# Patient Record
Sex: FEMALE | Race: WHITE | Employment: OTHER | URBAN - METROPOLITAN AREA
[De-identification: names, ages, dates, MRNs, and addresses within clinical notes are randomized per-mention and may not be internally consistent; named-entity substitution may affect disease eponyms.]

---

## 2017-01-17 PROBLEM — F51.01 PRIMARY INSOMNIA: Status: ACTIVE | Noted: 2017-01-17

## 2017-07-21 PROBLEM — I77.9 BILATERAL CAROTID ARTERY DISEASE (HCC): Status: ACTIVE | Noted: 2017-07-21

## 2017-12-11 PROBLEM — G25.0 ESSENTIAL TREMOR: Status: ACTIVE | Noted: 2017-12-11

## 2018-01-08 ENCOUNTER — HOSPITAL ENCOUNTER (OUTPATIENT)
Dept: CT IMAGING | Age: 78
Discharge: HOME | End: 2018-01-08
Attending: INTERNAL MEDICINE
Payer: MEDICARE

## 2018-01-08 VITALS — BODY MASS INDEX: 26.22 KG/M2 | HEIGHT: 63 IN | WEIGHT: 148 LBS

## 2018-01-08 DIAGNOSIS — I77.9 BILATERAL CAROTID ARTERY DISEASE (HCC): ICD-10-CM

## 2018-01-08 LAB — CREAT BLD-MCNC: 0.7 MG/DL (ref 0.8–1.5)

## 2018-01-08 PROCEDURE — 74011000258 HC RX REV CODE- 258: Performed by: INTERNAL MEDICINE

## 2018-01-08 PROCEDURE — 74011636320 HC RX REV CODE- 636/320: Performed by: INTERNAL MEDICINE

## 2018-01-08 PROCEDURE — 70498 CT ANGIOGRAPHY NECK: CPT

## 2018-01-08 PROCEDURE — 82565 ASSAY OF CREATININE: CPT

## 2018-01-08 RX ORDER — SODIUM CHLORIDE 0.9 % (FLUSH) 0.9 %
10 SYRINGE (ML) INJECTION
Status: COMPLETED | OUTPATIENT
Start: 2018-01-08 | End: 2018-01-08

## 2018-01-08 RX ADMIN — SODIUM CHLORIDE 100 ML: 900 INJECTION, SOLUTION INTRAVENOUS at 09:55

## 2018-01-08 RX ADMIN — Medication 10 ML: at 09:55

## 2018-01-08 RX ADMIN — IOPAMIDOL 80 ML: 755 INJECTION, SOLUTION INTRAVENOUS at 09:55

## 2018-01-08 NOTE — PROGRESS NOTES
I called and informed patient of CTA neck results and Dr. Linda Cabral response. Patient verbalized understanding and appreciation.

## 2018-01-08 NOTE — PROGRESS NOTES
Please let her know that the CTA neck showed 50% buildup, nothing critical.  Good news. No surgery needed. We will review more at follow up.   Thanks

## 2018-06-07 PROBLEM — G25.0 ESSENTIAL TREMOR: Chronic | Status: ACTIVE | Noted: 2017-12-11

## 2018-06-07 PROBLEM — E78.00 PURE HYPERCHOLESTEROLEMIA: Status: ACTIVE | Noted: 2018-06-07

## 2018-12-11 PROBLEM — K21.9 GASTROESOPHAGEAL REFLUX DISEASE WITHOUT ESOPHAGITIS: Status: ACTIVE | Noted: 2018-12-11

## 2018-12-11 PROBLEM — I48.21 PERMANENT ATRIAL FIBRILLATION (HCC): Status: ACTIVE | Noted: 2018-12-11

## 2019-06-18 PROBLEM — Z79.01 LONG TERM CURRENT USE OF ANTICOAGULANT: Status: ACTIVE | Noted: 2019-06-18

## 2019-06-18 PROBLEM — F41.1 GENERALIZED ANXIETY DISORDER: Status: ACTIVE | Noted: 2019-06-18

## 2019-06-18 PROBLEM — I65.23 BILATERAL CAROTID ARTERY STENOSIS: Status: ACTIVE | Noted: 2019-06-18

## 2019-06-18 PROBLEM — D50.9 IRON DEFICIENCY ANEMIA: Status: ACTIVE | Noted: 2019-06-18

## 2019-12-09 ENCOUNTER — HOSPITAL ENCOUNTER (OUTPATIENT)
Dept: GENERAL RADIOLOGY | Age: 79
Discharge: HOME | End: 2019-12-09
Payer: MEDICARE

## 2019-12-09 DIAGNOSIS — R05.9 COUGH: ICD-10-CM

## 2019-12-09 PROCEDURE — 71046 X-RAY EXAM CHEST 2 VIEWS: CPT

## 2019-12-30 ENCOUNTER — HOSPITAL ENCOUNTER (OUTPATIENT)
Dept: LAB | Age: 79
Discharge: HOME | End: 2019-12-30
Payer: MEDICARE

## 2019-12-30 DIAGNOSIS — D50.9 IRON DEFICIENCY ANEMIA, UNSPECIFIED IRON DEFICIENCY ANEMIA TYPE: ICD-10-CM

## 2019-12-30 DIAGNOSIS — R68.89 OTHER GENERAL SYMPTOMS AND SIGNS: ICD-10-CM

## 2019-12-30 LAB
ALBUMIN SERPL-MCNC: 3.6 G/DL (ref 3.2–4.6)
ALBUMIN/GLOB SERPL: 0.9 {RATIO} (ref 1.2–3.5)
ALP SERPL-CCNC: 119 U/L (ref 50–136)
ALT SERPL-CCNC: 40 U/L (ref 12–65)
ANION GAP SERPL CALC-SCNC: 6 MMOL/L (ref 7–16)
AST SERPL-CCNC: 21 U/L (ref 15–37)
BASOPHILS # BLD: 0 K/UL (ref 0–0.2)
BASOPHILS NFR BLD: 0 % (ref 0–2)
BILIRUB SERPL-MCNC: 0.5 MG/DL (ref 0.2–1.1)
BUN SERPL-MCNC: 12 MG/DL (ref 8–23)
CALCIUM SERPL-MCNC: 8.9 MG/DL (ref 8.3–10.4)
CHLORIDE SERPL-SCNC: 104 MMOL/L (ref 98–107)
CO2 SERPL-SCNC: 26 MMOL/L (ref 21–32)
CREAT SERPL-MCNC: 0.8 MG/DL (ref 0.6–1)
DIFFERENTIAL METHOD BLD: ABNORMAL
EOSINOPHIL # BLD: 0.2 K/UL (ref 0–0.8)
EOSINOPHIL NFR BLD: 3 % (ref 0.5–7.8)
ERYTHROCYTE [DISTWIDTH] IN BLOOD BY AUTOMATED COUNT: 17.4 % (ref 11.9–14.6)
FERRITIN SERPL-MCNC: 10 NG/ML (ref 8–388)
FOLATE SERPL-MCNC: 8.7 NG/ML (ref 3.1–17.5)
GLOBULIN SER CALC-MCNC: 3.8 G/DL (ref 2.3–3.5)
GLUCOSE SERPL-MCNC: 113 MG/DL (ref 65–100)
HCT VFR BLD AUTO: 34.8 % (ref 35.8–46.3)
HGB BLD-MCNC: 10.1 G/DL (ref 11.7–15.4)
HGB RETIC QN AUTO: 24 PG (ref 29–35)
IMM GRANULOCYTES # BLD AUTO: 0 K/UL (ref 0–0.5)
IMM GRANULOCYTES NFR BLD AUTO: 0 % (ref 0–5)
IMM RETICS NFR: 20.9 % (ref 3–15.9)
IRON SATN MFR SERPL: 5 %
IRON SERPL-MCNC: 24 UG/DL (ref 35–150)
LYMPHOCYTES # BLD: 2.1 K/UL (ref 0.5–4.6)
LYMPHOCYTES NFR BLD: 29 % (ref 13–44)
MCH RBC QN AUTO: 22.1 PG (ref 26.1–32.9)
MCHC RBC AUTO-ENTMCNC: 29 G/DL (ref 31.4–35)
MCV RBC AUTO: 76.1 FL (ref 79.6–97.8)
MONOCYTES # BLD: 0.6 K/UL (ref 0.1–1.3)
MONOCYTES NFR BLD: 9 % (ref 4–12)
NEUTS SEG # BLD: 4.3 K/UL (ref 1.7–8.2)
NEUTS SEG NFR BLD: 59 % (ref 43–78)
NRBC # BLD: 0 K/UL (ref 0–0.2)
PLATELET # BLD AUTO: 351 K/UL (ref 150–450)
PMV BLD AUTO: 9.7 FL (ref 9.4–12.3)
POTASSIUM SERPL-SCNC: 4 MMOL/L (ref 3.5–5.1)
PROT SERPL-MCNC: 7.4 G/DL (ref 6.3–8.2)
RBC # BLD AUTO: 4.57 M/UL (ref 4.05–5.25)
RETICS # AUTO: 0.06 M/UL (ref 0.03–0.1)
RETICS/RBC NFR AUTO: 1.3 % (ref 0.3–2)
SODIUM SERPL-SCNC: 136 MMOL/L (ref 136–145)
TIBC SERPL-MCNC: 488 UG/DL (ref 250–450)
VIT B12 SERPL-MCNC: 459 PG/ML (ref 193–986)
WBC # BLD AUTO: 7.3 K/UL (ref 4.3–11.1)

## 2019-12-30 PROCEDURE — 85046 RETICYTE/HGB CONCENTRATE: CPT

## 2019-12-30 PROCEDURE — 36415 COLL VENOUS BLD VENIPUNCTURE: CPT

## 2019-12-30 PROCEDURE — 80053 COMPREHEN METABOLIC PANEL: CPT

## 2019-12-30 PROCEDURE — 82746 ASSAY OF FOLIC ACID SERUM: CPT

## 2019-12-30 PROCEDURE — 83540 ASSAY OF IRON: CPT

## 2019-12-30 PROCEDURE — 82607 VITAMIN B-12: CPT

## 2019-12-30 PROCEDURE — 85025 COMPLETE CBC W/AUTO DIFF WBC: CPT

## 2019-12-30 PROCEDURE — 82728 ASSAY OF FERRITIN: CPT

## 2020-01-07 ENCOUNTER — HOSPITAL ENCOUNTER (OUTPATIENT)
Dept: INFUSION THERAPY | Age: 80
Discharge: HOME | End: 2020-01-07
Payer: MEDICARE

## 2020-01-07 VITALS
TEMPERATURE: 97.6 F | HEART RATE: 57 BPM | SYSTOLIC BLOOD PRESSURE: 144 MMHG | OXYGEN SATURATION: 96 % | DIASTOLIC BLOOD PRESSURE: 76 MMHG | RESPIRATION RATE: 18 BRPM

## 2020-01-07 DIAGNOSIS — D50.9 IRON DEFICIENCY ANEMIA, UNSPECIFIED IRON DEFICIENCY ANEMIA TYPE: Primary | ICD-10-CM

## 2020-01-07 PROCEDURE — 96361 HYDRATE IV INFUSION ADD-ON: CPT

## 2020-01-07 PROCEDURE — 96374 THER/PROPH/DIAG INJ IV PUSH: CPT

## 2020-01-07 PROCEDURE — 74011000258 HC RX REV CODE- 258: Performed by: INTERNAL MEDICINE

## 2020-01-07 PROCEDURE — 74011250636 HC RX REV CODE- 250/636: Performed by: INTERNAL MEDICINE

## 2020-01-07 RX ORDER — SODIUM CHLORIDE 0.9 % (FLUSH) 0.9 %
10 SYRINGE (ML) INJECTION AS NEEDED
Status: DISCONTINUED | OUTPATIENT
Start: 2020-01-07 | End: 2020-01-08 | Stop reason: HOSPADM

## 2020-01-07 RX ADMIN — Medication 10 ML: at 14:24

## 2020-01-07 RX ADMIN — FERUMOXYTOL 510 MG: 510 INJECTION INTRAVENOUS at 14:24

## 2020-01-07 RX ADMIN — SODIUM CHLORIDE 500 ML: 900 INJECTION, SOLUTION INTRAVENOUS at 14:39

## 2020-01-07 NOTE — PROGRESS NOTES
Arrived to the [facility]. Patience completed. Patient tolerated well. Any issues or concerns during appointment: none. Patient aware of next infusion appointment on 1/14 (date) at 2:30 PM (time). Discharged ambulatory.

## 2020-01-14 ENCOUNTER — HOSPITAL ENCOUNTER (OUTPATIENT)
Dept: INFUSION THERAPY | Age: 80
Discharge: HOME | End: 2020-01-14
Payer: MEDICARE

## 2020-01-14 VITALS
TEMPERATURE: 97.8 F | DIASTOLIC BLOOD PRESSURE: 70 MMHG | RESPIRATION RATE: 18 BRPM | HEART RATE: 65 BPM | OXYGEN SATURATION: 97 % | SYSTOLIC BLOOD PRESSURE: 152 MMHG

## 2020-01-14 DIAGNOSIS — D50.9 IRON DEFICIENCY ANEMIA, UNSPECIFIED IRON DEFICIENCY ANEMIA TYPE: Primary | ICD-10-CM

## 2020-01-14 PROCEDURE — 74011250636 HC RX REV CODE- 250/636: Performed by: INTERNAL MEDICINE

## 2020-01-14 PROCEDURE — 96374 THER/PROPH/DIAG INJ IV PUSH: CPT

## 2020-01-14 PROCEDURE — 74011000258 HC RX REV CODE- 258: Performed by: INTERNAL MEDICINE

## 2020-01-14 RX ORDER — SODIUM CHLORIDE 0.9 % (FLUSH) 0.9 %
10 SYRINGE (ML) INJECTION AS NEEDED
Status: DISCONTINUED | OUTPATIENT
Start: 2020-01-14 | End: 2020-01-15 | Stop reason: HOSPADM

## 2020-01-14 RX ADMIN — SODIUM CHLORIDE 500 ML: 900 INJECTION, SOLUTION INTRAVENOUS at 14:30

## 2020-01-14 RX ADMIN — FERUMOXYTOL 510 MG: 510 INJECTION INTRAVENOUS at 14:50

## 2020-01-14 RX ADMIN — Medication 10 ML: at 15:35

## 2020-01-14 RX ADMIN — Medication 10 ML: at 14:30

## 2020-04-30 ENCOUNTER — HOSPITAL ENCOUNTER (OUTPATIENT)
Dept: LAB | Age: 80
Discharge: HOME | End: 2020-04-30
Payer: MEDICARE

## 2020-04-30 DIAGNOSIS — D50.9 IRON DEFICIENCY ANEMIA, UNSPECIFIED IRON DEFICIENCY ANEMIA TYPE: ICD-10-CM

## 2020-04-30 LAB
ALBUMIN SERPL-MCNC: 3.6 G/DL (ref 3.2–4.6)
ALBUMIN/GLOB SERPL: 1 {RATIO} (ref 1.2–3.5)
ALP SERPL-CCNC: 95 U/L (ref 50–136)
ALT SERPL-CCNC: 24 U/L (ref 12–65)
ANION GAP SERPL CALC-SCNC: 5 MMOL/L (ref 7–16)
AST SERPL-CCNC: 14 U/L (ref 15–37)
BASOPHILS # BLD: 0 K/UL (ref 0–0.2)
BASOPHILS NFR BLD: 0 % (ref 0–2)
BILIRUB SERPL-MCNC: 0.4 MG/DL (ref 0.2–1.1)
BUN SERPL-MCNC: 11 MG/DL (ref 8–23)
CALCIUM SERPL-MCNC: 8.9 MG/DL (ref 8.3–10.4)
CHLORIDE SERPL-SCNC: 103 MMOL/L (ref 98–107)
CO2 SERPL-SCNC: 27 MMOL/L (ref 21–32)
CREAT SERPL-MCNC: 0.68 MG/DL (ref 0.6–1)
DIFFERENTIAL METHOD BLD: ABNORMAL
EOSINOPHIL # BLD: 0.2 K/UL (ref 0–0.8)
EOSINOPHIL NFR BLD: 2 % (ref 0.5–7.8)
ERYTHROCYTE [DISTWIDTH] IN BLOOD BY AUTOMATED COUNT: 15.2 % (ref 11.9–14.6)
FERRITIN SERPL-MCNC: 139 NG/ML (ref 8–388)
GLOBULIN SER CALC-MCNC: 3.7 G/DL (ref 2.3–3.5)
GLUCOSE SERPL-MCNC: 104 MG/DL (ref 65–100)
HCT VFR BLD AUTO: 42 % (ref 35.8–46.3)
HGB BLD-MCNC: 13.7 G/DL (ref 11.7–15.4)
IMM GRANULOCYTES # BLD AUTO: 0 K/UL (ref 0–0.5)
IMM GRANULOCYTES NFR BLD AUTO: 0 % (ref 0–5)
IRON SATN MFR SERPL: 35 %
IRON SERPL-MCNC: 106 UG/DL (ref 35–150)
LYMPHOCYTES # BLD: 2.5 K/UL (ref 0.5–4.6)
LYMPHOCYTES NFR BLD: 31 % (ref 13–44)
MCH RBC QN AUTO: 31.1 PG (ref 26.1–32.9)
MCHC RBC AUTO-ENTMCNC: 32.6 G/DL (ref 31.4–35)
MCV RBC AUTO: 95.5 FL (ref 79.6–97.8)
MONOCYTES # BLD: 0.5 K/UL (ref 0.1–1.3)
MONOCYTES NFR BLD: 7 % (ref 4–12)
NEUTS SEG # BLD: 4.9 K/UL (ref 1.7–8.2)
NEUTS SEG NFR BLD: 60 % (ref 43–78)
NRBC # BLD: 0 K/UL (ref 0–0.2)
PLATELET # BLD AUTO: 193 K/UL (ref 150–450)
PMV BLD AUTO: 10.1 FL (ref 9.4–12.3)
POTASSIUM SERPL-SCNC: 4.2 MMOL/L (ref 3.5–5.1)
PROT SERPL-MCNC: 7.3 G/DL (ref 6.3–8.2)
RBC # BLD AUTO: 4.4 M/UL (ref 4.05–5.25)
SODIUM SERPL-SCNC: 135 MMOL/L (ref 136–145)
TIBC SERPL-MCNC: 304 UG/DL (ref 250–450)
WBC # BLD AUTO: 8 K/UL (ref 4.3–11.1)

## 2020-04-30 PROCEDURE — 85025 COMPLETE CBC W/AUTO DIFF WBC: CPT

## 2020-04-30 PROCEDURE — 83540 ASSAY OF IRON: CPT

## 2020-04-30 PROCEDURE — 36415 COLL VENOUS BLD VENIPUNCTURE: CPT

## 2020-04-30 PROCEDURE — 80053 COMPREHEN METABOLIC PANEL: CPT

## 2020-04-30 PROCEDURE — 82728 ASSAY OF FERRITIN: CPT

## 2020-09-21 ENCOUNTER — HOSPITAL ENCOUNTER (OUTPATIENT)
Dept: LAB | Age: 80
Discharge: HOME | End: 2020-09-21
Payer: MEDICARE

## 2020-09-21 DIAGNOSIS — I27.20 PULMONARY HYPERTENSION (HCC): ICD-10-CM

## 2020-09-21 LAB
ANION GAP SERPL CALC-SCNC: 3 MMOL/L (ref 7–16)
BUN SERPL-MCNC: 8 MG/DL (ref 8–23)
CALCIUM SERPL-MCNC: 8.9 MG/DL (ref 8.3–10.4)
CHLORIDE SERPL-SCNC: 103 MMOL/L (ref 98–107)
CO2 SERPL-SCNC: 27 MMOL/L (ref 21–32)
CREAT SERPL-MCNC: 0.75 MG/DL (ref 0.6–1)
GLUCOSE SERPL-MCNC: 84 MG/DL (ref 65–100)
POTASSIUM SERPL-SCNC: 4.5 MMOL/L (ref 3.5–5.1)
SODIUM SERPL-SCNC: 133 MMOL/L (ref 136–145)

## 2020-09-21 PROCEDURE — 80048 BASIC METABOLIC PNL TOTAL CA: CPT

## 2020-09-21 PROCEDURE — 36415 COLL VENOUS BLD VENIPUNCTURE: CPT

## 2020-09-21 NOTE — PROGRESS NOTES
Please call her, K is better now, good news. Remain on aldactone and other meds. No changes. Call me for issues, see me as planned.   Thanks

## 2022-03-18 PROBLEM — F51.01 PRIMARY INSOMNIA: Status: ACTIVE | Noted: 2017-01-17

## 2022-03-18 PROBLEM — D50.9 IRON DEFICIENCY ANEMIA: Status: ACTIVE | Noted: 2019-06-18

## 2022-03-18 PROBLEM — Z79.01 LONG TERM CURRENT USE OF ANTICOAGULANT: Status: ACTIVE | Noted: 2019-06-18

## 2022-03-19 PROBLEM — I77.9 BILATERAL CAROTID ARTERY DISEASE (HCC): Status: ACTIVE | Noted: 2017-07-21

## 2022-03-19 PROBLEM — E78.00 PURE HYPERCHOLESTEROLEMIA: Status: ACTIVE | Noted: 2018-06-07

## 2022-03-19 PROBLEM — I48.21 PERMANENT ATRIAL FIBRILLATION (HCC): Status: ACTIVE | Noted: 2018-12-11

## 2022-03-19 PROBLEM — F41.1 GENERALIZED ANXIETY DISORDER: Status: ACTIVE | Noted: 2019-06-18

## 2022-03-19 PROBLEM — K21.9 GASTROESOPHAGEAL REFLUX DISEASE WITHOUT ESOPHAGITIS: Status: ACTIVE | Noted: 2018-12-11

## 2022-03-20 PROBLEM — I65.23 BILATERAL CAROTID ARTERY STENOSIS: Status: ACTIVE | Noted: 2019-06-18

## 2022-03-20 PROBLEM — G25.0 ESSENTIAL TREMOR: Status: ACTIVE | Noted: 2017-12-11

## 2022-06-06 DIAGNOSIS — I48.0 PAROXYSMAL ATRIAL FIBRILLATION (HCC): Primary | ICD-10-CM

## 2022-06-06 DIAGNOSIS — I10 ESSENTIAL HYPERTENSION: ICD-10-CM

## 2022-06-06 LAB
ALBUMIN SERPL-MCNC: 4 G/DL (ref 3.2–4.6)
ALBUMIN/GLOB SERPL: 1.3 {RATIO} (ref 1.2–3.5)
ALP SERPL-CCNC: 103 U/L (ref 50–136)
ALT SERPL-CCNC: 19 U/L (ref 12–65)
ANION GAP SERPL CALC-SCNC: 6 MMOL/L (ref 7–16)
AST SERPL-CCNC: 14 U/L (ref 15–37)
BASOPHILS # BLD: 0 K/UL (ref 0–0.2)
BASOPHILS NFR BLD: 0 % (ref 0–2)
BILIRUB SERPL-MCNC: 0.6 MG/DL (ref 0.2–1.1)
BUN SERPL-MCNC: 13 MG/DL (ref 8–23)
CALCIUM SERPL-MCNC: 9.8 MG/DL (ref 8.3–10.4)
CHLORIDE SERPL-SCNC: 103 MMOL/L (ref 98–107)
CO2 SERPL-SCNC: 26 MMOL/L (ref 21–32)
CREAT SERPL-MCNC: 0.8 MG/DL (ref 0.6–1)
DIFFERENTIAL METHOD BLD: ABNORMAL
EOSINOPHIL # BLD: 0.2 K/UL (ref 0–0.8)
EOSINOPHIL NFR BLD: 3 % (ref 0.5–7.8)
ERYTHROCYTE [DISTWIDTH] IN BLOOD BY AUTOMATED COUNT: 13.4 % (ref 11.9–14.6)
GLOBULIN SER CALC-MCNC: 3.2 G/DL (ref 2.3–3.5)
GLUCOSE SERPL-MCNC: 92 MG/DL (ref 65–100)
HCT VFR BLD AUTO: 40.6 % (ref 35.8–46.3)
HGB BLD-MCNC: 13 G/DL (ref 11.7–15.4)
IMM GRANULOCYTES # BLD AUTO: 0 K/UL (ref 0–0.5)
IMM GRANULOCYTES NFR BLD AUTO: 0 % (ref 0–5)
LYMPHOCYTES # BLD: 3.1 K/UL (ref 0.5–4.6)
LYMPHOCYTES NFR BLD: 45 % (ref 13–44)
MCH RBC QN AUTO: 30.4 PG (ref 26.1–32.9)
MCHC RBC AUTO-ENTMCNC: 32 G/DL (ref 31.4–35)
MCV RBC AUTO: 95.1 FL (ref 79.6–97.8)
MONOCYTES # BLD: 0.5 K/UL (ref 0.1–1.3)
MONOCYTES NFR BLD: 8 % (ref 4–12)
NEUTS SEG # BLD: 3 K/UL (ref 1.7–8.2)
NEUTS SEG NFR BLD: 44 % (ref 43–78)
NRBC # BLD: 0 K/UL (ref 0–0.2)
PLATELET # BLD AUTO: 227 K/UL (ref 150–450)
PMV BLD AUTO: 10.4 FL (ref 9.4–12.3)
POTASSIUM SERPL-SCNC: 4.7 MMOL/L (ref 3.5–5.1)
PROT SERPL-MCNC: 7.2 G/DL (ref 6.3–8.2)
RBC # BLD AUTO: 4.27 M/UL (ref 4.05–5.2)
SODIUM SERPL-SCNC: 135 MMOL/L (ref 136–145)
WBC # BLD AUTO: 6.9 K/UL (ref 4.3–11.1)

## 2022-06-09 ENCOUNTER — OFFICE VISIT (OUTPATIENT)
Dept: FAMILY MEDICINE CLINIC | Facility: CLINIC | Age: 82
End: 2022-06-09
Payer: MEDICARE

## 2022-06-09 VITALS
WEIGHT: 157.25 LBS | BODY MASS INDEX: 29.69 KG/M2 | DIASTOLIC BLOOD PRESSURE: 100 MMHG | OXYGEN SATURATION: 97 % | SYSTOLIC BLOOD PRESSURE: 146 MMHG | TEMPERATURE: 97.3 F | HEIGHT: 61 IN | HEART RATE: 59 BPM

## 2022-06-09 DIAGNOSIS — I10 WHITE COAT SYNDROME WITH DIAGNOSIS OF HYPERTENSION: ICD-10-CM

## 2022-06-09 DIAGNOSIS — I48.0 PAROXYSMAL ATRIAL FIBRILLATION (HCC): ICD-10-CM

## 2022-06-09 DIAGNOSIS — Z86.73 HISTORY OF CVA (CEREBROVASCULAR ACCIDENT): ICD-10-CM

## 2022-06-09 DIAGNOSIS — E78.2 MIXED HYPERLIPIDEMIA: ICD-10-CM

## 2022-06-09 DIAGNOSIS — F51.04 CHRONIC INSOMNIA: Primary | ICD-10-CM

## 2022-06-09 DIAGNOSIS — I10 ESSENTIAL HYPERTENSION: ICD-10-CM

## 2022-06-09 DIAGNOSIS — F41.1 GENERALIZED ANXIETY DISORDER: ICD-10-CM

## 2022-06-09 PROCEDURE — 1123F ACP DISCUSS/DSCN MKR DOCD: CPT | Performed by: PHYSICIAN ASSISTANT

## 2022-06-09 PROCEDURE — G8427 DOCREV CUR MEDS BY ELIG CLIN: HCPCS | Performed by: PHYSICIAN ASSISTANT

## 2022-06-09 PROCEDURE — G8400 PT W/DXA NO RESULTS DOC: HCPCS | Performed by: PHYSICIAN ASSISTANT

## 2022-06-09 PROCEDURE — 1090F PRES/ABSN URINE INCON ASSESS: CPT | Performed by: PHYSICIAN ASSISTANT

## 2022-06-09 PROCEDURE — G8417 CALC BMI ABV UP PARAM F/U: HCPCS | Performed by: PHYSICIAN ASSISTANT

## 2022-06-09 PROCEDURE — 99214 OFFICE O/P EST MOD 30 MIN: CPT | Performed by: PHYSICIAN ASSISTANT

## 2022-06-09 PROCEDURE — 1036F TOBACCO NON-USER: CPT | Performed by: PHYSICIAN ASSISTANT

## 2022-06-09 RX ORDER — LORAZEPAM 2 MG/1
2 TABLET ORAL NIGHTLY PRN
Qty: 30 TABLET | Refills: 5 | Status: SHIPPED | OUTPATIENT
Start: 2022-06-09 | End: 2022-07-09

## 2022-06-09 NOTE — PROGRESS NOTES
Chief Complaint   Patient presents with    Follow-up     6 month, discuss test results, feeling fine    Medication Refill       HISTORY OF PRESENT ILLNESS:  Yves Sarkar is a very pleasant [de-identified] y.o. female seen for a follow up visit for several chronic medical problems, includin. Hypertension- uncontrolled. BP today in the office was 146/100. She does monitor BP at home and BP normally around 130's/60's. She denies CP, ACUNA/SOB, palpitations, lower extremity edema, dizziness, syncopal episodes, tobacco use, regular ETOH use. She does have a history of CVA. She also has a. Fib and sees cardiology. Compliant with aspirin and statin. 2. Hyperlipidemia- lipids to goal  Lab Results   Component Value Date    CHOL 165 2021    CHOL 149 2021    CHOL 158 2020     Lab Results   Component Value Date    TRIG 133 2021    TRIG 94 2021    TRIG 132 2020     Lab Results   Component Value Date    HDL 51 2021    HDL 53 2021    HDL 54 2020     Lab Results   Component Value Date    LDLCALC 91 2021    LDLCALC 79 2021    LDLCALC 81 2020     Lab Results   Component Value Date    LABVLDL 23 2020    LABVLDL 16 2019    LABVLDL 18 2019    VLDL 23 2021    VLDL 17 2021    VLDL 23 2020       3. Anxiety- stable with citalopram. She she denies depressed mood, anhedonia, fatigue change in appetite/weight, change in sleep habits, difficulty concentrating, suicidal thoughts, or panic attacks. She takes lorazepam nightly for insomnia. She denies AM drowsiness, falls. PAST MEDICAL HISTORY:  Current Outpatient Medications   Medication Sig Dispense Refill    LORazepam (ATIVAN) 2 MG tablet Take 1 tablet by mouth nightly as needed (insomnia) for up to 30 days.  TAKE 1 TABLET BY MOUTH AT NIGHT (MAX DAILY AMOUNT: 2 MG.) 30 tablet 5    amLODIPine (NORVASC) 5 MG tablet Take 5 mg by mouth daily      ascorbic acid (VITAMIN C) 250 MG tablet Take by mouth      aspirin 81 MG EC tablet Take by mouth daily      atorvastatin (LIPITOR) 40 MG tablet Take 40 mg by mouth daily      citalopram (CELEXA) 40 MG tablet Take 40 mg by mouth daily      colestipol (COLESTID) 5 g GRAN granules Take 5 g by mouth 2 times daily      lisinopril (PRINIVIL;ZESTRIL) 20 MG tablet Take 20 mg by mouth 2 times daily      omeprazole (PRILOSEC) 20 MG delayed release capsule Take 20 mg by mouth daily      propranolol (INDERAL) 40 MG tablet Take 40 mg by mouth 2 times daily      rivaroxaban (XARELTO) 20 MG TABS tablet Take 20 mg by mouth Daily with supper      spironolactone (ALDACTONE) 25 MG tablet Take 25 mg by mouth daily       No current facility-administered medications for this visit. No Known Allergies  Patient Active Problem List   Diagnosis    Paroxysmal atrial fibrillation (HCC)    Primary insomnia    Iron deficiency anemia    Long term current use of anticoagulant    Generalized anxiety disorder    Gastroesophageal reflux disease without esophagitis    Pulmonary hypertension (HCC)    SSS (sick sinus syndrome) (HCC)    Permanent atrial fibrillation (HCC)    Mitral valve regurgitation    History of CVA (cerebrovascular accident)    Bilateral carotid artery disease ([address]    Pure hypercholesterolemia    Bilateral carotid artery stenosis    Essential tremor     Social History     Tobacco Use    Smoking status: Never Smoker    Smokeless tobacco: Never Used   Substance Use Topics    Alcohol use: No     Alcohol/week: 0.0 standard drinks     Family History   Problem Relation Age of Onset    Stroke Mother     Heart Disease Father      Past Surgical History:   Procedure Laterality Date    COLONOSCOPY  2011    GYN      sharonda--years ago    ORTHOPEDIC SURGERY  2013    left total knee       Review of Systems   Constitutional: Negative for activity change, appetite change, chills, fatigue and unexpected weight change.    Respiratory: Negative for cough and shortness of breath. Cardiovascular: Negative for chest pain, palpitations and leg swelling. Gastrointestinal: Positive for diarrhea. Negative for abdominal pain, anal bleeding, blood in stool, constipation, nausea and vomiting. Genitourinary: Negative. Musculoskeletal: Negative for gait problem. Skin: Negative. Neurological: Negative for dizziness. Psychiatric/Behavioral: Positive for sleep disturbance. Negative for agitation and dysphoric mood. The patient is nervous/anxious. VITAL SIGNS:  BP (!) 146/100 (Site: Left Upper Arm, Position: Sitting)   Pulse 59   Temp 97.3 °F (36.3 °C) (Temporal)   Ht 5' 1\" (1.549 m)   Wt 157 lb 4 oz (71.3 kg)   SpO2 97%   BMI 29.71 kg/m²      Physical Exam  Vitals reviewed. Constitutional:       Appearance: Normal appearance. She is normal weight. HENT:      Head: Normocephalic and atraumatic. Right Ear: External ear normal.      Left Ear: External ear normal.   Eyes:      Conjunctiva/sclera: Conjunctivae normal.   Cardiovascular:      Rate and Rhythm: Normal rate and regular rhythm. Heart sounds: Normal heart sounds. No murmur heard. Pulmonary:      Effort: Pulmonary effort is normal.      Breath sounds: Normal breath sounds. Skin:     General: Skin is warm and dry. Neurological:      Mental Status: She is alert and oriented to person, place, and time. Psychiatric:         Mood and Affect: Mood normal.         Behavior: Behavior normal.          ASSESSMENT/PLAN:  Elizabeth Lopez was seen today for follow-up and medication refill. Diagnoses and all orders for this visit:    Chronic insomnia  -     LORazepam (ATIVAN) 2 MG tablet; Take 1 tablet by mouth nightly as needed (insomnia) for up to 30 days.  TAKE 1 TABLET BY MOUTH AT NIGHT (MAX DAILY AMOUNT: 2 MG.)    Paroxysmal atrial fibrillation (HCC)    Essential hypertension    Generalized anxiety disorder    History of CVA (cerebrovascular accident)    Mixed hyperlipidemia    White coat syndrome with diagnosis of hypertension     I discussed all lab results. Discussed risks associated with long term benzodiazepine use, including physical and psychological dependence. Avoid taking lorazepam with opioid pain medications and alcohol as this could lead to respiratory depression and death. I recommended that she try taking 1mg nightly for insomnia. Follow up in 6 months.        PDMP Monitoring:    Last PDMP Jonathan Troncoso as Reviewed [facility]):  Review User Review Instant Review Result   LEIDY LAZO 6/9/2022  9:24 AM Reviewed PDMP [1]     Zoey Moyer PA-C

## 2022-06-10 ASSESSMENT — ENCOUNTER SYMPTOMS
CONSTIPATION: 0
SHORTNESS OF BREATH: 0
NAUSEA: 0
ANAL BLEEDING: 0
COUGH: 0
BLOOD IN STOOL: 0
ABDOMINAL PAIN: 0
VOMITING: 0
DIARRHEA: 1

## 2022-06-22 ENCOUNTER — TELEPHONE (OUTPATIENT)
Dept: CARDIOLOGY CLINIC | Age: 82
End: 2022-06-22

## 2022-06-22 NOTE — TELEPHONE ENCOUNTER
Would like to  Xarelto samples at [facility] or [facility] office.

## 2022-08-17 ENCOUNTER — TELEPHONE (OUTPATIENT)
Dept: CARDIOLOGY CLINIC | Age: 82
End: 2022-08-17

## 2022-08-17 NOTE — TELEPHONE ENCOUNTER
Patient would like samples of Xarelto. Patient states she would like to pick samples up at the [place] [id number] office. Please call [phone] when ready.

## 2022-09-29 ENCOUNTER — OFFICE VISIT (OUTPATIENT)
Dept: CARDIOLOGY CLINIC | Age: 82
End: 2022-09-29
Payer: MEDICARE

## 2022-09-29 VITALS
DIASTOLIC BLOOD PRESSURE: 72 MMHG | HEIGHT: 61 IN | WEIGHT: 160 LBS | BODY MASS INDEX: 30.21 KG/M2 | SYSTOLIC BLOOD PRESSURE: 128 MMHG | HEART RATE: 48 BPM

## 2022-09-29 DIAGNOSIS — I49.5 SSS (SICK SINUS SYNDROME) (HCC): ICD-10-CM

## 2022-09-29 DIAGNOSIS — I48.0 PAROXYSMAL ATRIAL FIBRILLATION (HCC): Primary | ICD-10-CM

## 2022-09-29 DIAGNOSIS — I65.23 BILATERAL CAROTID ARTERY STENOSIS: ICD-10-CM

## 2022-09-29 DIAGNOSIS — I34.0 NONRHEUMATIC MITRAL VALVE REGURGITATION: ICD-10-CM

## 2022-09-29 DIAGNOSIS — I27.20 PULMONARY HYPERTENSION (HCC): ICD-10-CM

## 2022-09-29 PROCEDURE — G8400 PT W/DXA NO RESULTS DOC: HCPCS | Performed by: INTERNAL MEDICINE

## 2022-09-29 PROCEDURE — G8427 DOCREV CUR MEDS BY ELIG CLIN: HCPCS | Performed by: INTERNAL MEDICINE

## 2022-09-29 PROCEDURE — G8417 CALC BMI ABV UP PARAM F/U: HCPCS | Performed by: INTERNAL MEDICINE

## 2022-09-29 PROCEDURE — 1123F ACP DISCUSS/DSCN MKR DOCD: CPT | Performed by: INTERNAL MEDICINE

## 2022-09-29 PROCEDURE — 99214 OFFICE O/P EST MOD 30 MIN: CPT | Performed by: INTERNAL MEDICINE

## 2022-09-29 PROCEDURE — 1090F PRES/ABSN URINE INCON ASSESS: CPT | Performed by: INTERNAL MEDICINE

## 2022-09-29 PROCEDURE — 1036F TOBACCO NON-USER: CPT | Performed by: INTERNAL MEDICINE

## 2022-09-29 NOTE — PROGRESS NOTES
[address], [address] [employer] [facility], [address] [place] [address]  PHONE: [phone]     22    NAME:  Hudson Ortiz  : 1940  MRN: [id number]       SUBJECTIVE:   Hudson Ortiz is a [de-identified] y.o. female seen for a follow up visit regarding the following:     Chief Complaint   Patient presents with    Atrial Fibrillation       HPI: Here for Afib (dx 7/15 when admitted with CVA), acute CVA with hemorrhagic transformation after TPA 7/15 at [facility]   Carotid US 3/2022: mild    Echo 3/2022: normal EF, mod TR and PHTN, mod MR      No new angina, no CP. No real ACUNA. Walking more now. No new edema, illness. More active in home, no angina. Patient denies recent history of orthopnea, PND, excessive dizziness and/or syncope. Doing well on anticoagulation treatment as reviewed today, no bleeding issues or excessive bruising noted. Did not like taste on eliquis in past   pauses on BB with CVA,         Spouse with Dr. Malu Lanier. Past Medical History, Past Surgical History, Family history, Social History, and Medications were all reviewed with the patient today and updated as necessary.      Current Outpatient Medications   Medication Sig Dispense Refill    amLODIPine (NORVASC) 5 MG tablet Take 5 mg by mouth daily      ascorbic acid (VITAMIN C) 250 MG tablet Take by mouth      aspirin 81 MG EC tablet Take by mouth daily      atorvastatin (LIPITOR) 40 MG tablet Take 40 mg by mouth daily      citalopram (CELEXA) 40 MG tablet Take 40 mg by mouth daily      colestipol (COLESTID) 5 g GRAN granules Take 5 g by mouth 2 times daily      lisinopril (PRINIVIL;ZESTRIL) 20 MG tablet Take 20 mg by mouth 2 times daily      omeprazole (PRILOSEC) 20 MG delayed release capsule Take 20 mg by mouth daily      propranolol (INDERAL) 40 MG tablet Take 40 mg by mouth 2 times daily      rivaroxaban (XARELTO) 20 MG TABS tablet Take 20 mg by mouth Daily with supper      spironolactone (ALDACTONE) 25 MG tablet Take 25 mg by mouth daily No current facility-administered medications for this visit. No Known Allergies  Patient Active Problem List    Diagnosis Date Noted    Iron deficiency anemia 06/18/2019    Long term current use of anticoagulant 06/18/2019    Generalized anxiety disorder 06/18/2019    Bilateral carotid artery stenosis 06/18/2019    Gastroesophageal reflux disease without esophagitis 12/11/2018    Permanent atrial fibrillation ([address] 12/11/2018    Pure hypercholesterolemia 06/07/2018    Essential tremor 12/11/2017    History of CVA (cerebrovascular accident)      Acute CVA with hemorrhagic transformation after TPA 7/16 at [facility]        Bilateral carotid artery disease ([address] 07/21/2017    Primary insomnia 01/17/2017    Paroxysmal atrial fibrillation ([address]      Did not like the taste of low [place] eliquis  Dx 7/2015 when admitted for CVA        Pulmonary hypertension ([address]      Echo 7/15 [facility]: normal EF, mod MR, Mod TR, RSP 60        SSS (sick sinus syndrome) ([address]      Tachycardia-bradycardia  Kody pauses with BB after CVA 7/15        Mitral valve regurgitation       Past Surgical History:   Procedure Laterality Date    COLONOSCOPY  2011    GYN      sharonda--years ago    ORTHOPEDIC SURGERY  2013    left total knee     Family History   Problem Relation Age of Onset    Stroke Mother     Heart Disease Father      Social History     Tobacco Use    Smoking status: Never    Smokeless tobacco: Never   Substance Use Topics    Alcohol use: No     Alcohol/week: 0.0 standard drinks         ROS:    No obvious pertinent positives on review of systems except for what was outlined in the HPI today.       PHYSICAL EXAM:     /72   Pulse (!) 48   Ht 5' 1\" (1.549 m)   Wt 160 lb (72.6 kg)   BMI 30.23 kg/m²    General/Constitutional:   Alert and oriented x 3, no acute distress  HEENT:   normocephalic, atraumatic, moist mucous membranes  Neck:   No JVD or carotid bruits bilaterally  Cardiovascular:   regular rate and rhythm, no murmur/rub/gallop appreciated  Pulmonary:   clear to auscultation bilaterally, no respiratory distress  Abdomen:   soft, non-tender, non-distended  Ext:   No sig LE edema bilaterally  Skin:  warm and dry, no obvious rashes seen  Neuro:   no obvious sensory or motor deficits  Psychiatric:   normal mood and affect      Lab Results   Component Value Date/Time     06/06/2022 09:30 AM    K 4.7 06/06/2022 09:30 AM     06/06/2022 09:30 AM    CO2 26 06/06/2022 09:30 AM    BUN 13 06/06/2022 09:30 AM    CREATININE 0.80 06/06/2022 09:30 AM    GLUCOSE 92 06/06/2022 09:30 AM    CALCIUM 9.8 06/06/2022 09:30 AM        Lab Results   Component Value Date    WBC 6.9 06/06/2022    HGB 13.0 06/06/2022    HCT 40.6 06/06/2022    MCV 95.1 06/06/2022     06/06/2022       Lab Results   Component Value Date    TSH 1.440 12/02/2021       No results found for: LABA1C  No results found for: EAG    Lab Results   Component Value Date    CHOL 165 12/02/2021    CHOL 149 06/07/2021    CHOL 158 12/01/2020     Lab Results   Component Value Date    TRIG 133 12/02/2021    TRIG 94 06/07/2021    TRIG 132 12/01/2020     Lab Results   Component Value Date    HDL 51 12/02/2021    HDL 53 06/07/2021    HDL 54 12/01/2020     Lab Results   Component Value Date    LDLCALC 91 12/02/2021    LDLCALC 79 06/07/2021    LDLCALC 81 12/01/2020     Lab Results   Component Value Date    LABVLDL 23 06/12/2020    LABVLDL 16 12/11/2019    LABVLDL 18 06/11/2019    VLDL 23 12/02/2021    VLDL 17 06/07/2021    VLDL 23 12/01/2020     No results found for: CHOLHDLRATIO        I have Independently reviewed prior care notes, any ER records available, cardiac testing, labs and results with the patient and before seeing the patient today. Also independently reviewed outside records when available. ASSESSMENT:    Kash Hobbs was seen today for atrial fibrillation.     Diagnoses and all orders for this visit:    Paroxysmal atrial fibrillation ([address]    Pulmonary hypertension (HCC)    SSS (sick sinus syndrome) (HCC)    Nonrheumatic mitral valve regurgitation    Bilateral carotid artery stenosis        PLAN:         1. HTN:  Added aldactone 25, follow K and Cr. Better at home, white coat as reviewed. Edema better on 1/2 dose norvsac. 2. PAF/SSS:  Follow for SSS, no PPM needed now. On xarelto 20mg. Estimated Creatinine Clearance: 54.9 mL/min (by C-G formula based on SCr of 0.73 mg/dL). I have reviewed their anticoagulation treatment, instructed patient to call with any bleeding issues such as melana or other. The patient will call with any issues related to their treatment. All questions were answered with the patient voicing understanding. 3. MR/pHTN[de-identified]  Echo ok, no worsening ACUNA. Follow PHTN for now. The patient has been instructed to call with any angina or equivalent as reviewed today. All questions were answered with the patient voicing complete understanding. 4. Carotid Dz:   US ok.       5. HPL: continue lipitor. Patient has been instructed and agrees to call our office with any issues or other concerns related to their cardiac condition(s) and/or complaint(s). Return in about 6 months (around 3/29/2023).        LIBERTAD ORTEGA, DO  9/29/2022

## 2022-11-28 ENCOUNTER — TELEPHONE (OUTPATIENT)
Dept: CARDIOLOGY CLINIC | Age: 82
End: 2022-11-28

## 2022-11-28 DIAGNOSIS — I48.0 PAROXYSMAL ATRIAL FIBRILLATION (HCC): ICD-10-CM

## 2022-11-28 DIAGNOSIS — R00.1 BRADYCARDIA: Primary | ICD-10-CM

## 2022-11-28 NOTE — TELEPHONE ENCOUNTER
,  Where should I add her? You are booked through at least Dec.23rd. There is a spot 12/23 that is held for [facility]. Should I take that spot? Or can I do a lunch time spot?

## 2022-11-28 NOTE — TELEPHONE ENCOUNTER
Her HR has been in the 40-low 50's range past 4 days. When she saw Angela Edwards last Sept.he mentioned a pacemaker but now she is feeling bad and thinks she would like to discuss further. Last night HR was 39 before she went to bed. She just has no energy. When can she be worked in? Current Outpatient Medications on File Prior to Visit   Medication Sig Dispense Refill    amLODIPine (NORVASC) 5 MG tablet Take 5 mg by mouth daily      ascorbic acid (VITAMIN C) 250 MG tablet Take by mouth      aspirin 81 MG EC tablet Take by mouth daily      atorvastatin (LIPITOR) 40 MG tablet Take 40 mg by mouth daily      citalopram (CELEXA) 40 MG tablet Take 40 mg by mouth daily      colestipol (COLESTID) 5 g GRAN granules Take 5 g by mouth 2 times daily      lisinopril (PRINIVIL;ZESTRIL) 20 MG tablet Take 20 mg by mouth 2 times daily      omeprazole (PRILOSEC) 20 MG delayed release capsule Take 20 mg by mouth daily      propranolol (INDERAL) 40 MG tablet Take 40 mg by mouth 2 times daily      rivaroxaban (XARELTO) 20 MG TABS tablet Take 20 mg by mouth Daily with supper      spironolactone (ALDACTONE) 25 MG tablet Take 25 mg by mouth daily       No current facility-administered medications on file prior to visit.

## 2022-12-02 ENCOUNTER — NURSE ONLY (OUTPATIENT)
Dept: FAMILY MEDICINE CLINIC | Facility: CLINIC | Age: 82
End: 2022-12-02

## 2022-12-02 DIAGNOSIS — E78.2 MIXED HYPERLIPIDEMIA: ICD-10-CM

## 2022-12-02 DIAGNOSIS — I10 ESSENTIAL HYPERTENSION: ICD-10-CM

## 2022-12-02 DIAGNOSIS — I10 ESSENTIAL HYPERTENSION: Primary | ICD-10-CM

## 2022-12-02 LAB
ALBUMIN SERPL-MCNC: 4.3 G/DL (ref 3.2–4.6)
ALBUMIN/GLOB SERPL: 1.2 (ref 0.4–1.6)
ALP SERPL-CCNC: 110 U/L (ref 50–136)
ALT SERPL-CCNC: 16 U/L (ref 12–65)
ANION GAP SERPL CALC-SCNC: 7 MMOL/L (ref 2–11)
AST SERPL-CCNC: 10 U/L (ref 15–37)
BASOPHILS # BLD: 0 K/UL (ref 0–0.2)
BASOPHILS NFR BLD: 0 % (ref 0–2)
BILIRUB SERPL-MCNC: 0.5 MG/DL (ref 0.2–1.1)
BUN SERPL-MCNC: 14 MG/DL (ref 8–23)
CALCIUM SERPL-MCNC: 9.8 MG/DL (ref 8.3–10.4)
CHLORIDE SERPL-SCNC: 100 MMOL/L (ref 101–110)
CHOLEST SERPL-MCNC: 166 MG/DL
CO2 SERPL-SCNC: 23 MMOL/L (ref 21–32)
CREAT SERPL-MCNC: 0.9 MG/DL (ref 0.6–1)
DIFFERENTIAL METHOD BLD: NORMAL
EOSINOPHIL # BLD: 0.2 K/UL (ref 0–0.8)
EOSINOPHIL NFR BLD: 2 % (ref 0.5–7.8)
ERYTHROCYTE [DISTWIDTH] IN BLOOD BY AUTOMATED COUNT: 13.8 % (ref 11.9–14.6)
GLOBULIN SER CALC-MCNC: 3.5 G/DL (ref 2.8–4.5)
GLUCOSE SERPL-MCNC: 106 MG/DL (ref 65–100)
HCT VFR BLD AUTO: 41.6 % (ref 35.8–46.3)
HDLC SERPL-MCNC: 61 MG/DL (ref 40–60)
HDLC SERPL: 2.7
HGB BLD-MCNC: 13.4 G/DL (ref 11.7–15.4)
IMM GRANULOCYTES # BLD AUTO: 0 K/UL (ref 0–0.5)
IMM GRANULOCYTES NFR BLD AUTO: 0 % (ref 0–5)
LDLC SERPL CALC-MCNC: 80.4 MG/DL
LYMPHOCYTES # BLD: 3.6 K/UL (ref 0.5–4.6)
LYMPHOCYTES NFR BLD: 40 % (ref 13–44)
MCH RBC QN AUTO: 30.9 PG (ref 26.1–32.9)
MCHC RBC AUTO-ENTMCNC: 32.2 G/DL (ref 31.4–35)
MCV RBC AUTO: 95.9 FL (ref 82–102)
MONOCYTES # BLD: 0.6 K/UL (ref 0.1–1.3)
MONOCYTES NFR BLD: 7 % (ref 4–12)
NEUTS SEG # BLD: 4.5 K/UL (ref 1.7–8.2)
NEUTS SEG NFR BLD: 51 % (ref 43–78)
NRBC # BLD: 0 K/UL (ref 0–0.2)
PLATELET # BLD AUTO: 264 K/UL (ref 150–450)
PMV BLD AUTO: 10.5 FL (ref 9.4–12.3)
POTASSIUM SERPL-SCNC: 4.5 MMOL/L (ref 3.5–5.1)
PROT SERPL-MCNC: 7.8 G/DL (ref 6.3–8.2)
RBC # BLD AUTO: 4.34 M/UL (ref 4.05–5.2)
SODIUM SERPL-SCNC: 130 MMOL/L (ref 133–143)
TRIGL SERPL-MCNC: 123 MG/DL (ref 35–150)
TSH, 3RD GENERATION: 1.64 UIU/ML (ref 0.36–3.74)
VLDLC SERPL CALC-MCNC: 24.6 MG/DL (ref 6–23)
WBC # BLD AUTO: 9 K/UL (ref 4.3–11.1)

## 2022-12-09 ENCOUNTER — OFFICE VISIT (OUTPATIENT)
Dept: FAMILY MEDICINE CLINIC | Facility: CLINIC | Age: 82
End: 2022-12-09
Payer: MEDICARE

## 2022-12-09 VITALS
DIASTOLIC BLOOD PRESSURE: 78 MMHG | HEIGHT: 61 IN | BODY MASS INDEX: 29.83 KG/M2 | OXYGEN SATURATION: 98 % | HEART RATE: 94 BPM | SYSTOLIC BLOOD PRESSURE: 118 MMHG | WEIGHT: 158 LBS | TEMPERATURE: 97 F

## 2022-12-09 DIAGNOSIS — F32.A DEPRESSION, UNSPECIFIED DEPRESSION TYPE: ICD-10-CM

## 2022-12-09 DIAGNOSIS — F51.04 CHRONIC INSOMNIA: Primary | ICD-10-CM

## 2022-12-09 DIAGNOSIS — Z78.0 POST-MENOPAUSAL: ICD-10-CM

## 2022-12-09 DIAGNOSIS — F41.1 GENERALIZED ANXIETY DISORDER: ICD-10-CM

## 2022-12-09 PROCEDURE — G8427 DOCREV CUR MEDS BY ELIG CLIN: HCPCS | Performed by: STUDENT IN AN ORGANIZED HEALTH CARE EDUCATION/TRAINING PROGRAM

## 2022-12-09 PROCEDURE — G8484 FLU IMMUNIZE NO ADMIN: HCPCS | Performed by: STUDENT IN AN ORGANIZED HEALTH CARE EDUCATION/TRAINING PROGRAM

## 2022-12-09 PROCEDURE — 1090F PRES/ABSN URINE INCON ASSESS: CPT | Performed by: STUDENT IN AN ORGANIZED HEALTH CARE EDUCATION/TRAINING PROGRAM

## 2022-12-09 PROCEDURE — 1036F TOBACCO NON-USER: CPT | Performed by: STUDENT IN AN ORGANIZED HEALTH CARE EDUCATION/TRAINING PROGRAM

## 2022-12-09 PROCEDURE — G8417 CALC BMI ABV UP PARAM F/U: HCPCS | Performed by: STUDENT IN AN ORGANIZED HEALTH CARE EDUCATION/TRAINING PROGRAM

## 2022-12-09 PROCEDURE — 1123F ACP DISCUSS/DSCN MKR DOCD: CPT | Performed by: STUDENT IN AN ORGANIZED HEALTH CARE EDUCATION/TRAINING PROGRAM

## 2022-12-09 PROCEDURE — G8400 PT W/DXA NO RESULTS DOC: HCPCS | Performed by: STUDENT IN AN ORGANIZED HEALTH CARE EDUCATION/TRAINING PROGRAM

## 2022-12-09 PROCEDURE — 99214 OFFICE O/P EST MOD 30 MIN: CPT | Performed by: STUDENT IN AN ORGANIZED HEALTH CARE EDUCATION/TRAINING PROGRAM

## 2022-12-09 RX ORDER — CITALOPRAM 20 MG/1
20 TABLET ORAL DAILY
Qty: 30 TABLET | Refills: 5 | Status: SHIPPED | OUTPATIENT
Start: 2022-12-09

## 2022-12-09 RX ORDER — LORAZEPAM 1 MG/1
TABLET ORAL
Qty: 30 TABLET | Refills: 0 | Status: SHIPPED | OUTPATIENT
Start: 2022-12-09 | End: 2023-01-08

## 2022-12-09 RX ORDER — LORAZEPAM 2 MG/1
2 TABLET ORAL NIGHTLY
Qty: 30 TABLET | Refills: 5 | Status: CANCELLED | OUTPATIENT
Start: 2022-12-09 | End: 2023-01-08

## 2022-12-09 RX ORDER — OMEPRAZOLE 20 MG/1
20 CAPSULE, DELAYED RELEASE ORAL DAILY
Qty: 30 CAPSULE | Refills: 5 | Status: SHIPPED | OUTPATIENT
Start: 2022-12-09

## 2022-12-09 RX ORDER — TRAZODONE HYDROCHLORIDE 50 MG/1
50 TABLET ORAL NIGHTLY PRN
Qty: 30 TABLET | Refills: 5 | Status: SHIPPED | OUTPATIENT
Start: 2022-12-09

## 2022-12-09 RX ORDER — LORAZEPAM 2 MG/1
2 TABLET ORAL NIGHTLY
COMMUNITY
End: 2022-12-09 | Stop reason: SDUPTHER

## 2022-12-09 NOTE — PATIENT INSTRUCTIONS
Will order DEXA bone scan  Taper off Ativan over the next month, directions will be on prescription  Start trazodone nightly in 1 week to help with sleep  Decrease Celexa dose

## 2022-12-09 NOTE — PROGRESS NOTES
[facility]  [address]  Phone [phone]  Fax:  [phone]    Kunal Hussein (:  1940) is a 81 y.o. female here for evaluation of the following chief complaint(s): Anxiety, Gastroesophageal Reflux, and Insomnia (Review labs refills)       ASSESSMENT/PLAN:  1. Chronic insomnia  -     LORazepam (ATIVAN) 1 MG tablet; Take 1.5 tablets by mouth nightly for 10 days, THEN 1 tablet nightly for 10 days, THEN 0.5 tablets nightly for 10 days. , Disp-30 tablet, R-0Normal  2. Post-menopausal  -     DEXA Bone Density Axial Skeleton; Future  3. Generalized anxiety disorder  4. Depression, unspecified depression type    Takes Ativan 2 mg nightly for insomnia. Still having issues falling/staying asleep. Will have patient taper dose of Ativan and start trazodone. Currently taking Celexa 40 mg daily for anxiety/depression, will have patient decrease this to 20 mg. Colonoscopy 2022 without polyps, screening no longer indicated. Check screening DEXA scan. Continue daily vitamin D. Return in about 3 months (around 3/9/2023) for recheck. Subjective   SUBJECTIVE/OBJECTIVE:  HPI  81-year-old female with PMH of paroxysmal A. fib, SSS, HTN, carotid artery stenosis, CVA, HLD, VIRY/depression, and insomnia who presents for regular 6-month follow-up.  -Followed by cardiology  -Recently stopped Propranolol due to low HR, had been on it for tremors  -Trouble falling/staying asleep, naps during the afternoon, takes Ativan 2mg nightly, tried Ambien and Elavil in the past  -States her Celexa was increased to 40mg for depression after her stroke    Review of Systems       Objective     Vitals:    22 0855   BP: 118/78   Pulse: 94   Temp: 97 °F (36.1 °C)   SpO2: 98%       Physical Exam  Vitals reviewed. Constitutional:       General: She is not in acute distress. HENT:      Head: Normocephalic and atraumatic. Cardiovascular:      Rate and Rhythm: Normal rate.  Rhythm irregular. Pulmonary:      Effort: Pulmonary effort is normal.      Breath sounds: Normal breath sounds. Musculoskeletal:      Right lower leg: No edema. Left lower leg: No edema. Skin:     General: Skin is warm and dry. Neurological:      General: No focal deficit present. Mental Status: She is alert and oriented to person, place, and time. An electronic signature was used to authenticate this note.     --Danish Dueñas MD

## 2022-12-13 ENCOUNTER — TELEPHONE (OUTPATIENT)
Dept: CARDIOLOGY CLINIC | Age: 82
End: 2022-12-13

## 2022-12-13 NOTE — TELEPHONE ENCOUNTER
Lashonda Colunga, see me as planned tomorrow, see if we can get an urgent EP consult for SSS as well.   Thanks

## 2022-12-13 NOTE — TELEPHONE ENCOUNTER
Wore Zio 11-28-22 through 12-5 100% Afib burden at certain points slow rates down to 37bpm for 60 seconds on strip number 1. There was a 3 second pause and one run of VTACH. Report downloaded to 's in basket.

## 2022-12-14 ENCOUNTER — OFFICE VISIT (OUTPATIENT)
Dept: CARDIOLOGY CLINIC | Age: 82
End: 2022-12-14
Payer: MEDICARE

## 2022-12-14 VITALS
HEART RATE: 64 BPM | HEIGHT: 61 IN | WEIGHT: 156 LBS | DIASTOLIC BLOOD PRESSURE: 76 MMHG | BODY MASS INDEX: 29.45 KG/M2 | SYSTOLIC BLOOD PRESSURE: 120 MMHG

## 2022-12-14 DIAGNOSIS — I48.0 PAROXYSMAL ATRIAL FIBRILLATION (HCC): ICD-10-CM

## 2022-12-14 DIAGNOSIS — I49.5 SSS (SICK SINUS SYNDROME) (HCC): Primary | ICD-10-CM

## 2022-12-14 DIAGNOSIS — I34.0 NONRHEUMATIC MITRAL VALVE REGURGITATION: ICD-10-CM

## 2022-12-14 PROCEDURE — 99214 OFFICE O/P EST MOD 30 MIN: CPT | Performed by: INTERNAL MEDICINE

## 2022-12-14 PROCEDURE — G8428 CUR MEDS NOT DOCUMENT: HCPCS | Performed by: INTERNAL MEDICINE

## 2022-12-14 PROCEDURE — 1036F TOBACCO NON-USER: CPT | Performed by: INTERNAL MEDICINE

## 2022-12-14 PROCEDURE — G8400 PT W/DXA NO RESULTS DOC: HCPCS | Performed by: INTERNAL MEDICINE

## 2022-12-14 PROCEDURE — 1090F PRES/ABSN URINE INCON ASSESS: CPT | Performed by: INTERNAL MEDICINE

## 2022-12-14 PROCEDURE — 1123F ACP DISCUSS/DSCN MKR DOCD: CPT | Performed by: INTERNAL MEDICINE

## 2022-12-14 PROCEDURE — G8417 CALC BMI ABV UP PARAM F/U: HCPCS | Performed by: INTERNAL MEDICINE

## 2022-12-14 PROCEDURE — G8484 FLU IMMUNIZE NO ADMIN: HCPCS | Performed by: INTERNAL MEDICINE

## 2022-12-14 NOTE — PROGRESS NOTES
[address], [address] [employer] [address]  PHONE: [phone]     22    NAME:  Ashley Davison  : 1940  MRN: [id number]       SUBJECTIVE:   Ashley Davison is a 81 y.o. female seen for a follow up visit regarding the following:     Chief Complaint   Patient presents with    Follow-up Chronic Condition    Results     Monitor follow up       HPI: Here for Afib (dx 7/15 when admitted with CVA), acute CVA with hemorrhagic transformation after TPA 7/15 at [facility]   Carotid US 3/2022: mild    Echo 3/2022: normal EF, mod TR and PHTN, mod MR     Recent monitor showing PAF with SSS     She is off inderal now. Some dizziness, some weakness now. NO CP. No new angina, no CP. No real ACUNA. Walking more now. No new edema, illness. More active in home, no angina. Patient denies recent history of orthopnea, PND, excessive dizziness and/or syncope. Doing well on anticoagulation treatment as reviewed today, no bleeding issues or excessive bruising noted. Did not like taste on eliquis in past   pauses on BB with CVA,         Spouse with Dr. Antonella Whiting. Past Medical History, Past Surgical History, Family history, Social History, and Medications were all reviewed with the patient today and updated as necessary. Current Outpatient Medications   Medication Sig Dispense Refill    citalopram (CELEXA) 20 MG tablet Take 1 tablet by mouth daily 30 tablet 5    omeprazole (PRILOSEC) 20 MG delayed release capsule Take 1 capsule by mouth daily 30 capsule 5    traZODone (DESYREL) 50 MG tablet Take 1 tablet by mouth nightly as needed for Sleep 30 tablet 5    LORazepam (ATIVAN) 1 MG tablet Take 1.5 tablets by mouth nightly for 10 days, THEN 1 tablet nightly for 10 days, THEN 0.5 tablets nightly for 10 days.  30 tablet 0    amLODIPine (NORVASC) 5 MG tablet Take 5 mg by mouth daily      ascorbic acid (VITAMIN C) 250 MG tablet Take by mouth      aspirin 81 MG EC tablet Take by mouth daily atorvastatin (LIPITOR) 40 MG tablet Take 40 mg by mouth daily      lisinopril (PRINIVIL;ZESTRIL) 20 MG tablet Take 20 mg by mouth 2 times daily      rivaroxaban (XARELTO) 20 MG TABS tablet Take 20 mg by mouth Daily with supper      spironolactone (ALDACTONE) 25 MG tablet Take 25 mg by mouth daily       No current facility-administered medications for this visit. No Known Allergies  Patient Active Problem List    Diagnosis Date Noted    Iron deficiency anemia 06/18/2019    Long term current use of anticoagulant 06/18/2019    Generalized anxiety disorder 06/18/2019    Bilateral carotid artery stenosis 06/18/2019    Gastroesophageal reflux disease without esophagitis 12/11/2018    Permanent atrial fibrillation ([address] 12/11/2018    Pure hypercholesterolemia 06/07/2018    Essential tremor 12/11/2017    History of CVA (cerebrovascular accident)      Acute CVA with hemorrhagic transformation after TPA 7/16 at [facility]        Bilateral carotid artery disease ([address] 07/21/2017    Primary insomnia 01/17/2017    Paroxysmal atrial fibrillation ([address]      Did not like the taste of low [place] eliquis  Dx 7/2015 when admitted for CVA        Pulmonary hypertension ([address]      Echo 7/15 [facility]: normal EF, mod MR, Mod TR, RSP 60        SSS (sick sinus syndrome) ([address]      Tachycardia-bradycardia  Kody pauses with BB after CVA 7/15        Mitral valve regurgitation       Past Surgical History:   Procedure Laterality Date    COLONOSCOPY  2011    GYN      sharonda--years ago    ORTHOPEDIC SURGERY  2013    left total knee     Family History   Problem Relation Age of Onset    Stroke Mother     Heart Disease Father      Social History     Tobacco Use    Smoking status: Never    Smokeless tobacco: Never   Substance Use Topics    Alcohol use: No     Alcohol/week: 0.0 standard drinks         ROS:    No obvious pertinent positives on review of systems except for what was outlined in the HPI today.       PHYSICAL EXAM:     /76   Pulse 64   Ht 5' 1\" (1.549 m)   Wt 156 lb (70.8 kg)   BMI 29.48 kg/m²    General/Constitutional:   Alert and oriented x 3, no acute distress  HEENT:   normocephalic, atraumatic, moist mucous membranes  Neck:   No JVD or carotid bruits bilaterally  Cardiovascular:   regular rate and rhythm, no murmur/rub/gallop appreciated  Pulmonary:   clear to auscultation bilaterally, no respiratory distress  Abdomen:   soft, non-tender, non-distended  Ext:   No sig LE edema bilaterally  Skin:  warm and dry, no obvious rashes seen  Neuro:   no obvious sensory or motor deficits  Psychiatric:   normal mood and affect      Lab Results   Component Value Date/Time     12/02/2022 09:17 AM    K 4.5 12/02/2022 09:17 AM     12/02/2022 09:17 AM    CO2 23 12/02/2022 09:17 AM    BUN 14 12/02/2022 09:17 AM    CREATININE 0.90 12/02/2022 09:17 AM    GLUCOSE 106 12/02/2022 09:17 AM    CALCIUM 9.8 12/02/2022 09:17 AM        Lab Results   Component Value Date    WBC 9.0 12/02/2022    HGB 13.4 12/02/2022    HCT 41.6 12/02/2022    MCV 95.9 12/02/2022     12/02/2022       Lab Results   Component Value Date    TSH 1.440 12/02/2021       No results found for: LABA1C  No results found for: EAG    Lab Results   Component Value Date    CHOL 166 12/02/2022    CHOL 165 12/02/2021    CHOL 149 06/07/2021     Lab Results   Component Value Date    TRIG 123 12/02/2022    TRIG 133 12/02/2021    TRIG 94 06/07/2021     Lab Results   Component Value Date    HDL 61 (H) 12/02/2022    HDL 51 12/02/2021    HDL 53 06/07/2021     Lab Results   Component Value Date    LDLCALC 80.4 12/02/2022    LDLCALC 91 12/02/2021    LDLCALC 79 06/07/2021     Lab Results   Component Value Date    LABVLDL 24.6 (H) 12/02/2022    LABVLDL 23 06/12/2020    LABVLDL 16 12/11/2019    VLDL 23 12/02/2021    VLDL 17 06/07/2021    VLDL 23 12/01/2020     Lab Results   Component Value Date    CHOLHDLRATIO 2.7 12/02/2022           I have Independently reviewed prior care notes, any ER records available, cardiac testing, labs and results with the patient and before seeing the patient today. Also independently reviewed outside records when available. ASSESSMENT:    Stephanie Avalos was seen today for follow-up chronic condition and results. Diagnoses and all orders for this visit:    SSS (sick sinus syndrome) (HCC)    Paroxysmal atrial fibrillation (HCC)    Nonrheumatic mitral valve regurgitation        PLAN:      1. HTN:  Added aldactone 25, follow K and Cr. Better at home, white coat as reviewed. Edema better on 1/2 dose norvsac. 2. PAF/SSS:    On xarelto, off inderal, refer to EP for consideration of PPM.           I have reviewed their anticoagulation treatment, instructed patient to call with any bleeding issues such as melana or other. The patient will call with any issues related to their treatment. All questions were answered with the patient voicing understanding. 3. MR/pHTN[de-identified]  Echo ok, no worsening ACUNA. Follow PHTN for now. The patient has been instructed to call with any angina or equivalent as reviewed today. All questions were answered with the patient voicing complete understanding. 4. Carotid Dz:   US ok.       5. HPL: continue lipitor. Patient has been instructed and agrees to call our office with any issues or other concerns related to their cardiac condition(s) and/or complaint(s). Return in about 4 months (around 4/14/2023).        LIBERTAD ORTEGA,   12/14/2022

## 2022-12-27 ENCOUNTER — INITIAL CONSULT (OUTPATIENT)
Dept: CARDIOLOGY CLINIC | Age: 82
End: 2022-12-27
Payer: MEDICARE

## 2022-12-27 VITALS
DIASTOLIC BLOOD PRESSURE: 82 MMHG | WEIGHT: 158 LBS | BODY MASS INDEX: 29.83 KG/M2 | HEART RATE: 64 BPM | HEIGHT: 61 IN | SYSTOLIC BLOOD PRESSURE: 128 MMHG

## 2022-12-27 DIAGNOSIS — I49.5 SSS (SICK SINUS SYNDROME) (HCC): Primary | ICD-10-CM

## 2022-12-27 DIAGNOSIS — I77.9 DISORDER OF ARTERIES AND ARTERIOLES, UNSPECIFIED (HCC): ICD-10-CM

## 2022-12-27 DIAGNOSIS — I34.0 NONRHEUMATIC MITRAL VALVE REGURGITATION: ICD-10-CM

## 2022-12-27 DIAGNOSIS — I27.20 PULMONARY HYPERTENSION (HCC): ICD-10-CM

## 2022-12-27 DIAGNOSIS — E78.00 PURE HYPERCHOLESTEROLEMIA: ICD-10-CM

## 2022-12-27 DIAGNOSIS — Z86.73 HISTORY OF CVA (CEREBROVASCULAR ACCIDENT): ICD-10-CM

## 2022-12-27 DIAGNOSIS — I48.11 LONGSTANDING PERSISTENT ATRIAL FIBRILLATION (HCC): ICD-10-CM

## 2022-12-27 DIAGNOSIS — Z79.01 LONG TERM CURRENT USE OF ANTICOAGULANT: ICD-10-CM

## 2022-12-27 DIAGNOSIS — I77.9 BILATERAL CAROTID ARTERY DISEASE, UNSPECIFIED TYPE (HCC): ICD-10-CM

## 2022-12-27 DIAGNOSIS — I48.0 PAROXYSMAL ATRIAL FIBRILLATION (HCC): ICD-10-CM

## 2022-12-27 PROCEDURE — 93000 ELECTROCARDIOGRAM COMPLETE: CPT | Performed by: INTERNAL MEDICINE

## 2022-12-27 PROCEDURE — G8417 CALC BMI ABV UP PARAM F/U: HCPCS | Performed by: INTERNAL MEDICINE

## 2022-12-27 PROCEDURE — 1090F PRES/ABSN URINE INCON ASSESS: CPT | Performed by: INTERNAL MEDICINE

## 2022-12-27 PROCEDURE — G8484 FLU IMMUNIZE NO ADMIN: HCPCS | Performed by: INTERNAL MEDICINE

## 2022-12-27 PROCEDURE — 1036F TOBACCO NON-USER: CPT | Performed by: INTERNAL MEDICINE

## 2022-12-27 PROCEDURE — G8428 CUR MEDS NOT DOCUMENT: HCPCS | Performed by: INTERNAL MEDICINE

## 2022-12-27 PROCEDURE — G8400 PT W/DXA NO RESULTS DOC: HCPCS | Performed by: INTERNAL MEDICINE

## 2022-12-27 PROCEDURE — 99214 OFFICE O/P EST MOD 30 MIN: CPT | Performed by: INTERNAL MEDICINE

## 2022-12-27 PROCEDURE — 1123F ACP DISCUSS/DSCN MKR DOCD: CPT | Performed by: INTERNAL MEDICINE

## 2022-12-27 NOTE — PROGRESS NOTES
[facility] CARDIOLOGY  [address] [facility], [address] [facility], [address] [place] [address]  PHONE: [phone]        22      NAME:  Dilia Zepeda  : 1940  MRN: [id number]     Referring Cardiologist: Apple Mead DO    Reason for Consultation: Atrial fibrillation     ASSESSMENT and PLAN:  Juwan Harmon was seen today for irregular heart beat. Diagnoses and all orders for this visit:    SSS (sick sinus syndrome) ([address]    Longstanding persistent atrial fibrillation (HCC)    Pulmonary hypertension (HCC)    Disorder of arteries and arterioles, unspecified (HCC)    Nonrheumatic mitral valve regurgitation    Bilateral carotid artery disease, unspecified type ([address]    History of CVA (cerebrovascular accident)    Long term current use of anticoagulant    Pure hypercholesterolemia    81year old female with permanent AF with periods of SSS. She did wear a monitor recently which demonstrated a pause in the early AM and then a HR of 44 at 9 AM. She feels improved off Inderal. We discussed that there isn't an absolute need for a PPM but she has at least class Iia/b evidence for a PPM, especially with any symptoms. For now, will continue to monitor symptoms carefully. If worse, she will need a PPM to help with her bradycardia. -SSS - avoid AV node blockers. Carefully assessing symptoms. -LSPersAF - Continue Xarelto. -HTN - continue medicines. -EP follow up in 1 year or PRN. Patient has been instructed and agrees to call our office with any issues or other concerns related to their cardiac condition(s) and/or complaint(s). No follow-up provider specified. Thank you for allowing me to participate in the electrophysiologic care of Ms. Homero Gresham. Please contact me if any questions or concerns were to arise. Alyssa Castañeda.  Ozzy RAPHAEL, MS  Clinical Cardiac Electrophysiology  [facility] Cardiology  22  2:14 PM    ===================================================================  Chief Complant:    Chief Complaint   Patient presents with    Irregular Heart Beat      Consultation is requested by No ref. provider found for evaluation of Irregular Heart Beat    History:  Mika Waters is a most pleasant 81 y.o. female with a past medical and cardiac history significant for TIA with hemorrhagic transformation in 7/15. She has LSPersAF. She is referred by Dr. Ni Carrion for an EP evaluation. She recently wore a monitor which demonstrated periods of SSS. Her issues were made worse by Inderal, Rxed for a tremor. Since stopping her Inderal, her pulse has been much better. Cardiac PMH: (Old records have been reviewed and summarized below)  Carotid US 3/2022: mild   Echo 3/2022: normal EF, mod TR and PHTN, mod MR    EKG:  (EKG has been independently visualized by me with interpretation below): Atrial fibrillation, controlled HR, normal axis, NSST changes. Monitor: 11/2022, [employer], 100% AF, 3 second pause occurred at 4 am, periods of RVR. She did have a HR of 44 at 9 am in the morning as well     ECHO: 3/2022    Left Ventricle: Left ventricle size is normal. Normal wall thickness. Normal wall motion. Normal left ventricular systolic function with a visually estimated EF of 55 - 60%. Abnormal diastolic function. Mitral Valve: Moderate transvalvular regurgitation. Tricuspid Valve: Moderate transvalvular regurgitation. RVSP is 60 mmHg. Left Atrium: Left atrium is severely dilated. LA Vol Index is  37 ml/m2. Right Atrium: Right atrium is severely dilated. Previous Heart Catheterization: n/a     Stress Test: n/a     DEVICE INTERROGATION: n/a     Past Medical History, Past Surgical History, Family history, Social History, and Medications were all reviewed with the patient today and updated as necessary.      Current Outpatient Medications   Medication Sig Dispense Refill    citalopram (CELEXA) 20 MG tablet Take 1 tablet by mouth daily 30 tablet 5    omeprazole (PRILOSEC) 20 MG delayed release capsule Take 1 capsule by mouth daily 30 capsule 5    traZODone (DESYREL) 50 MG tablet Take 1 tablet by mouth nightly as needed for Sleep 30 tablet 5    LORazepam (ATIVAN) 1 MG tablet Take 1.5 tablets by mouth nightly for 10 days, THEN 1 tablet nightly for 10 days, THEN 0.5 tablets nightly for 10 days. 30 tablet 0    amLODIPine (NORVASC) 5 MG tablet Take 5 mg by mouth daily      ascorbic acid (VITAMIN C) 250 MG tablet Take by mouth      aspirin 81 MG EC tablet Take by mouth daily      atorvastatin (LIPITOR) 40 MG tablet Take 40 mg by mouth daily      lisinopril (PRINIVIL;ZESTRIL) 20 MG tablet Take 20 mg by mouth 2 times daily      rivaroxaban (XARELTO) 20 MG TABS tablet Take 20 mg by mouth Daily with supper      spironolactone (ALDACTONE) 25 MG tablet Take 25 mg by mouth daily       No current facility-administered medications for this visit.      No Known Allergies    Past Medical History:   Diagnosis Date    Carotid stenosis     With infarct    Cerebral vascular accident [facility]) 07/2015    CVA [facility]cerebrovascular [facility]) ([facility]     Depression     GERD (gastroesophageal reflux disease)     Hypercholesterolemia     Hypertension     Mitral valve regurgitation     Paroxysmal atrial fibrillation ([facility]     Pulmonary hypertension (HCC)     SSS (sick sinus syndrome) ([facility]     Tachycardia-bradycardia    Stroke ([facility] 2015     Past Surgical History:   Procedure Laterality Date    COLONOSCOPY  2011    GYN      sharonda--years ago    ORTHOPEDIC SURGERY  2013    left total knee     Family History   Problem Relation Age of Onset    Stroke Mother     Heart Disease Father      Social History     Tobacco Use    Smoking status: Never    Smokeless tobacco: Never   Substance Use Topics    Alcohol use: No     Alcohol/week: 0.0 standard drinks       ROS:  A comprehensive review of systems was performed with the pertinent positives and negatives as noted in the HPI in addition to:  Review of Systems      PHYSICAL EXAM:   /82   Pulse 64   Ht 5' 1\" (1.549 m)   Wt 158 lb (71.7 kg)   BMI 29.85 kg/m²      Wt Readings from Last 3 Encounters:   12/27/22 158 lb (71.7 kg)   12/14/22 156 lb (70.8 kg)   12/09/22 158 lb (71.7 kg)     BP Readings from Last 3 Encounters:   12/27/22 128/82   12/14/22 120/76   12/09/22 118/78       Gen: Well appearing, well developed, no acute distress  Eyes: Pupils equal, round. Extraocular movements are intact  ENT: Oropharynx clear, no oral lesions, normal dentition  CV: S1S2, IRIR, no murmurs, rubs or gallops, normal JVD, no carotid bruits, normal distal pulses, no MAURICE  Pulm: Clear to auscultation bilaterally, no accessory muscle uses, no wheezes or rales  GI: Soft, NT, ND, +BS  Neuro: Alert and oriented, nonfocal  Psych: Appropriate affect  Skin: Normal color and skin turgor  MSK: Normal muscle bulk and tone    Medical problems and test results were reviewed with the patient today. No results found for any visits on 12/27/22.

## 2023-01-10 ENCOUNTER — TELEPHONE (OUTPATIENT)
Dept: FAMILY MEDICINE CLINIC | Facility: CLINIC | Age: 83
End: 2023-01-10

## 2023-01-10 NOTE — TELEPHONE ENCOUNTER
Patient's  called stating she was changed from ativan to trazodone and it has caused her to have slurred speech and dizziness and is wanting something different.

## 2023-01-10 NOTE — TELEPHONE ENCOUNTER
----- Message from Ethan Loving sent at 1/9/2023 11:04 AM EST -----  Subject: Medication Problem    Medication: traZODone (DESYREL) 50 MG tablet  Dosage: 50mg  Ordering Provider: omkar    Question/Problem: Trazodone is making patient have a stuffy nose,   confusion, cant get \"thoughts out\" according to  Yuan Luis.  He is on   [facility]      [id number] [phone] [facility] [address] [facility] [place] [phone]    ---------------------------------------------------------------------------  --------------  Fabian SALAZAR  [phone]; OK to leave message on voicemail  ---------------------------------------------------------------------------  --------------    SCRIPT ANSWERS  Relationship to Patient: Other  Representative Name: Yuan Luis  Is the Representative on the appropriate [facility] document in Epic: Yes

## 2023-01-13 RX ORDER — MIRTAZAPINE 7.5 MG/1
7.5 TABLET, FILM COATED ORAL NIGHTLY
Qty: 30 TABLET | Refills: 5 | Status: SHIPPED | OUTPATIENT
Start: 2023-01-13

## 2023-02-09 ENCOUNTER — APPOINTMENT (OUTPATIENT)
Dept: CT IMAGING | Age: 83
DRG: 064 | End: 2023-02-09
Payer: MEDICARE

## 2023-02-09 ENCOUNTER — APPOINTMENT (OUTPATIENT)
Dept: GENERAL RADIOLOGY | Age: 83
DRG: 064 | End: 2023-02-09
Payer: MEDICARE

## 2023-02-09 ENCOUNTER — HOSPITAL ENCOUNTER (INPATIENT)
Age: 83
LOS: 6 days | Discharge: HOME | DRG: 064 | End: 2023-02-16
Attending: EMERGENCY MEDICINE | Admitting: FAMILY MEDICINE
Payer: MEDICARE

## 2023-02-09 DIAGNOSIS — I63.9 CEREBROVASCULAR ACCIDENT (CVA), UNSPECIFIED MECHANISM (HCC): Primary | ICD-10-CM

## 2023-02-09 LAB
ALBUMIN SERPL-MCNC: 3.9 G/DL (ref 3.2–4.6)
ALBUMIN/GLOB SERPL: 1.2 (ref 0.4–1.6)
ALP SERPL-CCNC: 82 U/L (ref 50–136)
ALT SERPL-CCNC: 36 U/L (ref 12–65)
ANION GAP SERPL CALC-SCNC: 11 MMOL/L (ref 2–11)
APPEARANCE UR: CLEAR
AST SERPL-CCNC: 25 U/L (ref 15–37)
BACTERIA URNS QL MICRO: NEGATIVE /HPF
BILIRUB SERPL-MCNC: 0.6 MG/DL (ref 0.2–1.1)
BILIRUB UR QL: NEGATIVE
BUN SERPL-MCNC: 7 MG/DL (ref 8–23)
CALCIUM SERPL-MCNC: 9.1 MG/DL (ref 8.3–10.4)
CASTS URNS QL MICRO: ABNORMAL /LPF
CHLORIDE SERPL-SCNC: 91 MMOL/L (ref 101–110)
CO2 SERPL-SCNC: 25 MMOL/L (ref 21–32)
COLOR UR: ABNORMAL
CREAT SERPL-MCNC: 0.7 MG/DL (ref 0.6–1)
EPI CELLS #/AREA URNS HPF: ABNORMAL /HPF
ERYTHROCYTE [DISTWIDTH] IN BLOOD BY AUTOMATED COUNT: 14.4 % (ref 11.9–14.6)
GLOBULIN SER CALC-MCNC: 3.3 G/DL (ref 2.8–4.5)
GLUCOSE BLD STRIP.AUTO-MCNC: 124 MG/DL (ref 65–100)
GLUCOSE SERPL-MCNC: 119 MG/DL (ref 65–100)
GLUCOSE UR STRIP.AUTO-MCNC: NEGATIVE MG/DL
HCT VFR BLD AUTO: 40.1 % (ref 35.8–46.3)
HGB BLD-MCNC: 13.7 G/DL (ref 11.7–15.4)
HGB UR QL STRIP: NEGATIVE
INR BLD: 1.2 (ref 0.9–1.2)
INR PPP: 1.5
KETONES UR QL STRIP.AUTO: NEGATIVE MG/DL
LEUKOCYTE ESTERASE UR QL STRIP.AUTO: NEGATIVE
MCH RBC QN AUTO: 31.7 PG (ref 26.1–32.9)
MCHC RBC AUTO-ENTMCNC: 34.2 G/DL (ref 31.4–35)
MCV RBC AUTO: 92.8 FL (ref 82–102)
NITRITE UR QL STRIP.AUTO: NEGATIVE
NRBC # BLD: 0 K/UL (ref 0–0.2)
PH UR STRIP: 7.5 (ref 5–9)
PLATELET # BLD AUTO: 251 K/UL (ref 150–450)
PMV BLD AUTO: 9.6 FL (ref 9.4–12.3)
POTASSIUM SERPL-SCNC: 2.9 MMOL/L (ref 3.5–5.1)
PROT SERPL-MCNC: 7.2 G/DL (ref 6.3–8.2)
PROT UR STRIP-MCNC: 30 MG/DL
PROTHROMBIN TIME: 18.5 SEC (ref 12.6–14.3)
PT BLD: 14 SECS (ref 9.6–11.6)
RBC # BLD AUTO: 4.32 M/UL (ref 4.05–5.2)
RBC #/AREA URNS HPF: ABNORMAL /HPF
SERVICE CMNT-IMP: ABNORMAL
SODIUM SERPL-SCNC: 127 MMOL/L (ref 133–143)
SP GR UR REFRACTOMETRY: 1.03 (ref 1–1.02)
TROPONIN I SERPL HS-MCNC: 10.9 PG/ML (ref 0–14)
UROBILINOGEN UR QL STRIP.AUTO: 1 EU/DL (ref 0.2–1)
WBC # BLD AUTO: 8.9 K/UL (ref 4.3–11.1)
WBC URNS QL MICRO: ABNORMAL /HPF

## 2023-02-09 PROCEDURE — 99285 EMERGENCY DEPT VISIT HI MDM: CPT

## 2023-02-09 PROCEDURE — 70450 CT HEAD/BRAIN W/O DYE: CPT

## 2023-02-09 PROCEDURE — 85027 COMPLETE CBC AUTOMATED: CPT

## 2023-02-09 PROCEDURE — 93005 ELECTROCARDIOGRAM TRACING: CPT | Performed by: EMERGENCY MEDICINE

## 2023-02-09 PROCEDURE — 96374 THER/PROPH/DIAG INJ IV PUSH: CPT

## 2023-02-09 PROCEDURE — 70498 CT ANGIOGRAPHY NECK: CPT

## 2023-02-09 PROCEDURE — 84484 ASSAY OF TROPONIN QUANT: CPT

## 2023-02-09 PROCEDURE — 71045 X-RAY EXAM CHEST 1 VIEW: CPT

## 2023-02-09 PROCEDURE — 81001 URINALYSIS AUTO W/SCOPE: CPT

## 2023-02-09 PROCEDURE — 83735 ASSAY OF MAGNESIUM: CPT

## 2023-02-09 PROCEDURE — 85610 PROTHROMBIN TIME: CPT

## 2023-02-09 PROCEDURE — 2500000003 HC RX 250 WO HCPCS: Performed by: EMERGENCY MEDICINE

## 2023-02-09 PROCEDURE — 36415 COLL VENOUS BLD VENIPUNCTURE: CPT

## 2023-02-09 PROCEDURE — 80053 COMPREHEN METABOLIC PANEL: CPT

## 2023-02-09 PROCEDURE — 82962 GLUCOSE BLOOD TEST: CPT

## 2023-02-09 PROCEDURE — 6360000004 HC RX CONTRAST MEDICATION: Performed by: EMERGENCY MEDICINE

## 2023-02-09 RX ORDER — SODIUM CHLORIDE 0.9 % (FLUSH) 0.9 %
10 SYRINGE (ML) INJECTION
Status: COMPLETED | OUTPATIENT
Start: 2023-02-09 | End: 2023-02-10

## 2023-02-09 RX ORDER — LABETALOL HYDROCHLORIDE 5 MG/ML
10 INJECTION, SOLUTION INTRAVENOUS ONCE
Status: COMPLETED | OUTPATIENT
Start: 2023-02-09 | End: 2023-02-09

## 2023-02-09 RX ORDER — 0.9 % SODIUM CHLORIDE 0.9 %
100 INTRAVENOUS SOLUTION INTRAVENOUS
Status: DISCONTINUED | OUTPATIENT
Start: 2023-02-09 | End: 2023-02-16 | Stop reason: HOSPADM

## 2023-02-09 RX ADMIN — LABETALOL HYDROCHLORIDE 10 MG: 5 INJECTION INTRAVENOUS at 22:53

## 2023-02-09 RX ADMIN — IOHEXOL 100 ML: 350 INJECTION, SOLUTION INTRAVENOUS at 22:00

## 2023-02-09 ASSESSMENT — LIFESTYLE VARIABLES
HOW OFTEN DO YOU HAVE A DRINK CONTAINING ALCOHOL: NEVER
HOW MANY STANDARD DRINKS CONTAINING ALCOHOL DO YOU HAVE ON A TYPICAL DAY: PATIENT DOES NOT DRINK

## 2023-02-09 ASSESSMENT — ENCOUNTER SYMPTOMS
EYES NEGATIVE: 1
RESPIRATORY NEGATIVE: 1
GASTROINTESTINAL NEGATIVE: 1

## 2023-02-09 ASSESSMENT — PAIN DESCRIPTION - RADICULAR PAIN: RADICULAR_PAIN: ABSENT

## 2023-02-10 ENCOUNTER — APPOINTMENT (OUTPATIENT)
Dept: NON INVASIVE DIAGNOSTICS | Age: 83
DRG: 064 | End: 2023-02-10
Payer: MEDICARE

## 2023-02-10 ENCOUNTER — APPOINTMENT (OUTPATIENT)
Dept: MRI IMAGING | Age: 83
DRG: 064 | End: 2023-02-10
Payer: MEDICARE

## 2023-02-10 PROBLEM — E87.1 HYPONATREMIA: Status: ACTIVE | Noted: 2023-02-10

## 2023-02-10 PROBLEM — F51.01 PRIMARY INSOMNIA: Status: ACTIVE | Noted: 2017-01-17

## 2023-02-10 PROBLEM — G93.41 ACUTE METABOLIC ENCEPHALOPATHY: Status: ACTIVE | Noted: 2023-02-10

## 2023-02-10 PROBLEM — G25.0 ESSENTIAL TREMOR: Status: ACTIVE | Noted: 2017-12-11

## 2023-02-10 PROBLEM — E87.6 HYPOKALEMIA: Status: ACTIVE | Noted: 2023-02-10

## 2023-02-10 PROBLEM — R29.90 STROKE-LIKE SYMPTOMS: Status: ACTIVE | Noted: 2023-02-10

## 2023-02-10 PROBLEM — J96.01 ACUTE RESPIRATORY FAILURE WITH HYPOXIA (HCC): Status: ACTIVE | Noted: 2023-02-10

## 2023-02-10 PROBLEM — I48.21 PERMANENT ATRIAL FIBRILLATION (HCC): Status: ACTIVE | Noted: 2018-12-11

## 2023-02-10 PROBLEM — Z79.01 LONG TERM CURRENT USE OF ANTICOAGULANT: Status: ACTIVE | Noted: 2019-06-18

## 2023-02-10 PROBLEM — K21.9 GASTROESOPHAGEAL REFLUX DISEASE WITHOUT ESOPHAGITIS: Status: ACTIVE | Noted: 2018-12-11

## 2023-02-10 PROBLEM — E78.00 PURE HYPERCHOLESTEROLEMIA: Status: ACTIVE | Noted: 2018-06-07

## 2023-02-10 PROBLEM — I77.9 BILATERAL CAROTID ARTERY DISEASE (HCC): Status: ACTIVE | Noted: 2017-07-21

## 2023-02-10 PROBLEM — F41.1 GENERALIZED ANXIETY DISORDER: Status: ACTIVE | Noted: 2019-06-18

## 2023-02-10 LAB
ANION GAP SERPL CALC-SCNC: 10 MMOL/L (ref 2–11)
APPEARANCE UR: CLEAR
BACTERIA URNS QL MICRO: NEGATIVE /HPF
BILIRUB UR QL: NEGATIVE
BUN SERPL-MCNC: 3 MG/DL (ref 8–23)
CALCIUM SERPL-MCNC: 8.8 MG/DL (ref 8.3–10.4)
CASTS URNS QL MICRO: ABNORMAL /LPF
CHLORIDE SERPL-SCNC: 92 MMOL/L (ref 101–110)
CO2 SERPL-SCNC: 29 MMOL/L (ref 21–32)
COLOR UR: ABNORMAL
CREAT SERPL-MCNC: 0.6 MG/DL (ref 0.6–1)
CREAT UR-MCNC: 13 MG/DL
D DIMER PPP FEU-MCNC: 0.48 UG/ML(FEU)
ECHO AO ASC DIAM: 2.5 CM
ECHO AO ASCENDING AORTA INDEX: 1.46 CM/M2
ECHO AO ROOT DIAM: 2.6 CM
ECHO AO ROOT INDEX: 1.52 CM/M2
ECHO AV AREA PEAK VELOCITY: 2.3 CM2
ECHO AV AREA VTI: 2.4 CM2
ECHO AV AREA/BSA PEAK VELOCITY: 1.3 CM2/M2
ECHO AV AREA/BSA VTI: 1.4 CM2/M2
ECHO AV MEAN GRADIENT: 4 MMHG
ECHO AV MEAN VELOCITY: 0.9 M/S
ECHO AV PEAK GRADIENT: 7 MMHG
ECHO AV PEAK VELOCITY: 1.3 M/S
ECHO AV VELOCITY RATIO: 0.77
ECHO AV VTI: 25.7 CM
ECHO EST RA PRESSURE: 3 MMHG
ECHO IVC PROX: 1.6 CM
ECHO LA AREA 2C: 18.9 CM2
ECHO LA AREA 4C: 22.1 CM2
ECHO LA DIAMETER INDEX: 2.81 CM/M2
ECHO LA DIAMETER: 4.8 CM
ECHO LA MAJOR AXIS: 5.9 CM
ECHO LA MINOR AXIS: 5.7 CM
ECHO LA TO AORTIC ROOT RATIO: 1.85
ECHO LA VOL 2C: 51 ML (ref 22–52)
ECHO LA VOL 4C: 67 ML (ref 22–52)
ECHO LA VOL BP: 59 ML (ref 22–52)
ECHO LA VOL/BSA BIPLANE: 35 ML/M2 (ref 16–34)
ECHO LA VOLUME INDEX A2C: 30 ML/M2 (ref 16–34)
ECHO LA VOLUME INDEX A4C: 39 ML/M2 (ref 16–34)
ECHO LV E' LATERAL VELOCITY: 14 CM/S
ECHO LV E' SEPTAL VELOCITY: 12 CM/S
ECHO LV EDV A2C: 50 ML
ECHO LV EDV A4C: 55 ML
ECHO LV EDV INDEX A4C: 32 ML/M2
ECHO LV EDV NDEX A2C: 29 ML/M2
ECHO LV EJECTION FRACTION A2C: 61 %
ECHO LV EJECTION FRACTION A4C: 69 %
ECHO LV EJECTION FRACTION BIPLANE: 66 % (ref 55–100)
ECHO LV ESV A2C: 19 ML
ECHO LV ESV A4C: 17 ML
ECHO LV ESV INDEX A2C: 11 ML/M2
ECHO LV ESV INDEX A4C: 10 ML/M2
ECHO LV FRACTIONAL SHORTENING: 34 % (ref 28–44)
ECHO LV INTERNAL DIMENSION DIASTOLE INDEX: 2.75 CM/M2
ECHO LV INTERNAL DIMENSION DIASTOLIC: 4.7 CM (ref 3.9–5.3)
ECHO LV INTERNAL DIMENSION SYSTOLIC INDEX: 1.81 CM/M2
ECHO LV INTERNAL DIMENSION SYSTOLIC: 3.1 CM
ECHO LV IVSD: 0.8 CM (ref 0.6–0.9)
ECHO LV MASS 2D: 132.3 G (ref 67–162)
ECHO LV MASS INDEX 2D: 77.4 G/M2 (ref 43–95)
ECHO LV POSTERIOR WALL DIASTOLIC: 0.9 CM (ref 0.6–0.9)
ECHO LV RELATIVE WALL THICKNESS RATIO: 0.38
ECHO LVOT AREA: 3.1 CM2
ECHO LVOT AV VTI INDEX: 0.76
ECHO LVOT DIAM: 2 CM
ECHO LVOT MEAN GRADIENT: 2 MMHG
ECHO LVOT PEAK GRADIENT: 4 MMHG
ECHO LVOT PEAK VELOCITY: 1 M/S
ECHO LVOT STROKE VOLUME INDEX: 36 ML/M2
ECHO LVOT SV: 61.5 ML
ECHO LVOT VTI: 19.6 CM
ECHO MV A VELOCITY: 0.31 M/S
ECHO MV E DECELERATION TIME (DT): 180 MS
ECHO MV E VELOCITY: 1.04 M/S
ECHO MV E/A RATIO: 3.35
ECHO MV E/E' LATERAL: 7.43
ECHO MV E/E' RATIO (AVERAGED): 8.05
ECHO MV E/E' SEPTAL: 8.67
ECHO MV REGURGITANT PEAK GRADIENT: 144 MMHG
ECHO MV REGURGITANT PEAK VELOCITY: 6 M/S
ECHO MV REGURGITANT VTIA: 192 CM
ECHO PV ACCELERATION TIME (AT): 114 MS
ECHO PV MAX VELOCITY: 1 M/S
ECHO PV PEAK GRADIENT: 4 MMHG
ECHO RIGHT VENTRICULAR SYSTOLIC PRESSURE (RVSP): 48 MMHG
ECHO RV BASAL DIMENSION: 3.2 CM
ECHO RV FREE WALL PEAK S': 17 CM/S
ECHO RV INTERNAL DIMENSION: 2.9 CM
ECHO RV TAPSE: 1.9 CM (ref 1.7–?)
ECHO TV REGURGITANT MAX VELOCITY: 3.37 M/S
ECHO TV REGURGITANT PEAK GRADIENT: 45 MMHG
EKG ATRIAL RATE: 169 BPM
EKG DIAGNOSIS: NORMAL
EKG Q-T INTERVAL: 463 MS
EKG QRS DURATION: 76 MS
EKG QTC CALCULATION (BAZETT): 564 MS
EKG R AXIS: 89 DEGREES
EKG T AXIS: 49 DEGREES
EKG VENTRICULAR RATE: 89 BPM
EPI CELLS #/AREA URNS HPF: ABNORMAL /HPF
FLUAV RNA SPEC QL NAA+PROBE: NOT DETECTED
FLUBV RNA SPEC QL NAA+PROBE: NOT DETECTED
GLUCOSE SERPL-MCNC: 106 MG/DL (ref 65–100)
GLUCOSE UR STRIP.AUTO-MCNC: NEGATIVE MG/DL
HGB UR QL STRIP: ABNORMAL
KETONES UR QL STRIP.AUTO: NEGATIVE MG/DL
LEUKOCYTE ESTERASE UR QL STRIP.AUTO: NEGATIVE
LV EF: 63 %
LVEF MODALITY: ABNORMAL
MAGNESIUM SERPL-MCNC: 1.1 MG/DL (ref 1.8–2.4)
MAGNESIUM SERPL-MCNC: 1.2 MG/DL (ref 1.8–2.4)
MAGNESIUM SERPL-MCNC: 1.6 MG/DL (ref 1.8–2.4)
MUCOUS THREADS URNS QL MICRO: 0 /LPF
NITRITE UR QL STRIP.AUTO: NEGATIVE
NT PRO BNP: 2449 PG/ML
OSMOLALITY UR: 318 MOSM/KG H2O (ref 50–1400)
PH UR STRIP: 8 (ref 5–9)
POTASSIUM SERPL-SCNC: 2.9 MMOL/L (ref 3.5–5.1)
POTASSIUM SERPL-SCNC: 3 MMOL/L (ref 3.5–5.1)
PROT UR STRIP-MCNC: 30 MG/DL
RBC #/AREA URNS HPF: ABNORMAL /HPF
SARS-COV-2 RDRP RESP QL NAA+PROBE: NOT DETECTED
SODIUM SERPL-SCNC: 128 MMOL/L (ref 133–143)
SODIUM SERPL-SCNC: 128 MMOL/L (ref 133–143)
SODIUM SERPL-SCNC: 131 MMOL/L (ref 133–143)
SODIUM UR-SCNC: 124 MMOL/L
SOURCE: NORMAL
SP GR UR REFRACTOMETRY: 1.01 (ref 1–1.02)
URINE CULTURE IF INDICATED: ABNORMAL
UROBILINOGEN UR QL STRIP.AUTO: 0.2 EU/DL (ref 0.2–1)
WBC URNS QL MICRO: ABNORMAL /HPF

## 2023-02-10 PROCEDURE — 97530 THERAPEUTIC ACTIVITIES: CPT

## 2023-02-10 PROCEDURE — 2500000003 HC RX 250 WO HCPCS: Performed by: FAMILY MEDICINE

## 2023-02-10 PROCEDURE — 92610 EVALUATE SWALLOWING FUNCTION: CPT

## 2023-02-10 PROCEDURE — 94760 N-INVAS EAR/PLS OXIMETRY 1: CPT

## 2023-02-10 PROCEDURE — 99223 1ST HOSP IP/OBS HIGH 75: CPT | Performed by: STUDENT IN AN ORGANIZED HEALTH CARE EDUCATION/TRAINING PROGRAM

## 2023-02-10 PROCEDURE — 84295 ASSAY OF SERUM SODIUM: CPT

## 2023-02-10 PROCEDURE — 80048 BASIC METABOLIC PNL TOTAL CA: CPT

## 2023-02-10 PROCEDURE — 83935 ASSAY OF URINE OSMOLALITY: CPT

## 2023-02-10 PROCEDURE — 93306 TTE W/DOPPLER COMPLETE: CPT

## 2023-02-10 PROCEDURE — 36415 COLL VENOUS BLD VENIPUNCTURE: CPT

## 2023-02-10 PROCEDURE — 6370000000 HC RX 637 (ALT 250 FOR IP): Performed by: FAMILY MEDICINE

## 2023-02-10 PROCEDURE — 83880 ASSAY OF NATRIURETIC PEPTIDE: CPT

## 2023-02-10 PROCEDURE — 84132 ASSAY OF SERUM POTASSIUM: CPT

## 2023-02-10 PROCEDURE — 1100000003 HC PRIVATE W/ TELEMETRY

## 2023-02-10 PROCEDURE — 2700000000 HC OXYGEN THERAPY PER DAY

## 2023-02-10 PROCEDURE — 97535 SELF CARE MNGMENT TRAINING: CPT

## 2023-02-10 PROCEDURE — 6370000000 HC RX 637 (ALT 250 FOR IP): Performed by: STUDENT IN AN ORGANIZED HEALTH CARE EDUCATION/TRAINING PROGRAM

## 2023-02-10 PROCEDURE — 87635 SARS-COV-2 COVID-19 AMP PRB: CPT

## 2023-02-10 PROCEDURE — 93306 TTE W/DOPPLER COMPLETE: CPT | Performed by: INTERNAL MEDICINE

## 2023-02-10 PROCEDURE — 97166 OT EVAL MOD COMPLEX 45 MIN: CPT

## 2023-02-10 PROCEDURE — 6360000002 HC RX W HCPCS: Performed by: STUDENT IN AN ORGANIZED HEALTH CARE EDUCATION/TRAINING PROGRAM

## 2023-02-10 PROCEDURE — 97162 PT EVAL MOD COMPLEX 30 MIN: CPT

## 2023-02-10 PROCEDURE — 82570 ASSAY OF URINE CREATININE: CPT

## 2023-02-10 PROCEDURE — 97112 NEUROMUSCULAR REEDUCATION: CPT

## 2023-02-10 PROCEDURE — 6360000002 HC RX W HCPCS: Performed by: FAMILY MEDICINE

## 2023-02-10 PROCEDURE — 2580000003 HC RX 258: Performed by: EMERGENCY MEDICINE

## 2023-02-10 PROCEDURE — 2580000003 HC RX 258: Performed by: FAMILY MEDICINE

## 2023-02-10 PROCEDURE — 85379 FIBRIN DEGRADATION QUANT: CPT

## 2023-02-10 PROCEDURE — 83735 ASSAY OF MAGNESIUM: CPT

## 2023-02-10 PROCEDURE — 84300 ASSAY OF URINE SODIUM: CPT

## 2023-02-10 PROCEDURE — 87502 INFLUENZA DNA AMP PROBE: CPT

## 2023-02-10 PROCEDURE — 81001 URINALYSIS AUTO W/SCOPE: CPT

## 2023-02-10 RX ORDER — LISINOPRIL 20 MG/1
40 TABLET ORAL DAILY
Status: DISCONTINUED | OUTPATIENT
Start: 2023-02-10 | End: 2023-02-10

## 2023-02-10 RX ORDER — LABETALOL HYDROCHLORIDE 5 MG/ML
10 INJECTION, SOLUTION INTRAVENOUS EVERY 10 MIN PRN
Status: DISCONTINUED | OUTPATIENT
Start: 2023-02-10 | End: 2023-02-16 | Stop reason: HOSPADM

## 2023-02-10 RX ORDER — FUROSEMIDE 10 MG/ML
40 INJECTION INTRAMUSCULAR; INTRAVENOUS 2 TIMES DAILY
Status: DISCONTINUED | OUTPATIENT
Start: 2023-02-10 | End: 2023-02-11

## 2023-02-10 RX ORDER — ONDANSETRON 4 MG/1
4 TABLET, ORALLY DISINTEGRATING ORAL EVERY 8 HOURS PRN
Status: DISCONTINUED | OUTPATIENT
Start: 2023-02-10 | End: 2023-02-16 | Stop reason: HOSPADM

## 2023-02-10 RX ORDER — ACETAMINOPHEN 325 MG/1
650 TABLET ORAL EVERY 4 HOURS PRN
Status: DISCONTINUED | OUTPATIENT
Start: 2023-02-10 | End: 2023-02-16 | Stop reason: HOSPADM

## 2023-02-10 RX ORDER — LORAZEPAM 1 MG/1
1 TABLET ORAL PRN
Status: ON HOLD | COMMUNITY
End: 2023-02-16 | Stop reason: HOSPADM

## 2023-02-10 RX ORDER — POTASSIUM CHLORIDE 20 MEQ/1
40 TABLET, EXTENDED RELEASE ORAL PRN
Status: DISCONTINUED | OUTPATIENT
Start: 2023-02-10 | End: 2023-02-16 | Stop reason: HOSPADM

## 2023-02-10 RX ORDER — ATORVASTATIN CALCIUM 40 MG/1
80 TABLET, FILM COATED ORAL NIGHTLY
Status: DISCONTINUED | OUTPATIENT
Start: 2023-02-10 | End: 2023-02-16 | Stop reason: HOSPADM

## 2023-02-10 RX ORDER — PANTOPRAZOLE SODIUM 40 MG/1
40 TABLET, DELAYED RELEASE ORAL
Status: DISCONTINUED | OUTPATIENT
Start: 2023-02-10 | End: 2023-02-16 | Stop reason: HOSPADM

## 2023-02-10 RX ORDER — MAGNESIUM SULFATE IN WATER 40 MG/ML
2000 INJECTION, SOLUTION INTRAVENOUS PRN
Status: DISCONTINUED | OUTPATIENT
Start: 2023-02-10 | End: 2023-02-16 | Stop reason: HOSPADM

## 2023-02-10 RX ORDER — LORAZEPAM 2 MG/ML
1 INJECTION INTRAMUSCULAR ONCE
Status: COMPLETED | OUTPATIENT
Start: 2023-02-10 | End: 2023-02-10

## 2023-02-10 RX ORDER — LORAZEPAM 1 MG/1
2 TABLET ORAL ONCE
Status: COMPLETED | OUTPATIENT
Start: 2023-02-10 | End: 2023-02-10

## 2023-02-10 RX ORDER — POTASSIUM CHLORIDE 20 MEQ/1
40 TABLET, EXTENDED RELEASE ORAL ONCE
Status: DISCONTINUED | OUTPATIENT
Start: 2023-02-10 | End: 2023-02-13

## 2023-02-10 RX ORDER — POTASSIUM CHLORIDE 7.45 MG/ML
10 INJECTION INTRAVENOUS PRN
Status: DISCONTINUED | OUTPATIENT
Start: 2023-02-10 | End: 2023-02-16 | Stop reason: HOSPADM

## 2023-02-10 RX ORDER — MIRTAZAPINE 15 MG/1
7.5 TABLET, FILM COATED ORAL NIGHTLY
Status: CANCELLED | OUTPATIENT
Start: 2023-02-10

## 2023-02-10 RX ORDER — SPIRONOLACTONE 25 MG/1
25 TABLET ORAL DAILY
Status: DISCONTINUED | OUTPATIENT
Start: 2023-02-10 | End: 2023-02-16

## 2023-02-10 RX ORDER — POTASSIUM CHLORIDE 20 MEQ/1
40 TABLET, EXTENDED RELEASE ORAL 2 TIMES DAILY WITH MEALS
Status: COMPLETED | OUTPATIENT
Start: 2023-02-10 | End: 2023-02-11

## 2023-02-10 RX ORDER — CITALOPRAM 20 MG/1
20 TABLET ORAL DAILY
Status: DISCONTINUED | OUTPATIENT
Start: 2023-02-10 | End: 2023-02-16 | Stop reason: HOSPADM

## 2023-02-10 RX ORDER — ONDANSETRON 2 MG/ML
4 INJECTION INTRAMUSCULAR; INTRAVENOUS EVERY 6 HOURS PRN
Status: DISCONTINUED | OUTPATIENT
Start: 2023-02-10 | End: 2023-02-16 | Stop reason: HOSPADM

## 2023-02-10 RX ORDER — POLYETHYLENE GLYCOL 3350 17 G/17G
17 POWDER, FOR SOLUTION ORAL DAILY PRN
Status: DISCONTINUED | OUTPATIENT
Start: 2023-02-10 | End: 2023-02-16 | Stop reason: HOSPADM

## 2023-02-10 RX ORDER — LORAZEPAM 2 MG/ML
0.5 INJECTION INTRAMUSCULAR ONCE
Status: DISCONTINUED | OUTPATIENT
Start: 2023-02-10 | End: 2023-02-11

## 2023-02-10 RX ORDER — CLOPIDOGREL BISULFATE 75 MG/1
75 TABLET ORAL DAILY
Status: DISCONTINUED | OUTPATIENT
Start: 2023-02-10 | End: 2023-02-11

## 2023-02-10 RX ORDER — SODIUM CHLORIDE 9 MG/ML
INJECTION, SOLUTION INTRAVENOUS CONTINUOUS
Status: DISCONTINUED | OUTPATIENT
Start: 2023-02-10 | End: 2023-02-10

## 2023-02-10 RX ORDER — ACETAMINOPHEN 650 MG/1
650 SUPPOSITORY RECTAL EVERY 4 HOURS PRN
Status: DISCONTINUED | OUTPATIENT
Start: 2023-02-10 | End: 2023-02-16 | Stop reason: HOSPADM

## 2023-02-10 RX ORDER — MAGNESIUM SULFATE IN WATER 40 MG/ML
2000 INJECTION, SOLUTION INTRAVENOUS ONCE
Status: COMPLETED | OUTPATIENT
Start: 2023-02-10 | End: 2023-02-10

## 2023-02-10 RX ORDER — ASPIRIN 81 MG/1
81 TABLET ORAL DAILY
Status: DISCONTINUED | OUTPATIENT
Start: 2023-02-10 | End: 2023-02-16 | Stop reason: HOSPADM

## 2023-02-10 RX ORDER — POTASSIUM CHLORIDE 20 MEQ/1
40 TABLET, EXTENDED RELEASE ORAL 2 TIMES DAILY WITH MEALS
Status: DISPENSED | OUTPATIENT
Start: 2023-02-10 | End: 2023-02-11

## 2023-02-10 RX ORDER — MORPHINE SULFATE 2 MG/ML
0.5 INJECTION, SOLUTION INTRAMUSCULAR; INTRAVENOUS ONCE
Status: COMPLETED | OUTPATIENT
Start: 2023-02-10 | End: 2023-02-10

## 2023-02-10 RX ADMIN — LORAZEPAM 1 MG: 2 INJECTION INTRAMUSCULAR; INTRAVENOUS at 10:26

## 2023-02-10 RX ADMIN — CITALOPRAM HYDROBROMIDE 20 MG: 20 TABLET ORAL at 13:30

## 2023-02-10 RX ADMIN — FUROSEMIDE 40 MG: 10 INJECTION, SOLUTION INTRAMUSCULAR; INTRAVENOUS at 17:33

## 2023-02-10 RX ADMIN — POTASSIUM CHLORIDE 40 MEQ: 1500 TABLET, EXTENDED RELEASE ORAL at 17:21

## 2023-02-10 RX ADMIN — RIVAROXABAN 20 MG: 20 TABLET, FILM COATED ORAL at 17:22

## 2023-02-10 RX ADMIN — FUROSEMIDE 40 MG: 10 INJECTION, SOLUTION INTRAMUSCULAR; INTRAVENOUS at 13:30

## 2023-02-10 RX ADMIN — MAGNESIUM SULFATE HEPTAHYDRATE 2000 MG: 40 INJECTION, SOLUTION INTRAVENOUS at 17:22

## 2023-02-10 RX ADMIN — MORPHINE SULFATE 0.5 MG: 2 INJECTION, SOLUTION INTRAMUSCULAR; INTRAVENOUS at 10:27

## 2023-02-10 RX ADMIN — LORAZEPAM 2 MG: 1 TABLET ORAL at 04:06

## 2023-02-10 RX ADMIN — ONDANSETRON 4 MG: 2 INJECTION INTRAMUSCULAR; INTRAVENOUS at 02:56

## 2023-02-10 RX ADMIN — SODIUM CHLORIDE, PRESERVATIVE FREE 10 ML: 5 INJECTION INTRAVENOUS at 21:32

## 2023-02-10 RX ADMIN — TUBERCULIN PURIFIED PROTEIN DERIVATIVE 5 UNITS: 5 INJECTION, SOLUTION INTRADERMAL at 17:23

## 2023-02-10 RX ADMIN — SODIUM CHLORIDE: 9 INJECTION, SOLUTION INTRAVENOUS at 02:56

## 2023-02-10 RX ADMIN — ASPIRIN 81 MG: 81 TABLET, COATED ORAL at 13:30

## 2023-02-10 RX ADMIN — ATORVASTATIN CALCIUM 80 MG: 40 TABLET, FILM COATED ORAL at 21:33

## 2023-02-10 RX ADMIN — MAGNESIUM SULFATE HEPTAHYDRATE 2000 MG: 40 INJECTION, SOLUTION INTRAVENOUS at 13:28

## 2023-02-10 RX ADMIN — CLOPIDOGREL BISULFATE 75 MG: 75 TABLET ORAL at 21:33

## 2023-02-10 RX ADMIN — ACETAMINOPHEN 650 MG: 325 TABLET ORAL at 06:19

## 2023-02-10 RX ADMIN — SPIRONOLACTONE 25 MG: 25 TABLET ORAL at 13:29

## 2023-02-10 ASSESSMENT — PAIN SCALES - GENERAL: PAINLEVEL_OUTOF10: 3

## 2023-02-10 ASSESSMENT — PAIN DESCRIPTION - LOCATION: LOCATION: KNEE

## 2023-02-10 ASSESSMENT — PAIN DESCRIPTION - ORIENTATION: ORIENTATION: RIGHT

## 2023-02-10 ASSESSMENT — PAIN DESCRIPTION - DESCRIPTORS: DESCRIPTORS: ACHING

## 2023-02-10 ASSESSMENT — PAIN DESCRIPTION - RADICULAR PAIN: RADICULAR_PAIN: ABSENT

## 2023-02-10 NOTE — ED TRIAGE NOTES
Family called 911 pt spilled a glass of water while eating around 2 hours ago     Has had some aphagia going on per ems     EMS found pt to be Aox4 at her house on the way here pt started to get confused per ems     Ems started 18g L AC   2015 had a old stroke per ems she got TPA     Race Score at pts home was 0, and on the way here race score 2

## 2023-02-10 NOTE — ED NOTES
TRANSFER - OUT REPORT:    Verbal report given to RN on Cecilia Garza  being transferred to [facility] for routine progression of patient care       Report consisted of patient's Situation, Background, Assessment and   Recommendations(SBAR). Information from the following report(s) Nurse Handoff Report, ED Encounter Summary, ED SBAR, Adult Overview, Intake/Output, MAR, Recent Results, Cardiac Rhythm afib and Neuro Assessment was reviewed with the receiving nurse. Josephine Assessment: Presents to emergency department  because of falls (Syncope, seizure, or loss of consciousness): No, Age > 79: Yes, Altered Mental Status, Intoxication with alcohol or substance confusion (Disorientation, impaired judgment, poor safety awaremess, or inability to follow instructions): Yes, Impaired Mobility: Ambulates or transfers with assistive devices or assistance; Unable to ambulate or transer.: Yes, Nursing Judgement: Yes  Lines:   Peripheral IV 02/09/23 Left;Proximal;Anterior Forearm (Active)       Peripheral IV 02/09/23 Right Antecubital (Active)        Opportunity for questions and clarification was provided.       Patient transported with:  Monitor and O2 @ 7lpm           [facility]  02/10/23 [id number]

## 2023-02-10 NOTE — PROGRESS NOTES
Patient was seen and examined at the bedside. She was hypertensive but did not order any medications to allow for permissive hypertension. Chest x-ray showed pulmonary congestion and BNP of 2449. Ordered Lasix IV. For labs reviewed and she was hyponatremic/hypokalemic/hypomagnesemic. Repleted K, magnesium and repeat labs with K of 3 and magnesium 1.6. Ordered magnesium and potassium again. Patient was given Ativan for MRI as she was claustrophobic. Morphine was ordered for her pain. Refer to H&P in a.m. This is nonbillable note.

## 2023-02-10 NOTE — PROGRESS NOTES
Physician Progress Note      PATIENT:               HARISH STONE  CSN #:                  [id number]  :                       1940  ADMIT DATE:       2023 9:26 PM  DISCH DATE:  RESPONDING  PROVIDER #:        Lolly Monk MD          QUERY TEXT:    Patient admitted with Stroke like symptoms--CVA work up. Noted to have atrial   fibrillation and is maintained on Xarelto. If possible, please document in   progress notes and discharge summary if you are evaluating and/or treating any   of the following:    The medical record reflects the following:  Risk Factors: Afib, HTN, HLD, Mitral Valve insufficiency  Clinical Indicators: Afib maintained on Xarelto , EKG --afib  Treatment: Continuation of Xarelto, CT, MRI, labs, essential home meds.  Options provided:  -- Secondary hypercoagulable state in a patient with atrial fibrillation  -- Other - I will add my own diagnosis  -- Disagree - Not applicable / Not valid  -- Disagree - Clinically unable to determine / Unknown  -- Refer to Clinical Documentation Reviewer    PROVIDER RESPONSE TEXT:    This patient has secondary hypercoagulable state in a patient with atrial   fibrillation.    Query created by: Issac Cleary on 2/10/2023 10:54 AM      Electronically signed by:  Lolly Monk MD 2/10/2023 1:17 PM

## 2023-02-10 NOTE — PROGRESS NOTES
ACUTE OCCUPATIONAL THERAPY GOALS:   (Developed with and agreed upon by patient and/or caregiver.)  1. Patient will complete lower body bathing and dressing with SBA and adaptive equipment as   needed. 2. Patient will completed upper body bathing and dressing with Mod I and adaptive equipment as needed. 3. Patient will complete grooming standing at sink with SBA and adaptive equipment as needed. 4. Patient will complete toileting with CGA and adaptive equipment as needed. 5. Patient will tolerate 30 minutes of OT treatment with 1-2 rest breaks to increase activity tolerance for ADLs. 6. Patient will complete functional transfers with CGA and adaptive equipment as needed. Timeframe: 7 visits     OCCUPATIONAL THERAPY Initial Assessment, Daily Note, and AM       OT Visit Days: 1  Acknowledge Orders  Time  OT Charge Capture  Rehab Caseload Tracker      Pily Ceja is a 81 y.o. female   PRIMARY DIAGNOSIS: Stroke-like symptoms  Stroke-like symptoms [R29.90]  Cerebrovascular accident (CVA), unspecified mechanism ([address] [I63.9]       Reason for Referral: Generalized Muscle Weakness (M62.81)  Other abnormalities of gait and mobility (R26.89)  Inpatient: Payor: MEDICARE / Plan: MEDICARE PART A AND B / Product Type: *No Product type* /     ASSESSMENT:     REHAB RECOMMENDATIONS:   Recommendation to date pending progress:  Setting:  Inpatient Rehab Facility    Equipment:    To Be Determined     ASSESSMENT:  Ms. Clyde Bryant is a 82 y/o F presenting with CVA-like symptoms. Pt supine on entry on 6L o2 via NC, A/O x 2. 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 Pt denied light headedness, dizziness or SOB. Pt exhibits sitting without warning, BP monitored and positional hypotension noted. Pt reports no fall(s) in past 6 months. PLOF Mod I, living with spouse. DME present in home; none.  Pt currently CGA-SBA with UB ADLs, Max - Mod A with LB ADLs, Mod A for toileting and Min A x 2 for bed mobility and side steps EOB. Rest breaks utilized as needed throughout session. Pt presents with deficits in ADLs, functional t/fs, household mobility for ADLs and activity tolerance compared to reported PLOF. Pt tolerated session well. Pt presents pleasant and motivated with good rehab potential. Pt would benefit from continued skilled OT services for duration of hospital stay and after t/f to next level of care or until all stated goals met. Pt exhibtis strong potential to return/close to PLOF and would greatly benefit/be able to tolerate intensive therapy 3hrs/day, 5x/wk.        [facility] [address] [address] AM-PAC 6 Clicks Daily Activity Inpatient Short Form:    AM-PAC Daily Activity - Inpatient   How much help is needed for putting on and taking off regular lower body clothing?: A Little  How much help is needed for bathing (which includes washing, rinsing, drying)?: A Little  How much help is needed for toileting (which includes using toilet, bedpan, or urinal)?: A Little  How much help is needed for putting on and taking off regular upper body clothing?: A Little  How much help is needed for taking care of personal grooming?: A Little  How much help for eating meals?: None  [facility] Daily Activity Raw Score: 19  AM-PAC Inpatient ADL T-Scale Score : 40.22  ADL Inpatient CMS 0-100% Score: 42.8  ADL Inpatient CMS G-Code Modifier : CK           SUBJECTIVE:     Ms. Lewis Romero states, \"Ok\"     Social/Functional Lives With: Spouse  Type of Home: House  Bathroom Equipment: Grab bars in shower  Home Equipment: [address], [address] Help From: Family  ADL Assistance: Independent  Homemaking Assistance: Independent  Ambulation Assistance: Independent  Transfer Assistance: Independent  Active : No  Patient's  Info: Spouse drives  Mode of Transportation: Car    OBJECTIVE:     Alejandro Abraham / Faraz Griffith / Marsha Chafe: IV and Telemetry     RESTRICTIONS/PRECAUTIONS:       PAIN: VITALS / O2:   Pre Treatment:          Post Treatment: None       Vitals         Oxygen            GROSS EVALUATION: INTACT IMPAIRED   (See Comments)   UE AROM [x] []   UE PROM [] []N/T   Strength [] LUE Strength  Gross LUE Strength: WFL  LUE Strength Comment: 3/5  RUE Strength  Gross RUE Strength: WFL  RUE Strength Comment: 3/5     Posture / Balance [] Sitting - Static: Good  Sitting - Dynamic: Fair, +  Standing - Static: Fair  Standing - Dynamic: Fair   Sensation []  WFL   Coordination []  Tremors noted in RUE/RLE, pt family and pt report this has been present for a while     Tone []  N/A     Edema [] N/A   Activity Tolerance [] Patient limited by fatigue     Hand Dominance R [x] L []      COGNITION/  PERCEPTION: INTACT IMPAIRED   (See Comments)   Orientation []  A/O x 2   Vision []  WFL   Hearing []  Minimally [place]   Cognition  []  WLF, possible fluctuations in cognition, possible increased anxiety regarding MRI   Perception []       MOBILITY: I Mod I S SBA CGA Min Mod Max Total  NT x2 Comments:   Bed Mobility    Rolling [] [] [] [] [] [x] [] [] [] [] []    Supine to Sit [] [] [] [] [] [x] [] [] [] [] []    Scooting [] [] [] [] [] [x] [] [] [] [] []    Sit to Supine [] [] [] [] [] [x] [] [] [] [] []    Transfers    Sit to Stand [] [] [] [] [] [x] [] [] [] [] [x]    Bed to Chair [] [] [] [] [] [] [] [] [] [x] []    Stand to Sit [] [] [] [] [] [x] [] [] [] [] [x]    Tub/Shower [] [] [] [] [] [] [] [] [] [x] []     Toilet [] [] [] [] [] [] [] [] [] [x] []      [] [] [] [] [] [] [] [] [] [] []    I=Independent, Mod I=Modified Independent, S=Supervision/Setup, SBA=Standby Assistance, CGA=Contact Guard Assistance, Min=Minimal Assistance, Mod=Moderate Assistance, Max=Maximal Assistance, Total=Total Assistance, NT=Not Tested    ACTIVITIES OF DAILY LIVING: I Mod I S SBA CGA Min Mod Max Total NT Comments   BASIC ADLs:              Upper Body Bathing  [] [] [] [x] [x] [] [] [] [] [] BUE washing seated EOB   Lower Body Bathing [] [] [] [] [] [] [x] [] [] [] Toileting [] [] [] [] [] [] [x] [] [] []    Upper Body Dressing [] [] [] [] [x] [] [] [] [] []    Lower Body Dressing [] [] [] [] [] [] [] [x] [] [] Doff/don socks EOB   Feeding [] [x] [] [] [] [] [] [] [] []    Grooming [] [] [] [] [x] [] [] [] [] [] Face washing seated EOB   Personal Device Care [] [] [] [] [] [] [] [] [] [x]    Functional Mobility [] [] [] [] [] [] [x] [] [] [] Bed mobility and side stepping EOB increased support at sub axiallary region vs RW   I=Independent, Mod I=Modified Independent, S=Supervision/Setup, SBA=Standby Assistance, CGA=Contact Guard Assistance, Min=Minimal Assistance, Mod=Moderate Assistance, Max=Maximal Assistance, Total=Total Assistance, NT=Not Tested    PLAN:   FREQUENCY/DURATION   OT Plan of Care: 3 times/week for duration of hospital stay or until stated goals are met, whichever comes first.    PROBLEM LIST:   (Skilled intervention is medically necessary to address:)  Decreased ADL/Functional Activities  Decreased Activity Tolerance  Decreased AROM/PROM  Decreased Balance  Decreased Cognition  Decreased Coordination  Decreased Gait Ability  Decreased Safety Awareness  Decreased Strength  Decreased Transfer Abilities   INTERVENTIONS PLANNED:  (Benefits and precautions of occupational therapy have been discussed with the patient.)  Self Care Training  Therapeutic Activity  Therapeutic Exercise/HEP  Neuromuscular Re-education  Manual Therapy  Education         TREATMENT:     EVALUATION: MODERATE COMPLEXITY: (Untimed Charge)    TREATMENT:   Co-Treatment PT/OT necessary due to patient's decreased overall endurance/tolerance levels, as well as need for high level skilled assistance to complete functional transfers/mobility and functional tasks  Neuromuscular Re-education (15 Minutes): Patient participated in neuromuscular re-education including functional reaching, weight shifting, postural training, standing tolerance activity , and sitting balance activity   with moderate verbal cues and tactile cues to improve sitting balance, standing balance, posture, and coordination in order to prepare for functional task, prepare for seated ADLs, prepare for standing ADLs, and prepare for functional transfer. Self Care (8 minutes): Patient participated in upper body bathing, lower body dressing, and grooming ADLs in supported sitting and unsupported sitting with moderate verbal cueing to increase independence, decrease assistance required, and increase activity tolerance. Patient also participated in functional mobility, functional transfer, energy conservation, and adaptive equipment training to increase independence, decrease assistance required, and increase activity tolerance. TREATMENT GRID:  N/A    AFTER TREATMENT PRECAUTIONS: Alarm Activated, Bed, Bed/Chair Locked, Call light within reach, Needs within reach, RN notified, and Visitors at bedside    INTERDISCIPLINARY COLLABORATION:  RN/ PCT, PT/ PTA, and OT/ LOPEZ    EDUCATION:  Education Given To: Patient; Family  Education Provided: Role of Therapy;Plan of Care;Energy Conservation; Family Education  Education Method: Verbal  Barriers to Learning: Cognition  Education Outcome: Verbalized understanding;Continued education needed    TOTAL TREATMENT DURATION AND TIME:  Time In: 9375  Time Out: 0901  Minutes: [address], OT

## 2023-02-10 NOTE — ED NOTES
MD aware of patient HTN     [facility], [address] [facility]  02/09/23 9450

## 2023-02-10 NOTE — ACP (ADVANCE CARE PLANNING)
Advance Care Planning     General Advance Care Planning (ACP) Conversation    Date of Conversation: 2/9/2023  Conducted with: Patient with Decision Making Capacity    Healthcare Decision Maker:  No healthcare decision makers have been documented. Click here to complete [address] including selection of the Healthcare Decision Maker Relationship (ie \"Primary\")  Today we documented Decision Maker(s) consistent with Legal Next of Kin hierarchy.     Content/Action Overview:  Has NO ACP documents/care preferences - information provided, considering goals and options  Reviewed DNR/DNI and patient elects Full Code (Attempt Resuscitation)        Length of Voluntary ACP Conversation in minutes:  <16 minutes (Non-Billable)    SHWETA Galvan RN

## 2023-02-10 NOTE — PROGRESS NOTES
TRANSFER - IN REPORT:    Verbal report received from  on Ladi Babin  being received from ED for routine progression of patient care      Report consisted of patient's Situation, Background, Assessment and   Recommendations(SBAR). Information from the following report(s) Nurse Handoff Report was reviewed with the receiving nurse. Opportunity for questions and clarification was provided. Assessment completed upon patient's arrival to unit and care assumed. Dual skin assessment competed with Roland Palacio RN upon pt arrival to the floor. Pt skin is pale and dry. Pt has several scattered bruises and scarring throughout her body. Pt has several large bruises on her lower back from previous falls at home. Pt has a small scabbed over abrasion on the right side of her lower back. . Pts legs have scattered ecchymosis. Heels and sacrum are intact. No other redness, breakdown, or impairments noted.

## 2023-02-10 NOTE — ED NOTES
Called lab twice, spoke with Wendi De Jesus, stated could not see orders on their side. New lab orders placed. Lab called again, verified lab has blood and is running them now.       [facility]  02/09/23 0025

## 2023-02-10 NOTE — CARE COORDINATION
Patient admitted with stroke like symptoms. CT of the head negative for acute changes. MRI in progress. CM met with patient's daughter and granddaughter waiting in patient's room. Patient has an established PCP and is insured with supplement. Patient lives with her spouse in a house and has been independent of ADLs but with recent multiple falls. Patient's spouse recently offered patient a RW to use but prior was amulatory without assistance. No history of home health. Pt completed OP therapy at [facility] after orthopedic surgery. Daughter comments patient wouldn't go to a STR. No other DME. Patient renewed her drivers license but has only driven a few times, usually dependent on her spouse for transportation. PT/OT evaluations pending. CM will follow up with patient and family recommendations. CM offered HC POA through [facility]. Daughter will discuss with patient's spouse. 02/10/23 3338   Service Assessment   History Provided By Child/Family   Primary Caregiver Self   Support Systems Spouse/Significant Other;Children;Family Members   Patient's Healthcare Decision Maker is: Legal Next of [place] [id number]   PCP Verified by CM Yes  ([address]   Last Visit to PCP Within last 3 months   Prior Functional Level Independent in ADLs/IADLs   Current Functional Level Independent in ADLs/IADLs   Can patient return to prior living arrangement Yes   Ability to make needs known: Good   Family able to assist with home care needs: Yes   Would you like for me to discuss the discharge plan with any other family members/significant others, and if so, who? Yes  (Spouse and daughter)   Financial Resources Medicare; Other (Comment)  (Supplement, BCBS)   CM/SW Referral Spiritual  (Considering drafting HCPOA)   Social/Functional History   Lives With Spouse   Type of [place] [facility] [facility] Assistance Independent   Ambulation Assistance Independent   Transfer Assistance Independent   Active  No   Patient's  Info Spouse drives   Mode of Transportation Car   Discharge Planning   Type of Residence House   Living Arrangements Spouse/Significant Other   Current Services Prior To Admission None   Potential Assistance Needed Home Care   DME Ordered? No   Potential Assistance Purchasing Medications No   Patient expects to be discharged to: [address] Discharge   Transition of Care Consult (CM Consult) Discharge Planning   Services [address] Provided? No   Mode of Transport at Discharge   (Car)   Confirm Follow Up Transport Family   Condition of Participation: Discharge Planning   The Plan for Transition of Care is related to the following treatment goals: To be determined after therapy evaluation.

## 2023-02-10 NOTE — PROGRESS NOTES
Comprehensive Nutrition Assessment    Type and Reason for Visit: Initial, Positive Nutrition Screen  Malnutrition Screening Tool: Malnutrition Screen  Have you recently lost weight without trying?: 2 to 13 pounds (1 point)  Have you been eating poorly because of a decreased appetite?: Yes (1 point)  Malnutrition Screening Tool Score: 2    Nutrition Recommendations/Plan:   Meals and Snacks:  Diet: Continue current order - regular per SLP on 2/10  Nutrition Supplement Therapy:  Medical food supplement therapy:  Initiate Ensure Enlive three times per day (this provides 350 kcal and 20 grams protein per bottle)     Malnutrition Assessment:  Malnutrition Status: At risk for malnutrition (Comment) (d/t decreased po intake for the last few weeks (per family), mostly consuming liquids only)  no adrien wasting of fat or muscle stores    Nutrition Assessment:  Nutrition History: Pt unable to discuss nutrition hx with RD at time of visit d/t aphasia. Family ( and daughter) present at bedside provide hx.  states pt has been eating less over the last year, but this has been her new normal. States she eats 2 small meals and one bigger meal (dinner) daily.  reports having Boost on hand at home so that if pt misses a meal, he can have her drink a boost. He states that she has had a more recent decrease in po intake in the last few weeks and now is mostly consuming only liquids.  and daughter report UBW of 150-160 lbs. Daughter states pt had an office weight a few weeks ago of 140 lbs, but RD was unable to confirm via EMR. Unsure of exact extent of weight loss at this time but  states he has also noticed some weight loss.  lbs on admission. Do You Have Any Cultural, Mandaeism, or Ethnic Food Preferences?: No   Nutrition Background:       PMH significant for CVA, GERD, depression, hypercholesterolemia, HTN, and mitral valve regurgitation. Pt presents this admission for aphasia.    Nutrition Interval:   RD met w/pt and family in room. Pt unable to talk with RD due to aphasia. Family states pt has not yet had food since being admitted.  states pt has been better with consuming liquids and is agreeable to pt receiving nutrition supplements during admission to help increase kcal/protein intake. EE TID added. SLP saw pt today (2/10) with recommendations for regular diet. RD will follow to monitor po intake and supplement tolerance throughout admission. Current Nutrition Therapies:  ADULT DIET; Regular    Current Intake:   Average Meal Intake: Unable to assess (no meals yet this admission) Average Supplements Intake: None Ordered      Anthropometric Measures:  Height: 5' 1\" (154.9 cm)  Current Body Wt: 158 lb 1.1 oz (71.7 kg) (2/9), Weight source: Stated  BMI: 29.9, Overweight (BMI 25.0-29. 9)  Admission Body Weight: 158 lb 1.1 oz (71.7 kg)  Ideal Body Weight (Kg) (Calculated): 48 kg (105 lbs), 150.5 %  Usual Body Wt:  (150-160 lbs per family), Percent weight change:         BMI Category Overweight (BMI 25.0-29. 9)    Estimated Daily Nutrient Needs:  Energy (kcal/day): 9717-2928 (18-22 kcal/kg) (Kcal/kg Weight used: 71.7 kg Current  Protein (g/day): 72-86 (1-1.2 g/kg) Weight Used: (Current) 71.7 kg  Fluid (ml/day):   (1 ml/kcal)    Nutrition Diagnosis:   Inadequate oral intake related to  (decreased appetite) as evidenced by poor intake prior to admission (per family, pt mostly only consuming liquids for the last few weeks)    Nutrition Interventions:   Food and/or Nutrient Delivery: Continue Current Diet, Start Oral Nutrition Supplement  Nutrition Education/Counseling: No recommendation at this time  Coordination of Nutrition Care: Continue to monitor while inpatient  Plan of Care discussed with: , daughter    Goals:       Active Goal: PO intake 50% or greater, by next RD assessment       Nutrition Monitoring and Evaluation:      Food/Nutrient Intake Outcomes: Food and Nutrient Intake, Supplement Intake  Physical Signs/Symptoms Outcomes: Weight, Meal Time Behavior    Discharge Planning:    Continue current diet, Continue Oral Nutrition Supplement    Jarrod Berry RD

## 2023-02-10 NOTE — PROGRESS NOTES
SPEECH LANGUAGE PATHOLOGY: DYSPHAGIA  Initial Assessment    NAME: Randee King  : 1940  MRN: [id number]    ADMISSION DATE: 2023  PRIMARY DIAGNOSIS: Stroke-like symptoms  Stroke-like symptoms [R29.90]  Cerebrovascular accident (CVA), unspecified mechanism ([address] [I63.9]    ICD-10: Treatment Diagnosis: R13.11 Dysphagia, Oral Phase    RECOMMENDATIONS   Diet:  Diet Solids Recommendation: Regular  Liquid Consistency Recommendation: Thin    Medications: PO          Therapeutic Interventions: Patient/Family education   Compensatory Swallowing Strategies: Remain upright for 30-45 minutes after meals;Upright as possible for all oral intake;Eat/Feed slowly; Assist feed;Small bites/sips   Patient continues to require skilled intervention: Yes  D/C Recommendations: Ongoing speech therapy is recommended during this hospitalization, To be determined     ASSESSMENT    Dysphagia Diagnosis: Swallow function appears [facility]  Recommend regular diet and thin liquids. Set up assistance and assist with feeding. MRI pending. Recommend SLP follow up for full communication evaluation pending findings. GENERAL    History of Present Injury/Illness: Ms. Nils Woods  has a past medical history of Carotid stenosis, Cerebral vascular accident [facility]), CVA (cerebrovascular accident) ([address] Depression, GERD (gastroesophageal reflux disease), Hypercholesterolemia, Hypertension, Mitral valve regurgitation, Paroxysmal atrial fibrillation ([address] Pulmonary hypertension ([address] SSS (sick sinus syndrome) ([address] and Stroke ([address] . She also  has a past surgical history that includes gyn; orthopedic surgery (); and Colonoscopy (). General Comment  Comments: reports history of aphasia after prior CVA but functional then new onset yesterday. daughter mentioned worse over last month. MRI pending    Subjective: upright in bed with daughter present.   Communication Observation: Functional (some word finding difficulty)  Follows Directions: Simple    Prior Dysphagia History: none     Current Diet : NPO  Current Liquid Diet : NPO          O2 Device: Nasal cannula  Liters of Oxygen: 6 L     Pain:   Patient c/o pain (RN aware)                                        OBJECTIVE        Oral Motor   Labial: No impairment  Oral Hygiene Comments: dry  Lingual: No impairment  Dentition: Adequate             Oropharyngeal Phase:      Patient consumed thin liquid via cup and straw, puree, mixed, and solid consistencies. Mildly slowed but functional oral prep and clearance with all textures. 1-2 swallows upon palpation likely indicating adequate pharyngeal clearance. No overt signs or symptoms of airway compromise observed with liquid or solid textures. Voice remained clear. PLAN    Duration/Frequency: SLP will follow up for communication evaluation if indicated by imaging or course of hospitalization. Frequency/Duration TBD. Dysphagia Outcome and Severity Scale (PERRI)  Dysphagia Outcome Severity Scale: Level 6: Within functional limits/Modified independence  Interpretation of Tool: The Dysphagia Outcome and Severity Scale (PERRI) is a simple, easy-to-use, 7-point scale developed to systematically rate the functional severity of dysphagia based on objective assessment and make recommendations for diet level, independence level, and type of nutrition. Normal(7), Functional(6), Mild(5), Mild-Moderate(4), Moderate(3), Moderate-Severe(2), Severe(1)    Speech Therapy Prognosis  Prognosis: Good    Education: Patient, RN, Family member  Patient Education: diet consistencies, aspiration precautions, role of SLP       PRECAUTIONS/ALLERGIES: Patient has no known allergies. Safety Devices in place: Yes  Type of devices: Call light within reach; Left in bed;Nurse notified        Therapy Time  SLP Individual Minutes  Time In: 0920  Time Out: 0930  Minutes: [address] [facility]  2/10/2023 10:07 AM

## 2023-02-10 NOTE — DISCHARGE INSTRUCTIONS

## 2023-02-10 NOTE — ED PROVIDER NOTES
Emergency Department Provider Note                   PCP:                KACY Franco NP               Age: 81 y.o. Sex: female       ICD-10-CM    1. Cerebrovascular accident (CVA), unspecified mechanism ([facility] [address]  I63.9           DISPOSITION Decision To Admit 02/09/2023 11:53:39 PM        Medical Decision Making  Patient arrived with acute onset aphasia. We did discover that the patient had a history of stroke that had hemorrhagic conversion and is not a tPA candidate. CT and CT brain were obtained radiologist called me that he found a small aneurysm with no active bleeding. No significant large vessel occlusion. He did see carotid stenosis 70 to 90%. Patient initially was hypertensive on arrival and will within the tPA window prior to knowledge of her not being a candidate received labetalol for systolic of 296. Patient's CBC and CHEM did not reveal significantly acutely abnormal findings. While the patient was receiving work-up she did have some low oxygen episodes and I did do a chest x-ray that showed mild pulmonary edema. Patient is currently on supplemental oxygen and appears nontoxic. Patient was admitted to the hospitalist team.    Amount and/or Complexity of Data Reviewed  Labs: ordered. Radiology: ordered. ECG/medicine tests: ordered. Risk  Prescription drug management.                                 Orders Placed This Encounter   Procedures    CT HEAD WO CONTRAST    CTA HEAD NECK W CONTRAST    XR CHEST PORTABLE    Urinalysis w rflx microscopic    CBC    Comprehensive Metabolic Panel    Protime-INR    Troponin    Diet NPO    NIHSS    Nursing swallow assessment    POCT Glucose    POCT Glucose    POCT INR    EKG 12 Lead        Medications   0.9 % sodium chloride bolus (has no administration in time range)   sodium chloride flush 0.9 % injection 10 mL (has no administration in time range)   iohexol (OMNIPAQUE 350) solution 100 mL (100 mLs IntraVENous Given 2/9/23 2200) labetalol (NORMODYNE;TRANDATE) injection 10 mg (10 mg IntraVENous Given 2/9/23 7988)       New Prescriptions    No medications on file        Ceasar Morales is a 81 y.o. female who presents to the Emergency Department with chief complaint of    Chief Complaint   Patient presents with    Cerebrovascular Accident      71-year-old female with a past medical history of stroke on blood thinners presents with acute onset aphasia started 2 hours prior to ER arrival.  No focal deficits. EMS reported the patient was drinking water when all of a sudden she could not get the water to her mouth and spilled the water and then family noted she had difficulty speaking. Patient arrived with EMS and no family initially. They deny any focal neurodeficit. Patient is slightly hypertensive. Code stroke was activated. Denies any chest pain, shortness of breath, focal neurodeficit however I do question some of this to confusion        Review of Systems   HENT: Negative. Eyes: Negative. Respiratory: Negative. Cardiovascular: Negative. Gastrointestinal: Negative. Genitourinary: Negative. Musculoskeletal: Negative. Skin: Negative. Neurological:  Positive for speech difficulty. Psychiatric/Behavioral: Negative.        Past Medical History:   Diagnosis Date    Carotid stenosis     With infarct    Cerebral vascular accident [facility]) 07/2015    CVA (cerebrovascular accident) ([address]     Depression     GERD (gastroesophageal reflux disease)     Hypercholesterolemia     Hypertension     Mitral valve regurgitation     Paroxysmal atrial fibrillation ([address]     Pulmonary hypertension (HCC)     SSS (sick sinus syndrome) ([address]     Tachycardia-bradycardia    Stroke ([address] 2015        Past Surgical History:   Procedure Laterality Date    COLONOSCOPY  2011    GYN      sharonda--years ago    ORTHOPEDIC SURGERY  2013    left total knee        Family History   Problem Relation Age of Onset    Stroke Mother     Heart Disease Father Social History     Socioeconomic History    Marital status:    Tobacco Use    Smoking status: Never    Smokeless tobacco: Never   Vaping Use    Vaping Use: Never used   Substance and Sexual Activity    Alcohol use: No     Alcohol/week: 0.0 standard drinks    Drug use: No    Sexual activity: Not Currently         Patient has no known allergies. Previous Medications    AMLODIPINE (NORVASC) 5 MG TABLET    Take 5 mg by mouth daily    ASCORBIC ACID (VITAMIN C) 250 MG TABLET    Take by mouth    ASPIRIN 81 MG EC TABLET    Take by mouth daily    ATORVASTATIN (LIPITOR) 40 MG TABLET    Take 40 mg by mouth daily    CITALOPRAM (CELEXA) 20 MG TABLET    Take 1 tablet by mouth daily    LISINOPRIL (PRINIVIL;ZESTRIL) 20 MG TABLET    Take 20 mg by mouth 2 times daily    MIRTAZAPINE (REMERON) 7.5 MG TABLET    Take 1 tablet by mouth nightly    OMEPRAZOLE (PRILOSEC) 20 MG DELAYED RELEASE CAPSULE    Take 1 capsule by mouth daily    RIVAROXABAN (XARELTO) 20 MG TABS TABLET    Take 20 mg by mouth Daily with supper    SPIRONOLACTONE (ALDACTONE) 25 MG TABLET    Take 25 mg by mouth daily        Vitals signs and nursing note reviewed. Patient Vitals for the past 4 hrs:   Temp Pulse Resp BP SpO2   02/09/23 2321 -- 86 14 (!) 187/72 94 %   02/09/23 2305 -- 77 13 (!) 184/123 96 %   02/09/23 2300 -- 79 22 (!) 165/113 96 %   02/09/23 2245 -- 87 17 (!) 214/67 94 %   02/09/23 2213 97.7 °F (36.5 °C) -- -- -- --   02/09/23 2209 -- (!) 104 18 -- 92 %   02/09/23 2204 -- (!) 101 13 (!) 214/77 96 %          Physical Exam  Constitutional:       General: She is not in acute distress. Appearance: Normal appearance. She is not ill-appearing. HENT:      Head: Normocephalic and atraumatic. Nose: No rhinorrhea. Mouth/Throat:      Mouth: Mucous membranes are moist.   Eyes:      Extraocular Movements: Extraocular movements intact. Cardiovascular:      Rate and Rhythm: Normal rate and regular rhythm.    Pulmonary:      Effort: Pulmonary effort is normal.      Breath sounds: Normal breath sounds. Abdominal:      General: Abdomen is flat. Bowel sounds are normal. There is no distension. Palpations: Abdomen is soft. Tenderness: There is no abdominal tenderness. Musculoskeletal:         General: Normal range of motion. Cervical back: Normal range of motion. Skin:     General: Skin is warm and dry. Neurological:      General: No focal deficit present. Mental Status: She is alert. Comments: Patient does have some aphasia however no focal deficits otherwise are noted. Cranial nerves II through XII are grossly intact. Patient is able to move all 4 extremities with 5 out of 5 strength. Denies any loss of sensation to light touch in any extremity. Psychiatric:         Mood and Affect: Mood normal.        Procedures    Results for orders placed or performed during the hospital encounter of 02/09/23   CT HEAD WO CONTRAST    Narrative    EXAMINATION: CT HEAD WO CONTRAST, CTA HEAD NECK W CONTRAST    DATE: 2/9/2023 9:30 PM     INDICATION: Weakness, aphasia, unknown last normal     COMPARISON: CTA neck from 1/8/2018. TECHNIQUE: Thin section noncontrast axial images were obtained through the head. CT angiography through the head and neck was performed in the axial plane using   100 mL of Omnipaque 350 administered intravenously, without adverse reaction. Coronal and sagittal MIP and MPR images were generated. CT dose lowering   techniques were used, to include: automated exposure control, adjustment for   patient size, and or use of iterative reconstruction. FINDINGS:    CT Head: Intracranial contents:    No intracranial hemorrhage, evidence of acute territorial infarct, mass, mass   effect or hydrocephalus. Hypodensity in the right lentiform nuclei and corona   radiata most likely represents a chronic lacunar infarct. Extensive chronic   small vessel ischemic changes noted throughout the white matter. Extensive   intracranial atherosclerosis. Moderate global cerebral volume loss. Bones and extracranial soft tissues:     No fracture or focal osseous lesion in the calvarium or skull base. Bilateral   cataract surgery, otherwise orbits are unremarkable. Mucosal thickening in the   bilateral maxillary and sphenoid sinuses. Mastoid air cells and middle ear   cavities are clear bilaterally. CTA of the neck:    Aorta  Bovine configuration of the arch, normal variant. Moderate atherosclerotic   stenosis at the origins of the bilateral subclavian arteries is also made of   moderate stenosis in the left axillary artery. Right Carotid  Extensive atherosclerotic plaque in the distal right common carotid artery,   carotid bifurcation and ICA bulb with severe stenosis of the distal common   carotid artery and bifurcation and critical stenosis of the proximal ICA, 90% by   NASCET criteria. External carotid is also severely narrowed. Left Carotid  The common carotid artery is widely patent. Extensive calcified plaque in the   ICA origin and ICA bulb with approximately 70% stenosis by NASCET criteria. Mild   atherosclerosis stenosis of the proximal external carotid artery. Vertebral arteries  The vertebral arteries are codominant. No evidence of occlusion, stenosis, or   dissection. Osseous and soft tissue structures:    No soft tissue abnormality visible in the neck. Trace interstitial edema in the   lung apices. No acute osseous lesions in the neck. Degenerative changes in the   cervical spine. CTA of the head:    Anterior circulation  Atherosclerotic plaque throughout the bilateral carotid siphons without   hemodynamically significant stenosis. The middle and anterior cerebral arteries   are widely patent and unremarkable bilaterally. Incidental note of a 2 mm   broad-based aneurysm or infundibulum directed inferiorly from the left ICA   terminus on image 378 of series 301 and image 70 of series 602. Anterior   cerebral arteries are widely patent. There is no A1 segment on the right side,   both anterior cerebral arteries originate from the left ICA, normal variant. Posterior circulation  The vertebrobasilar system, superior cerebellar arteries, and posterior cerebral   arteries are normal in caliber, without occlusion, significant stenosis, or   aneurysmal dilation. Dural venous sinuses are patent where visualized. No abnormal arterial   enhancement in the brain. Impression    1. No acute intracranial abnormality visible by CT. MRI has greater sensitivity   for acute infarct. 2. Extensive chronic small vessel ischemic changes throughout the white matter   and possible chronic lacunar infarct in the right basal ganglia/internal   capsule. 3. No intracranial arterial occlusion or hemodynamically significant stenosis. 4. 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 5. Severe/critical atherosclerotic stenosis of the bilateral internal carotid   arteries in the neck, measuring 90% on the right and 70% on the left by NASCET   criteria. 6. Vertebral arteries are widely patent bilaterally. 7. Moderate atherosclerotic stenosis at the origins of both subclavian arteries. 8. Trace interstitial edema in the lung apices. 9. Mild inflammation in the paranasal sinuses. Discussed with Dr. Vibha Oliveira at 16 p.m. on 2/9/2023        This examination was interpreted by Evy Hart M.D. Evy Hart M.D.   2/9/2023 10:20:00 PM   CTA HEAD NECK W CONTRAST    Narrative    EXAMINATION: CT HEAD WO CONTRAST, CTA HEAD NECK W CONTRAST    DATE: 2/9/2023 9:30 PM     INDICATION: Weakness, aphasia, unknown last normal     COMPARISON: CTA neck from 1/8/2018. TECHNIQUE: Thin section noncontrast axial images were obtained through the head.    CT angiography through the head and neck was performed in the axial plane using   100 mL of Omnipaque 350 administered intravenously, without adverse reaction. Coronal and sagittal MIP and MPR images were generated. CT dose lowering   techniques were used, to include: automated exposure control, adjustment for   patient size, and or use of iterative reconstruction. FINDINGS:    CT Head: Intracranial contents:    No intracranial hemorrhage, evidence of acute territorial infarct, mass, mass   effect or hydrocephalus. Hypodensity in the right lentiform nuclei and corona   radiata most likely represents a chronic lacunar infarct. Extensive chronic   small vessel ischemic changes noted throughout the white matter. Extensive   intracranial atherosclerosis. Moderate global cerebral volume loss. Bones and extracranial soft tissues:     No fracture or focal osseous lesion in the calvarium or skull base. Bilateral   cataract surgery, otherwise orbits are unremarkable. Mucosal thickening in the   bilateral maxillary and sphenoid sinuses. Mastoid air cells and middle ear   cavities are clear bilaterally. CTA of the neck:    Aorta  Bovine configuration of the arch, normal variant. Moderate atherosclerotic   stenosis at the origins of the bilateral subclavian arteries is also made of   moderate stenosis in the left axillary artery. Right Carotid  Extensive atherosclerotic plaque in the distal right common carotid artery,   carotid bifurcation and ICA bulb with severe stenosis of the distal common   carotid artery and bifurcation and critical stenosis of the proximal ICA, 90% by   NASCET criteria. External carotid is also severely narrowed. Left Carotid  The common carotid artery is widely patent. Extensive calcified plaque in the   ICA origin and ICA bulb with approximately 70% stenosis by NASCET criteria. Mild   atherosclerosis stenosis of the proximal external carotid artery. Vertebral arteries  The vertebral arteries are codominant.  No evidence of occlusion, stenosis, or dissection. Osseous and soft tissue structures:    No soft tissue abnormality visible in the neck. Trace interstitial edema in the   lung apices. No acute osseous lesions in the neck. Degenerative changes in the   cervical spine. CTA of the head:    Anterior circulation  Atherosclerotic plaque throughout the bilateral carotid siphons without   hemodynamically significant stenosis. The middle and anterior cerebral arteries   are widely patent and unremarkable bilaterally. Incidental note of a 2 mm   broad-based aneurysm or infundibulum directed inferiorly from the left ICA   terminus on image 378 of series 301 and image 70 of series 602. Anterior   cerebral arteries are widely patent. There is no A1 segment on the right side,   both anterior cerebral arteries originate from the left ICA, normal variant. Posterior circulation  The vertebrobasilar system, superior cerebellar arteries, and posterior cerebral   arteries are normal in caliber, without occlusion, significant stenosis, or   aneurysmal dilation. Dural venous sinuses are patent where visualized. No abnormal arterial   enhancement in the brain. Impression    1. No acute intracranial abnormality visible by CT. MRI has greater sensitivity   for acute infarct. 2. Extensive chronic small vessel ischemic changes throughout the white matter   and possible chronic lacunar infarct in the right basal ganglia/internal   capsule. 3. No intracranial arterial occlusion or hemodynamically significant stenosis. 4. 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 5. Severe/critical atherosclerotic stenosis of the bilateral internal carotid   arteries in the neck, measuring 90% on the right and 70% on the left by NASCET   criteria. 6. Vertebral arteries are widely patent bilaterally. 7. Moderate atherosclerotic stenosis at the origins of both subclavian arteries.   8. Trace interstitial edema in the lung apices. 9. Mild inflammation in the paranasal sinuses. Discussed with Dr. Rosalio Geller at 16 p.m. on 2/9/2023        This examination was interpreted by MERI Prakash M.D.   2/9/2023 10:20:00 PM   XR CHEST PORTABLE    Narrative    EXAMINATION: XR CHEST PORTABLE      DATE OF EXAM: 2/9/2023 10:45 PM  SLOT: 60    HISTORY: hypoxia     COMPARISON: None. FINDINGS:    HEART/MEDIASTINUM: Enlarged cardiac silhouette. LUNGS/PLEURA: Bilateral interstitial prominence and pulmonary vascular   engorgement, likely on the basis of mild pulmonary edema. MUSCULOSKELETAL: No acute osseous pathology. Impression    1. Cardiomegaly with mild pulmonary edema.           Dino Prince M.D.   2/9/2023 10:56:00 PM   Urinalysis w rflx microscopic   Result Value Ref Range    Color, UA YELLOW/STRAW      Appearance CLEAR      Specific Gravity, UA 1.030 (H) 1.001 - 1.023      pH, Urine 7.5 5.0 - 9.0      Protein, UA 30 (A) NEG mg/dL    Glucose, UA Negative mg/dL    Ketones, Urine Negative NEG mg/dL    Bilirubin Urine Negative NEG      Blood, Urine Negative NEG      Urobilinogen, Urine 1.0 0.2 - 1.0 EU/dL    Nitrite, Urine Negative NEG      Leukocyte Esterase, Urine Negative NEG      WBC, UA 0-4 U4 /hpf    RBC, UA 0-5 U5 /hpf    Epithelial Cells UA 0-5 U5 /hpf    BACTERIA, URINE Negative NEG /hpf    Casts 0-2 U2 /lpf   CBC   Result Value Ref Range    WBC 8.9 4.3 - 11.1 K/uL    RBC 4.32 4.05 - 5.2 M/uL    Hemoglobin 13.7 11.7 - 15.4 g/dL    Hematocrit 40.1 35.8 - 46.3 %    MCV 92.8 82 - 102 FL    MCH 31.7 26.1 - 32.9 PG    MCHC 34.2 31.4 - 35.0 g/dL    RDW 14.4 11.9 - 14.6 %    Platelets 303 762 - 720 K/uL    MPV 9.6 9.4 - 12.3 FL    nRBC 0.00 0.0 - 0.2 K/uL   Comprehensive Metabolic Panel   Result Value Ref Range    Sodium PENDING mmol/L    Potassium PENDING mmol/L    Chloride PENDING mmol/L    CO2 PENDING mmol/L    Anion Gap PENDING mmol/L    Glucose PENDING mg/dL BUN PENDING MG/DL    Creatinine PENDING MG/DL    Est, Glom Filt Rate PENDING ml/min/1.73m2    Calcium PENDING MG/DL    Total Bilirubin PENDING MG/DL    ALT PENDING U/L    AST PENDING U/L    Alk Phosphatase PENDING U/L    Total Protein PENDING g/dL    Albumin PENDING g/dL    Globulin PENDING g/dL    Albumin/Globulin Ratio PENDING     Protime-INR   Result Value Ref Range    Protime 18.5 (H) 12.6 - 14.3 sec    INR 1.5     Troponin   Result Value Ref Range    Troponin, High Sensitivity 10.9 0 - 14 pg/mL   POCT Glucose   Result Value Ref Range    POC Glucose 124 (H) 65 - 100 mg/dL    Performed by: Tanya    POCT INR   Result Value Ref Range    POC Protime 14.0 (H) 9.6 - 11.6 SECS    POC INR 1.2 0.9 - 1.2     EKG 12 Lead   Result Value Ref Range    Ventricular Rate 89 BPM    Atrial Rate 169 BPM    QRS Duration 76 ms    Q-T Interval 463 ms    QTc Calculation (Bazett) 564 ms    R Axis 89 degrees    T Axis 49 degrees    Diagnosis Atrial fibrillation         XR CHEST PORTABLE   Final Result      1. Cardiomegaly with mild pulmonary edema. Quinn Mc M.D.    2/9/2023 10:56:00 PM      CT HEAD WO CONTRAST   Final Result      1. No acute intracranial abnormality visible by CT. MRI has greater sensitivity    for acute infarct. 2. Extensive chronic small vessel ischemic changes throughout the white matter    and possible chronic lacunar infarct in the right basal ganglia/internal    capsule. 3. No intracranial arterial occlusion or hemodynamically significant stenosis. 4. 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 5. Severe/critical atherosclerotic stenosis of the bilateral internal carotid    arteries in the neck, measuring 90% on the right and 70% on the left by NASCET    criteria. 6. Vertebral arteries are widely patent bilaterally. 7. Moderate atherosclerotic stenosis at the origins of both subclavian arteries. 8. Trace interstitial edema in the lung apices. 9. Mild inflammation in the paranasal sinuses. Discussed with  KAN [facility] at 16 p.m. on 2/9/2023            This examination was interpreted by MERI Hayes M.D.    2/9/2023 10:20:00 PM      CTA HEAD NECK W CONTRAST   Final Result      1. No acute intracranial abnormality visible by CT. MRI has greater sensitivity    for acute infarct. 2. Extensive chronic small vessel ischemic changes throughout the white matter    and possible chronic lacunar infarct in the right basal ganglia/internal    capsule. 3. No intracranial arterial occlusion or hemodynamically significant stenosis. 4. 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 5. Severe/critical atherosclerotic stenosis of the bilateral internal carotid    arteries in the neck, measuring 90% on the right and 70% on the left by NASCET    criteria. 6. Vertebral arteries are widely patent bilaterally. 7. Moderate atherosclerotic stenosis at the origins of both subclavian arteries. 8. Trace interstitial edema in the lung apices. 9. Mild inflammation in the paranasal sinuses. Discussed with Dr. GAY [facility] at 16 p.m. on 2/9/2023            This examination was interpreted by MERI Hayes M.D.    2/9/2023 10:20:00 PM           NIH Stroke Scale  Interval: Reassessment  Level of Consciousness (1a): Alert  LOC Questions (1b): Answers both correctly  LOC Commands (1c): Performs both tasks correctly  Best Gaze (2): Normal  Visual (3): No visual loss  Facial Palsy (4): Normal symmetrical movement  Motor Arm, Left (5a): No drift  Motor Arm, Right (5b): Drift, but does not hit bed  Motor Leg, Left (6a): No drift  Motor Leg, Right (6b):  No drift  Limb Ataxia (7): Absent  Sensory (8): Normal  Best Language (9): Mild to moderate aphasia  Dysarthria (10): Mild to moderate, slurs some words  Extinction and Inattention (11): No abnormality  Total: 3              Voice dictation software was used during the making of this note. This software is not perfect and grammatical and other typographical errors may be present. This note has not been completely proofread for errors.      Annel Owusu MD  02/09/23 [id number]

## 2023-02-10 NOTE — PROGRESS NOTES
ACUTE PHYSICAL THERAPY GOALS:   (Developed with and agreed upon by patient and/or caregiver. )    LTG:  (1.)Ms. Gresham will move from supine to sit and sit to supine , scoot up and down, and roll side to side in bed with CONTACT GUARD ASSIST within 7 treatment day(s). (2.)Ms. Gresham will transfer from bed to chair and chair to bed with CONTACT GUARD ASSIST using the least restrictive device within 7 treatment day(s). (3.)Ms. Gresham will ambulate with CONTACT GUARD ASSIST for 50 feet with the least restrictive device within 7 treatment day(s). (4.)Ms. Gresham will tolerate at least 23 min of dynamic standing activity to assist standing ADLs with the least restrictive device within 7 treatment days. ________________________________________________________________________________________________      PHYSICAL THERAPY Initial Assessment, Daily Note, and AM  (Link to Caseload Tracking: PT Visit Days : 1  Acknowledge Orders  Time In/Out  PT Charge Capture  Rehab Caseload Tracker    Doyle Gifford is a 81 y.o. female   PRIMARY DIAGNOSIS: Stroke-like symptoms  Stroke-like symptoms [R29.90]  Cerebrovascular accident (CVA), unspecified mechanism ([facility] [address] [I63.9]       Reason for Referral: Generalized Muscle Weakness (M62.81)  Other lack of cordination (R27.8)  Difficulty in walking, Not elsewhere classified (R26.2)  Other abnormalities of gait and mobility (R26.89)  Inpatient: Payor: MEDICARE / Plan: MEDICARE PART A AND B / Product Type: *No Product type* /     ASSESSMENT:     REHAB RECOMMENDATIONS:   Recommendation to date pending progress:  Setting:  Inpatient Rehab Facility    Equipment:    To Be Determined     ASSESSMENT:  Ms. Claudette Hoehn presents in supine, agreeable to session. She requires 1 step commands, and has a chronic R sided tremor. Sitting BP EOB is 157/96, standing BP is 115/90. Upon entering, Pnt is agreeable to PT treatment.   she reports no pain at rest. Pnt performed supine > sit with min Ax2, sitting EOB with fair sitting balance control. Sit > stand with min Ax2 using RW. She takes step in place , then side steps alongside the bed. Gait is noted to be shuffled. She is noted to be orthostatic. Stand > sit with min Ax2, followed by positioning for comfort. At end of session pt supine in bed with all needs within reach, alarm activated for safety, RN notified. Overall, she presents as functioning below her baseline, with deficits in mobility including transfers, gait, balance, and activity tolerance. Pt will continue to benefit from skilled therapy services to address stated deficits to promote return to highest level of function, independence, and safety. Will continue to follow.        [facility] [address] [address] AM-PAC 6 Clicks Basic Mobility Inpatient Short Form  AM-PAC Basic Mobility - Inpatient   How much help is needed turning from your back to your side while in a flat bed without using bedrails?: A Little  How much help is needed moving from lying on your back to sitting on the side of a flat bed without using bedrails?: A Little  How much help is needed moving to and from a bed to a chair?: A Little  How much help is needed standing up from a chair using your arms?: A Little  How much help is needed walking in hospital room?: A Lot  How much help is needed climbing 3-5 steps with a railing?: A Lot  AM-PAC Inpatient Mobility Raw Score : 16  AM-PAC Inpatient T-Scale Score : 40.78  Mobility Inpatient CMS 0-100% Score: 54.16  Mobility Inpatient CMS G-Code Modifier : CK    SUBJECTIVE:   Ms. Elina Ashley states, \"I got the H taken off my name because I was just being a teenager\"     Social/Functional   lives w/  in 1 story home    OBJECTIVE:     PAIN: VITALS / O2: PRECAUTION / Giovany Caroli / DRAINS:   Pre Treatment: 0         Post Treatment: 0 Vitals        Oxygen      IV    RESTRICTIONS/PRECAUTIONS:                     GROSS EVALUATION: Intact Impaired (Comments):   AROM []   Decreased global extension PROM []    Strength []  Generalized weakness   Balance []  Trunk sway with ambulation   Posture [] N/A   Sensation [x]     Coordination []   Irregular step length   Tone [x]     Edema [x]    Activity Tolerance []   Fatigues quickly in standing    []      COGNITION/  PERCEPTION: Intact Impaired (Comments):   Orientation []  X2-3   Vision [x]     Hearing [x]     Cognition  []  One step commands only     MOBILITY: I Mod I S SBA CGA Min Mod Max Total  NT x2 Comments:   Bed Mobility    Rolling [] [] [] [] [] [x] [] [] [] [] [x]    Supine to Sit [] [] [] [] [] [x] [] [] [] [] [x]    Scooting [] [] [] [] [] [x] [] [] [] [] [x]    Sit to Supine [] [] [] [] [] [x] [] [] [] [] [x]    Transfers    Sit to Stand [] [] [] [] [] [x] [] [] [] [] [x]    Bed to Chair [] [] [] [] [] [] [] [] [] [x] []    Stand to Sit [] [] [] [] [] [x] [] [] [] [] [x]     [] [] [] [] [] [] [] [] [] [] []    I=Independent, Mod I=Modified Independent, S=Supervision, SBA=Standby Assistance, CGA=Contact Guard Assistance,   Min=Minimal Assistance, Mod=Moderate Assistance, Max=Maximal Assistance, Total=Total Assistance, NT=Not Tested    GAIT: I Mod I S SBA CGA Min Mod Max Total  NT x2 Comments:   Level of Assistance [] [] [] [] [] [x] [] [] [] [] [x]    Distance    Steps alongside bed    DME None    Gait Quality Decreased selma , Decreased step clearance, Decreased step length, and Decreased stance    Weightbearing Status      Stairs      I=Independent, Mod I=Modified Independent, S=Supervision, SBA=Standby Assistance, CGA=Contact Guard Assistance,   Min=Minimal Assistance, Mod=Moderate Assistance, Max=Maximal Assistance, Total=Total Assistance, NT=Not Tested    PLAN:   FREQUENCY AND DURATION: 3 times/week for duration of hospital stay or until stated goals are met, whichever comes first.    THERAPY PROGNOSIS: Excellent    PROBLEM LIST:   (Skilled intervention is medically necessary to address:)  Decreased ADL/Functional Activities  Decreased Activity Tolerance  Decreased AROM/PROM  Decreased Balance  Decreased Cognition  Decreased Coordination  Decreased Gait Ability  Decreased Safety Awareness  Decreased Strength  Decreased Transfer Abilities INTERVENTIONS PLANNED:   (Benefits and precautions of physical therapy have been discussed with the patient.)  Self Care Training  Therapeutic Activity  Therapeutic Exercise/HEP  Neuromuscular Re-education  Gait Training  Manual Therapy  Modalities  Education       TREATMENT:   EVALUATION: MODERATE COMPLEXITY: (Untimed Charge)    TREATMENT:   Co-Treatment PT/OT necessary due to patient's decreased overall endurance/tolerance levels, as well as need for high level skilled assistance to complete functional transfers/mobility and functional tasks  Therapeutic Activity (16 Minutes): Therapeutic activity included Lateral Scooting, Ambulation on level ground, Sitting balance , and Standing balance to improve functional Activity tolerance, Balance, Coordination, Mobility, Strength, and ROM. TREATMENT GRID:  N/A    AFTER TREATMENT PRECAUTIONS: Chair, Needs within reach, and RN notified    INTERDISCIPLINARY COLLABORATION:  RN/ PCT, PT/ PTA, and OT/ LOPEZ    EDUCATION: Education Given To: Patient; Family  Education Provided: Role of Therapy;Plan of Care    TIME IN/OUT:  Time In: 5575  Time Out: Heide  Minutes: [address], PT

## 2023-02-10 NOTE — H&P
Hospitalist History and Physical   Admit Date:  2023  9:26 PM   Name:  Elizabeth Gresham   Age:  82 y.o.  Sex:  female  :  1940   MRN:  [id number]   Room:  [place] [address]    Presenting Complaint: Cerebrovascular Accident     Reason(s) for Admission: Stroke-like symptoms [R29.90]     History of Present Illness:   Elizabeth Gresham is a 82 y.o. female with medical history of AFIB on xarelto, SSS, prior CVA s/p tpa with hemorrhagic converstion in , BL COSME, GERD who presented from home via EMS with acute onset aphasia that started 2 hours PTA. She was drinking water and it spilled out of her mouth and then family noticied she had difficulty speaking. Noted to have some confusion while en route with EMS and on arrival. CODE stroke called and she was Evaluated by tele-neurology not a candidate for tpa on DOAC    Started having cold like symptoms last month and since then has had poor intact, dry cough and noticied SOB today.     While in ED hypoxic with increasing oxygen requirements.     Very agitated, restless and anxious. Per daughter at beside takes ativan 2 mg and metalonin at bedtime.     Sna 127, K 2.9   CTH and CTA head and neck      1. No acute intracranial abnormality visible by CT. MRI has greater sensitivity    for acute infarct.   2. Extensive chronic small vessel ischemic changes throughout the white matter    and possible chronic lacunar infarct in the right basal ganglia/internal    capsule.   3. No intracranial arterial occlusion or hemodynamically significant stenosis.   4. 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   5. Severe/critical atherosclerotic stenosis of the bilateral internal carotid    arteries in the neck, measuring 90% on the right and 70% on the left by NASCET    criteria.   6. Vertebral arteries are widely patent bilaterally.   7. Moderate atherosclerotic stenosis at the origins of both subclavian arteries.  8. Trace interstitial edema in the lung apices. 9. Mild inflammation in the paranasal sinuses. Rec'd labetalol 10 mg IV in ED     Assessment & Plan:     Stroke-like symptoms - Ischemic CVA rule out   Home antiplatelets/anticoagulation: ASA 81 and xarelto 20   - consult neurology and vascular surgery   - ASA, xarelto, increase atorvastatin to 80 mg for now   - Fasting hgbA1C, Lipid panel  - MRI brain without contrast ; TTE with bubble  - Permissive hypertension to 220/120 mm Hg   - NS 75 cc/hr x 24 hours   - Outpatient referral to Neurology for followup    Acute hypoxic respiratory failiure - check influenza/covid, procal, d dimer, bnp. Requiring 6 L HFNC to maintain spo2 90%. Acute on chronic Hyponatremia - check urine studies. Tsh. On citalopram most associated with hyponatremia out of the SSRIs as well as remeron. Hypokalemia  replete, check mag repeat K at 7am     PAFIB // SSS - saw cardiology on 12/14 and referred to EP for PPM consideration and saw on 12/27 with plans to monitor symptoms and re-assess in 1 year. Cont. Xarelto      MR/pHTN - followd by [facility] cardiology     VIRY - cont citalopram. cont alternative agent. Patient is critically ill. Without intervention, there is a high probability of acute organ impairment or life-threatening deterioration. Critical care interventions: see plan of care  Total critical care time spent: 31 minutes.           Anticipated discharge needs:         Diet: Diet NPO - advance diet to regular   VTE ppx: xarelto   Code status: Full     Hospital Problems:  Principal Problem:    Stroke-like symptoms  Active Problems:    Hyponatremia    Hypokalemia    Acute respiratory failure with hypoxia (HCC)    Acute metabolic encephalopathy    Primary insomnia    Long term current use of anticoagulant    Generalized anxiety disorder    Gastroesophageal reflux disease without esophagitis    Pulmonary hypertension (HCC)    SSS (sick sinus syndrome) ([facility] Permanent atrial fibrillation (HCC)    Mitral valve regurgitation    History of CVA (cerebrovascular accident)    Bilateral carotid artery disease ([address]    Pure hypercholesterolemia    Essential tremor  Resolved Problems:    * No resolved hospital problems.  *       Past History:     Past Medical History:   Diagnosis Date    Carotid stenosis     With infarct    Cerebral vascular accident ([address] 07/2015    CVA (cerebrovascular accident) ([address]     Depression     GERD (gastroesophageal reflux disease)     Hypercholesterolemia     Hypertension     Mitral valve regurgitation     Paroxysmal atrial fibrillation ([address]     Pulmonary hypertension (HCC)     SSS (sick sinus syndrome) ([address]     Tachycardia-bradycardia    Stroke ([address] 2015       Past Surgical History:   Procedure Laterality Date    COLONOSCOPY  2011    GYN      sharonda--years ago    ORTHOPEDIC SURGERY  2013    left total knee        Social History     Tobacco Use    Smoking status: Never    Smokeless tobacco: Never   Substance Use Topics    Alcohol use: No     Alcohol/week: 0.0 standard drinks      Social History     Substance and Sexual Activity   Drug Use No       Family History   Problem Relation Age of Onset    Stroke Mother     Heart Disease Father         Immunization History   Administered Date(s) Administered    COVID-19, MODERNA BLUE border, Primary or Immunocompromised, (age 12y+), IM, 100 mcg/0.5mL 03/09/2021, 04/09/2021, 11/09/2021    Influenza Virus Vaccine 10/01/2015    Influenza, FLUZONE (age 72 y+), High Dose, 0.7mL 11/09/2021    Influenza, High Dose (Fluzone 65 yrs and older) 08/24/2016, 09/30/2017, 11/16/2018, 10/10/2019, 09/23/2020    Pneumococcal Conjugate 13-valent ([id number]) 08/24/2016    Pneumococcal Polysaccharide ([id number]) 01/01/2007, 07/15/2015    Td vaccine (adult) 01/01/2012    Zoster Live (Zostavax) 12/09/2013     No Known Allergies  Prior to Admit Medications:  Current Outpatient Medications   Medication Instructions    amLODIPine (NORVASC) 5 mg, Oral, DAILY    ascorbic acid (VITAMIN C) 250 MG tablet Oral    aspirin 81 MG EC tablet Oral, DAILY    atorvastatin (LIPITOR) 40 mg, Oral, DAILY    citalopram (CELEXA) 20 mg, Oral, DAILY    lisinopril (PRINIVIL;ZESTRIL) 20 mg, Oral, 2 TIMES DAILY    mirtazapine (REMERON) 7.5 mg, Oral, NIGHTLY    omeprazole (PRILOSEC) 20 mg, Oral, DAILY    rivaroxaban (XARELTO) 20 mg, Oral, DAILY WITH DINNER    spironolactone (ALDACTONE) 25 mg, Oral, DAILY         Objective:   Patient Vitals for the past 24 hrs:   Temp Pulse Resp BP SpO2   02/10/23 0115 -- 89 25 (!) 193/145 90 %   02/10/23 0110 -- 83 29 (!) 205/85 --   02/09/23 2340 -- 70 24 (!) 185/101 95 %   02/09/23 2321 -- 86 14 (!) 187/72 94 %   02/09/23 2316 -- 75 18 (!) 189/68 95 %   02/09/23 2305 -- 77 13 (!) 184/123 96 %   02/09/23 2300 -- 79 22 (!) 165/113 96 %   02/09/23 2245 -- 87 17 (!) 214/67 94 %   02/09/23 2213 97.7 °F (36.5 °C) -- -- -- --   02/09/23 2209 -- (!) 104 18 -- 92 %   02/09/23 2204 -- (!) 101 13 (!) 214/77 96 %       Oxygen Therapy  SpO2: 90 %  O2 Device: High flow nasal cannula  O2 Flow Rate (L/min): 6 L/min    Estimated body mass index is 29.85 kg/m² as calculated from the following:    Height as of 12/27/22: 5' 1\" (1.549 m). Weight as of this encounter: 158 lb (71.7 kg). No intake or output data in the 24 hours ending 02/10/23 0123      Physical Exam:    Blood pressure (!) 193/145, pulse 89, temperature 97.7 °F (36.5 °C), temperature source Oral, resp. rate 25, weight 158 lb (71.7 kg), SpO2 90 %. Physical Exam  Vitals and nursing note reviewed. Constitutional:       Appearance: She is obese. She is ill-appearing. HENT:      Head: Normocephalic and atraumatic. Nose: Nose normal.      Mouth/Throat:      Mouth: Mucous membranes are moist.   Eyes:      Extraocular Movements: Extraocular movements intact. Conjunctiva/sclera: Conjunctivae normal.      Pupils: Pupils are equal, round, and reactive to light.    Cardiovascular: Rate and Rhythm: Tachycardia present. Rhythm irregular. Heart sounds: Murmur heard. Pulmonary:      Effort: Tachypnea present. No respiratory distress. Breath sounds: Decreased air movement present. No wheezing, rhonchi or rales. Abdominal:      General: Abdomen is flat. Bowel sounds are normal. There is no distension. Palpations: Abdomen is soft. Tenderness: There is no abdominal tenderness. Musculoskeletal:      Cervical back: Neck supple. Right lower leg: No edema. Left lower leg: No edema. Skin:     General: Skin is warm. Capillary Refill: Capillary refill takes less than 2 seconds. Neurological:      General: No focal deficit present. Mental Status: She is alert. Mental status is at baseline. Comments: Aphasia    Psychiatric:         Mood and Affect: Mood is anxious. Speech: She is noncommunicative. Behavior: Behavior is agitated.          I have personally reviewed labs and tests:  Recent Labs:  Recent Results (from the past 24 hour(s))   POCT Glucose    Collection Time: 02/09/23  9:29 PM   Result Value Ref Range    POC Glucose 124 (H) 65 - 100 mg/dL    Performed by: Tanya    EKG 12 Lead    Collection Time: 02/09/23  9:31 PM   Result Value Ref Range    Ventricular Rate 89 BPM    Atrial Rate 169 BPM    QRS Duration 76 ms    Q-T Interval 463 ms    QTc Calculation (Bazett) 564 ms    R Axis 89 degrees    T Axis 49 degrees    Diagnosis Atrial fibrillation    POCT INR    Collection Time: 02/09/23  9:37 PM   Result Value Ref Range    POC Protime 14.0 (H) 9.6 - 11.6 SECS    POC INR 1.2 0.9 - 1.2     CBC    Collection Time: 02/09/23  9:37 PM   Result Value Ref Range    WBC 8.9 4.3 - 11.1 K/uL    RBC 4.32 4.05 - 5.2 M/uL    Hemoglobin 13.7 11.7 - 15.4 g/dL    Hematocrit 40.1 35.8 - 46.3 %    MCV 92.8 82 - 102 FL    MCH 31.7 26.1 - 32.9 PG    MCHC 34.2 31.4 - 35.0 g/dL    RDW 14.4 11.9 - 14.6 %    Platelets 340 372 - 820 K/uL    MPV 9.6 9.4 - 12.3 FL    nRBC 0.00 0.0 - 0.2 K/uL   Comprehensive Metabolic Panel    Collection Time: 02/09/23  9:37 PM   Result Value Ref Range    Sodium 127 (L) 133 - 143 mmol/L    Potassium 2.9 (L) 3.5 - 5.1 mmol/L    Chloride 91 (L) 101 - 110 mmol/L    CO2 25 21 - 32 mmol/L    Anion Gap 11 2 - 11 mmol/L    Glucose 119 (H) 65 - 100 mg/dL    BUN 7 (L) 8 - 23 MG/DL    Creatinine 0.70 0.6 - 1.0 MG/DL    Est, Glom Filt Rate >60 >60 ml/min/1.73m2    Calcium 9.1 8.3 - 10.4 MG/DL    Total Bilirubin 0.6 0.2 - 1.1 MG/DL    ALT 36 12 - 65 U/L    AST 25 15 - 37 U/L    Alk Phosphatase 82 50 - 136 U/L    Total Protein 7.2 6.3 - 8.2 g/dL    Albumin 3.9 3.2 - 4.6 g/dL    Globulin 3.3 2.8 - 4.5 g/dL    Albumin/Globulin Ratio 1.2 0.4 - 1.6     Protime-INR    Collection Time: 02/09/23  9:37 PM   Result Value Ref Range    Protime 18.5 (H) 12.6 - 14.3 sec    INR 1.5     Troponin    Collection Time: 02/09/23  9:37 PM   Result Value Ref Range    Troponin, High Sensitivity 10.9 0 - 14 pg/mL   Urinalysis w rflx microscopic    Collection Time: 02/09/23 10:45 PM   Result Value Ref Range    Color, UA YELLOW/STRAW      Appearance CLEAR      Specific Gravity, UA 1.030 (H) 1.001 - 1.023      pH, Urine 7.5 5.0 - 9.0      Protein, UA 30 (A) NEG mg/dL    Glucose, UA Negative mg/dL    Ketones, Urine Negative NEG mg/dL    Bilirubin Urine Negative NEG      Blood, Urine Negative NEG      Urobilinogen, Urine 1.0 0.2 - 1.0 EU/dL    Nitrite, Urine Negative NEG      Leukocyte Esterase, Urine Negative NEG      WBC, UA 0-4 U4 /hpf    RBC, UA 0-5 U5 /hpf    Epithelial Cells UA 0-5 U5 /hpf    BACTERIA, URINE Negative NEG /hpf    Casts 0-2 U2 /lpf       I have personally reviewed imaging studies:  CT HEAD WO CONTRAST    Result Date: 2/9/2023  EXAMINATION: CT HEAD WO CONTRAST, CTA HEAD NECK W CONTRAST DATE: 2/9/2023 9:30 PM  INDICATION: Weakness, aphasia, unknown last normal  COMPARISON: CTA neck from 1/8/2018.   TECHNIQUE: Thin section noncontrast axial images were obtained through the head. CT angiography through the head and neck was performed in the axial plane using  100 mL of Omnipaque 350 administered intravenously, without adverse reaction. Coronal and sagittal MIP and MPR images were generated. CT dose lowering techniques were used, to include: automated exposure control, adjustment for patient size, and or use of iterative reconstruction. FINDINGS: CT Head: Intracranial contents: No intracranial hemorrhage, evidence of acute territorial infarct, mass, mass effect or hydrocephalus. Hypodensity in the right lentiform nuclei and corona radiata most likely represents a chronic lacunar infarct. Extensive chronic small vessel ischemic changes noted throughout the white matter. Extensive intracranial atherosclerosis. Moderate global cerebral volume loss. Bones and extracranial soft tissues:  No fracture or focal osseous lesion in the calvarium or skull base. Bilateral cataract surgery, otherwise orbits are unremarkable. Mucosal thickening in the bilateral maxillary and sphenoid sinuses. Mastoid air cells and middle ear cavities are clear bilaterally. CTA of the neck: Aorta Bovine configuration of the arch, normal variant. Moderate atherosclerotic stenosis at the origins of the bilateral subclavian arteries is also made of moderate stenosis in the left axillary artery. Right Carotid Extensive atherosclerotic plaque in the distal right common carotid artery, carotid bifurcation and ICA bulb with severe stenosis of the distal common carotid artery and bifurcation and critical stenosis of the proximal ICA, 90% by  NASCET criteria. External carotid is also severely narrowed. Left Carotid The common carotid artery is widely patent. Extensive calcified plaque in the ICA origin and ICA bulb with approximately 70% stenosis by NASCET criteria. Mild  atherosclerosis stenosis of the proximal external carotid artery. Vertebral arteries The vertebral arteries are codominant.  No evidence of occlusion, stenosis, or dissection. Osseous and soft tissue structures: No soft tissue abnormality visible in the neck. Trace interstitial edema in the lung apices. No acute osseous lesions in the neck. Degenerative changes in the cervical spine. CTA of the head: Anterior circulation Atherosclerotic plaque throughout the bilateral carotid siphons without hemodynamically significant stenosis. The middle and anterior cerebral arteries are widely patent and unremarkable bilaterally. Incidental note of a 2 mm broad-based aneurysm or infundibulum directed inferiorly from the left ICA terminus on image 378 of series 301 and image 70 of series 602. Anterior cerebral arteries are widely patent. There is no A1 segment on the right side, both anterior cerebral arteries originate from the left ICA, normal variant. Posterior circulation The vertebrobasilar system, superior cerebellar arteries, and posterior cerebral  arteries are normal in caliber, without occlusion, significant stenosis, or aneurysmal dilation. Dural venous sinuses are patent where visualized. No abnormal arterial enhancement in the brain. 1. No acute intracranial abnormality visible by CT. MRI has greater sensitivity for acute infarct. 2. Extensive chronic small vessel ischemic changes throughout the white matter and possible chronic lacunar infarct in the right basal ganglia/internal capsule. 3. No intracranial arterial occlusion or hemodynamically significant stenosis. 4. 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 5. Severe/critical atherosclerotic stenosis of the bilateral internal carotid arteries in the neck, measuring 90% on the right and 70% on the left by NASCET criteria. 6. Vertebral arteries are widely patent bilaterally. 7. Moderate atherosclerotic stenosis at the origins of both subclavian arteries. 8. Trace interstitial edema in the lung apices.  9. Mild inflammation in the paranasal sinuses. Discussed with Dr. Steff Pop at 16 p.m. on 2/9/2023 This examination was interpreted by MERI Zuluaga M.D. 2/9/2023 10:20:00 PM    XR CHEST PORTABLE    Result Date: 2/9/2023  EXAMINATION: XR CHEST PORTABLE  DATE OF EXAM: 2/9/2023 10:45 PM SLOT: 60 HISTORY: hypoxia COMPARISON: None. FINDINGS: HEART/MEDIASTINUM: Enlarged cardiac silhouette. LUNGS/PLEURA: Bilateral interstitial prominence and pulmonary vascular engorgement, likely on the basis of mild pulmonary edema. MUSCULOSKELETAL: No acute osseous pathology. 1.  Cardiomegaly with mild pulmonary edema. Yris Navarro M.D. 2/9/2023 10:56:00 PM    CTA HEAD NECK W CONTRAST    Result Date: 2/9/2023  EXAMINATION: CT HEAD WO CONTRAST, CTA HEAD NECK W CONTRAST DATE: 2/9/2023 9:30 PM  INDICATION: Weakness, aphasia, unknown last normal  COMPARISON: CTA neck from 1/8/2018. TECHNIQUE: Thin section noncontrast axial images were obtained through the head. CT angiography through the head and neck was performed in the axial plane using  100 mL of Omnipaque 350 administered intravenously, without adverse reaction. Coronal and sagittal MIP and MPR images were generated. CT dose lowering techniques were used, to include: automated exposure control, adjustment for patient size, and or use of iterative reconstruction. FINDINGS: CT Head: Intracranial contents: No intracranial hemorrhage, evidence of acute territorial infarct, mass, mass effect or hydrocephalus. Hypodensity in the right lentiform nuclei and corona radiata most likely represents a chronic lacunar infarct. Extensive chronic small vessel ischemic changes noted throughout the white matter. Extensive intracranial atherosclerosis. Moderate global cerebral volume loss. Bones and extracranial soft tissues:  No fracture or focal osseous lesion in the calvarium or skull base. Bilateral cataract surgery, otherwise orbits are unremarkable.  Mucosal thickening in the bilateral maxillary and sphenoid sinuses. Mastoid air cells and middle ear cavities are clear bilaterally. CTA of the neck: Aorta Bovine configuration of the arch, normal variant. Moderate atherosclerotic stenosis at the origins of the bilateral subclavian arteries is also made of moderate stenosis in the left axillary artery. Right Carotid Extensive atherosclerotic plaque in the distal right common carotid artery, carotid bifurcation and ICA bulb with severe stenosis of the distal common carotid artery and bifurcation and critical stenosis of the proximal ICA, 90% by  NASCET criteria. External carotid is also severely narrowed. Left Carotid The common carotid artery is widely patent. Extensive calcified plaque in the ICA origin and ICA bulb with approximately 70% stenosis by NASCET criteria. Mild  atherosclerosis stenosis of the proximal external carotid artery. Vertebral arteries The vertebral arteries are codominant. No evidence of occlusion, stenosis, or dissection. Osseous and soft tissue structures: No soft tissue abnormality visible in the neck. Trace interstitial edema in the lung apices. No acute osseous lesions in the neck. Degenerative changes in the cervical spine. CTA of the head: Anterior circulation Atherosclerotic plaque throughout the bilateral carotid siphons without hemodynamically significant stenosis. The middle and anterior cerebral arteries are widely patent and unremarkable bilaterally. Incidental note of a 2 mm broad-based aneurysm or infundibulum directed inferiorly from the left ICA terminus on image 378 of series 301 and image 70 of series 602. Anterior cerebral arteries are widely patent. There is no A1 segment on the right side, both anterior cerebral arteries originate from the left ICA, normal variant.  Posterior circulation The vertebrobasilar system, superior cerebellar arteries, and posterior cerebral  arteries are normal in caliber, without occlusion, significant stenosis, or aneurysmal dilation. Dural venous sinuses are patent where visualized. No abnormal arterial enhancement in the brain. 1. No acute intracranial abnormality visible by CT. MRI has greater sensitivity for acute infarct. 2. Extensive chronic small vessel ischemic changes throughout the white matter and possible chronic lacunar infarct in the right basal ganglia/internal capsule. 3. No intracranial arterial occlusion or hemodynamically significant stenosis. 4. 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 5. Severe/critical atherosclerotic stenosis of the bilateral internal carotid arteries in the neck, measuring 90% on the right and 70% on the left by NASCET criteria. 6. Vertebral arteries are widely patent bilaterally. 7. Moderate atherosclerotic stenosis at the origins of both subclavian arteries. 8. Trace interstitial edema in the lung apices. 9. Mild inflammation in the paranasal sinuses. Discussed with Dr. Joann Sherman at 16 p.m. on 2/9/2023 This examination was interpreted by MERI Hayes M.D. 2/9/2023 10:20:00 PM      Echocardiogram:  03/07/22    TRANSTHORACIC ECHOCARDIOGRAM (TTE) COMPLETE (CONTRAST/BUBBLE/3D PRN) 03/08/2022  1:00 PM, 03/08/2022 12:00 AM (Final)    Narrative  This is a summary report. The complete report is available in the patient's medical record. If you cannot access the medical record, please contact the sending organization for a detailed fax or copy. Left Ventricle: Left ventricle size is normal. Normal wall thickness. Normal wall motion. Normal left ventricular systolic function with a visually estimated EF of 55 - 60%. Abnormal diastolic function. Mitral Valve: Moderate transvalvular regurgitation. Tricuspid Valve: Moderate transvalvular regurgitation. RVSP is 60 mmHg. Left Atrium: Left atrium is severely dilated. LA Vol Index is  37 ml/m2.     Right Atrium: Right atrium is severely dilated. Signed by: Juliocesar Kaur DO on 3/8/2022  1:00 PM, Signed by: Unknown Provider Result on 3/8/2022 12:00 AM        Orders Placed This Encounter   Medications    0.9 % sodium chloride bolus    sodium chloride flush 0.9 % injection 10 mL    iohexol (OMNIPAQUE 350) solution 100 mL    labetalol (NORMODYNE;TRANDATE) injection 10 mg    potassium chloride (KLOR-CON M) extended release tablet 40 mEq    magnesium sulfate 2000 mg in 50 mL IVPB premix    OR Linked Order Group     potassium chloride (KLOR-CON M) extended release tablet 40 mEq     potassium bicarb-citric acid (EFFER-K) effervescent tablet 40 mEq     potassium chloride 10 mEq/100 mL IVPB (Peripheral Line)         Signed:  Kali Rousseau DO, DO    Part of this note may have been written by using a voice dictation software. The note has been proof read but may still contain some grammatical/other typographical errors.

## 2023-02-10 NOTE — CONSULTS
Arranged consult with [id number] Dr Benny Aguilar [facility] Neurology via web site.  Consult ID: [id number]

## 2023-02-11 ENCOUNTER — APPOINTMENT (OUTPATIENT)
Dept: GENERAL RADIOLOGY | Age: 83
DRG: 064 | End: 2023-02-11
Payer: MEDICARE

## 2023-02-11 ENCOUNTER — APPOINTMENT (OUTPATIENT)
Dept: MRI IMAGING | Age: 83
DRG: 064 | End: 2023-02-11
Payer: MEDICARE

## 2023-02-11 LAB
ANION GAP SERPL CALC-SCNC: 9 MMOL/L (ref 2–11)
BUN SERPL-MCNC: 6 MG/DL (ref 8–23)
CALCIUM SERPL-MCNC: 8.5 MG/DL (ref 8.3–10.4)
CHLORIDE SERPL-SCNC: 94 MMOL/L (ref 101–110)
CHOLEST SERPL-MCNC: 136 MG/DL
CO2 SERPL-SCNC: 28 MMOL/L (ref 21–32)
CREAT SERPL-MCNC: 0.6 MG/DL (ref 0.6–1)
ERYTHROCYTE [DISTWIDTH] IN BLOOD BY AUTOMATED COUNT: 14.7 % (ref 11.9–14.6)
EST. AVERAGE GLUCOSE BLD GHB EST-MCNC: 123 MG/DL
GLUCOSE SERPL-MCNC: 112 MG/DL (ref 65–100)
HBA1C MFR BLD: 5.9 % (ref 4.8–5.6)
HCT VFR BLD AUTO: 39.4 % (ref 35.8–46.3)
HDLC SERPL-MCNC: 68 MG/DL (ref 40–60)
HDLC SERPL: 2
HGB BLD-MCNC: 13 G/DL (ref 11.7–15.4)
LDLC SERPL CALC-MCNC: 56.2 MG/DL
MAGNESIUM SERPL-MCNC: 2.1 MG/DL (ref 1.8–2.4)
MCH RBC QN AUTO: 31 PG (ref 26.1–32.9)
MCHC RBC AUTO-ENTMCNC: 33 G/DL (ref 31.4–35)
MCV RBC AUTO: 93.8 FL (ref 82–102)
MM INDURATION, POC: 0 MM (ref 0–5)
NRBC # BLD: 0 K/UL (ref 0–0.2)
PLATELET # BLD AUTO: 220 K/UL (ref 150–450)
PMV BLD AUTO: 9.1 FL (ref 9.4–12.3)
POTASSIUM SERPL-SCNC: 3.5 MMOL/L (ref 3.5–5.1)
PPD, POC: NEGATIVE
RBC # BLD AUTO: 4.2 M/UL (ref 4.05–5.2)
SODIUM SERPL-SCNC: 131 MMOL/L (ref 133–143)
TRIGL SERPL-MCNC: 59 MG/DL (ref 35–150)
VLDLC SERPL CALC-MCNC: 11.8 MG/DL (ref 6–23)
WBC # BLD AUTO: 11.5 K/UL (ref 4.3–11.1)

## 2023-02-11 PROCEDURE — 6370000000 HC RX 637 (ALT 250 FOR IP): Performed by: FAMILY MEDICINE

## 2023-02-11 PROCEDURE — 6370000000 HC RX 637 (ALT 250 FOR IP): Performed by: STUDENT IN AN ORGANIZED HEALTH CARE EDUCATION/TRAINING PROGRAM

## 2023-02-11 PROCEDURE — 36415 COLL VENOUS BLD VENIPUNCTURE: CPT

## 2023-02-11 PROCEDURE — 83036 HEMOGLOBIN GLYCOSYLATED A1C: CPT

## 2023-02-11 PROCEDURE — 85027 COMPLETE CBC AUTOMATED: CPT

## 2023-02-11 PROCEDURE — 80061 LIPID PANEL: CPT

## 2023-02-11 PROCEDURE — 83735 ASSAY OF MAGNESIUM: CPT

## 2023-02-11 PROCEDURE — 71045 X-RAY EXAM CHEST 1 VIEW: CPT

## 2023-02-11 PROCEDURE — 1100000003 HC PRIVATE W/ TELEMETRY

## 2023-02-11 PROCEDURE — 99223 1ST HOSP IP/OBS HIGH 75: CPT | Performed by: PSYCHIATRY & NEUROLOGY

## 2023-02-11 PROCEDURE — 6360000002 HC RX W HCPCS: Performed by: STUDENT IN AN ORGANIZED HEALTH CARE EDUCATION/TRAINING PROGRAM

## 2023-02-11 PROCEDURE — 80048 BASIC METABOLIC PNL TOTAL CA: CPT

## 2023-02-11 RX ORDER — FUROSEMIDE 10 MG/ML
40 INJECTION INTRAMUSCULAR; INTRAVENOUS DAILY
Status: DISCONTINUED | OUTPATIENT
Start: 2023-02-11 | End: 2023-02-16 | Stop reason: HOSPADM

## 2023-02-11 RX ORDER — MIDAZOLAM HYDROCHLORIDE 2 MG/2ML
0.5 INJECTION, SOLUTION INTRAMUSCULAR; INTRAVENOUS ONCE
Status: DISCONTINUED | OUTPATIENT
Start: 2023-02-11 | End: 2023-02-11

## 2023-02-11 RX ADMIN — CITALOPRAM HYDROBROMIDE 20 MG: 20 TABLET ORAL at 09:15

## 2023-02-11 RX ADMIN — CLOPIDOGREL BISULFATE 75 MG: 75 TABLET ORAL at 09:15

## 2023-02-11 RX ADMIN — FUROSEMIDE 40 MG: 10 INJECTION, SOLUTION INTRAMUSCULAR; INTRAVENOUS at 09:14

## 2023-02-11 RX ADMIN — SPIRONOLACTONE 25 MG: 25 TABLET ORAL at 09:15

## 2023-02-11 RX ADMIN — POTASSIUM CHLORIDE 40 MEQ: 1500 TABLET, EXTENDED RELEASE ORAL at 09:15

## 2023-02-11 RX ADMIN — ACETAMINOPHEN 650 MG: 325 TABLET ORAL at 18:07

## 2023-02-11 RX ADMIN — RIVAROXABAN 20 MG: 20 TABLET, FILM COATED ORAL at 17:15

## 2023-02-11 RX ADMIN — ATORVASTATIN CALCIUM 80 MG: 40 TABLET, FILM COATED ORAL at 20:28

## 2023-02-11 RX ADMIN — PANTOPRAZOLE SODIUM 40 MG: 40 TABLET, DELAYED RELEASE ORAL at 06:56

## 2023-02-11 RX ADMIN — ASPIRIN 81 MG: 81 TABLET, COATED ORAL at 09:15

## 2023-02-11 ASSESSMENT — PAIN SCALES - GENERAL: PAINLEVEL_OUTOF10: 0

## 2023-02-11 NOTE — PLAN OF CARE
Continues to have a little bit of confusion  Problem: Confusion  Goal: Confusion, delirium, dementia, or psychosis is improved or at baseline  Description: INTERVENTIONS:  1. Assess for possible contributors to thought disturbance, including medications, impaired vision or hearing, underlying metabolic abnormalities, dehydration, psychiatric diagnoses, and notify attending LIP  2. [place] high risk fall precautions, as indicated  3. Provide frequent short contacts to provide reality reorientation, refocusing and direction  4. Decrease environmental stimuli, including noise as appropriate  5. Monitor and intervene to maintain adequate nutrition, hydration, elimination, sleep and activity  6. If unable to ensure safety without constant attention obtain sitter and review sitter guidelines with assigned personnel  7.  Initiate Psychosocial CNS and Spiritual Care consult, as indicated  Outcome: Not Progressing

## 2023-02-11 NOTE — CONSULTS
[address]   [address] [facility] FAX: [phone]    Celina Corporal  : 1940      Reason for visit: Bilateral carotid stenosis, TIA    Chief Complaint: 81 y.o. female briefly awake for interview and then somnolent for remainder, discuss with  and daughter. Family reports that since 24 she has had intermittent confusion, 3 falls, and most recently temporary episode yesterday of expressive aphasia and left sided facial drop. Cognitive and functional decline occurred 3 days after transitioning from 1mg Ativan qHS to Trazadone. Daughter reports patient has good days where shes if fully functional and alert and other days when she is lethargic and confused. No reported vision changes or unilateral weakness. Hx of A-fib with CVA with hemorrhagic conversion in . Currently on Xarelto and rate controlled. MRI pending. CTA showing 90% right ICA stenosis and 70% left ICA stenosis. Evidence of extensive small vessel disease throughout white matter with possible remote lacunar infarct. No acute abnormality seen on CTA head. Plan: f/u MRI, will see progression over the next week, if she is discharge she will f/u in clinic on . Long discussion with family regarding cognitive and functional decline over the last month, will determine if she is a candidate for intervention. Recommend DAPT and Statin. Level 5 , Acute or chronic illness or injury that poses threat to life or bodily function. , surgery with risk factors below    Imaging interpreted: CTA head and neck    Smoker:  Tobacco Use      Smoking status: Never      Smokeless tobacco: Never      Complexity of illness:  HLD, Afib, and CVA    Procedures by BSVS: None    Referred by: No ref.  provider found    KACY Donaldson NP     Statin: Yes     DAPT/AC: ASA, Plavix, and Xarelto    Dye allergy: no    Metal implants or AICD: no     Constitutional:   Negative for fevers and unexplained weight loss.  Eyes:   Negative for visual disturbance. ENT:   Negative for significant hearing loss and tinnitus. Respiratory:   Negative for hemoptysis. Cardiovascular:   Negative except as noted in HPI. Gastrointestinal:   Negative for melena and abdominal pain. Genitourinary:   Negative for hematuria, renal stones. Integumentary:   Negative for rash or non-healing wounds  Hematologic/Lymphatic:   Negative for excessive bleeding hx or clotting disorder. Musculoskeletal:  Negative for active, unexplained/severe joint pain. Neurological:   Negative for stroke. Behavioral/Psych:   Negative for suicidal ideations. Endocrine:   Negative for uncontrolled diabetic symptoms including polyuria, polydipsia and poor wound healing. Physical Examination:   Height: 5' 1\" (1.549 m), Weight: 158 lb (71.7 kg), BP: 135/83, Pain 0-10: Pain Level: 3, Location: groin/lower back; General: no distress. HENT: AT  CV: irregularly irregular, normal rate  LUNG: Effort normal and breath sounds normal. No respiratory distress. Abdominal: non-distended  Extremities: no wounds or edema  Neuro: motor grossly intact    Ijeoma Valle MD    Elements of this note have been dictated using speech recognition software. As a result, errors of speech recognition may have occurred.

## 2023-02-11 NOTE — PROGRESS NOTES
Patient  confused, climbed out of bed, found sitting on floor. Daughter at bedside. Patient denies hitting head, daughter stated \"I don't think she hit her head, seen her hit the floor. \"  No skin tears or abrasions noted. Assisted back to bed. Reoriented to surroundings. Daughter remains at bedside. Bed alarm reset.

## 2023-02-11 NOTE — PROGRESS NOTES
Hospitalist Progress Note   Admit Date:  2023  9:26 PM   Name:  Washington Juan   Age:  81 y.o. Sex:  female  :  1940   MRN:  [id number]   Room:  [facility]/    Presenting Complaint: Cerebrovascular Accident     Reason(s) for Admission: Stroke-like symptoms [R29.90]  Cerebrovascular accident (CVA), unspecified mechanism [facility]) [I63.9]     Hospital Course:   Washington Juan is a 81 y.o. female with medical history of AFIB on xarelto, SSS, prior CVA s/p tpa with hemorrhagic converstion in , BL COSME, GERD who presented from home via EMS with acute onset aphasia that started 2 hours PTA. She was drinking water and it spilled out of her mouth and then family noticied she had difficulty speaking. Noted to have some confusion while en route with EMS and on arrival. CODE stroke called and she was Evaluated by tele-neurology not a candidate for tpa on DOAC     Started having cold like symptoms last month and since then has had poor intact, dry cough and noticied SOB today. While in ED hypoxic with increasing oxygen requirements. Very agitated, restless and anxious. Per daughter at beside takes ativan 2 mg and metalonin at bedtime. Sna 127, K 2.9   CTH and CTA head and neck       1. No acute intracranial abnormality visible by CT. MRI has greater sensitivity    for acute infarct. 2. Extensive chronic small vessel ischemic changes throughout the white matter    and possible chronic lacunar infarct in the right basal ganglia/internal    capsule. 3. No intracranial arterial occlusion or hemodynamically significant stenosis. 4. 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 5. Severe/critical atherosclerotic stenosis of the bilateral internal carotid    arteries in the neck, measuring 90% on the right and 70% on the left by NASCET    criteria. 6. Vertebral arteries are widely patent bilaterally.    7. Moderate atherosclerotic stenosis at the origins of both subclavian arteries. 8. Trace interstitial edema in the lung apices. 9. Mild inflammation in the paranasal sinuses. Rec'd labetalol 10 mg IV in ED         Subjective & 24hr Events (02/11/23): Patient was seen and examined at the bedside. Daughter at the bedside. MRI was unsuccessful yesterday with the sedation. Assessment & Plan:   Stroke-like symptoms - Ischemic CVA  r/o   Home antiplatelets/anticoagulation: xarelto 20   Continue Atorvastatin 80 mg, ASA 81 and plavix  -Unable to obtain MRI brain without contrast as pt is unable to stay still. ;   TTE with bubble with EF 55% to 60%   consulted neurology and vascular surgery      Acute hypoxic respiratory failiure -   Requiring 6 L HFNC to maintain spo2 90%. Leucocytosis of 11.5k due to steroids    chronic Hyponatremia -   Sodium at baseline     Hypokalemia  Resolved      PAFIB // SSS - saw cardiology on 12/14 and referred to EP for PPM consideration and saw on 12/27 with plans to monitor symptoms and re-assess in 1 year. Cont. Xarelto       MR/pHTN - followed by [facility] cardiology      VIRY - cont citalopram. cont alternative agent. PT/OT recommended IRF    Diet:  ADULT DIET;  Regular  ADULT ORAL NUTRITION SUPPLEMENT; Breakfast, Lunch, Dinner; Standard High Calorie/High Protein Oral Supplement  DVT PPx: Lovenox   Code status: Full Code    Hospital Problems:  Principal Problem:    Stroke-like symptoms  Active Problems:    Hyponatremia    Hypokalemia    Acute respiratory failure with hypoxia (HCC)    Acute metabolic encephalopathy    Primary insomnia    Long term current use of anticoagulant    Generalized anxiety disorder    Gastroesophageal reflux disease without esophagitis    Pulmonary hypertension (HCC)    SSS (sick sinus syndrome) (HCC)    Permanent atrial fibrillation (HCC)    Mitral valve regurgitation    History of CVA (cerebrovascular accident)    Bilateral carotid artery disease ([facility] [address]    Pure hypercholesterolemia    Essential tremor  Resolved Problems:    * No resolved hospital problems. *      Objective:   Patient Vitals for the past 24 hrs:   Temp Pulse Resp BP SpO2   02/11/23 1136 97.7 °F (36.5 °C) 87 17 137/64 98 %   02/11/23 0740 98.2 °F (36.8 °C) 71 16 (!) 172/84 96 %   02/11/23 0418 98.2 °F (36.8 °C) 67 18 137/80 99 %   02/11/23 0206 97.9 °F (36.6 °C) 81 19 123/87 97 %   02/10/23 2352 99.5 °F (37.5 °C) 69 18 (!) 138/94 99 %   02/10/23 2000 -- 68 -- -- --   02/10/23 1949 97.9 °F (36.6 °C) 78 18 135/83 98 %   02/10/23 1628 98.2 °F (36.8 °C) 78 19 (!) 149/89 96 %       Oxygen Therapy  SpO2: 98 %  Pulse Oximeter Device Mode: Intermittent  O2 Device: High flow nasal cannula  O2 Flow Rate (L/min): 6 L/min    Estimated body mass index is 29.12 kg/m² as calculated from the following:    Height as of this encounter: 5' 1\" (1.549 m). Weight as of this encounter: 154 lb 1.6 oz (69.9 kg). Intake/Output Summary (Last 24 hours) at 2/11/2023 1506  Last data filed at 2/11/2023 0511  Gross per 24 hour   Intake --   Output 300 ml   Net -300 ml         Physical Exam:     Blood pressure 137/64, pulse 87, temperature 97.7 °F (36.5 °C), temperature source Oral, resp. rate 17, height 5' 1\" (1.549 m), weight 154 lb 1.6 oz (69.9 kg), SpO2 98 %. General:    Well nourished. Head:  Normocephalic, atraumatic  Eyes:  Sclerae appear normal.  Pupils equally round. ENT:  Nares appear normal.  Moist oral mucosa  Neck:  No restricted ROM. Trachea midline   CV:   RRR. No m/r/g. No jugular venous distension. Lungs:   CTAB. No wheezing, rhonchi, or rales. Symmetric expansion. Abdomen:   Soft, nontender, nondistended. Extremities: No cyanosis or clubbing. No edema  Skin:     No rashes and normal coloration. Warm and dry. Neuro:  CN II-XII grossly intact. Psych:  Normal mood and affect.       I have personally reviewed labs and tests:  Recent Labs:  Recent Results (from the past 48 hour(s))   POCT Glucose Collection Time: 02/09/23  9:29 PM   Result Value Ref Range    POC Glucose 124 (H) 65 - 100 mg/dL    Performed by: Tanya    EKG 12 Lead    Collection Time: 02/09/23  9:31 PM   Result Value Ref Range    Ventricular Rate 89 BPM    Atrial Rate 169 BPM    QRS Duration 76 ms    Q-T Interval 463 ms    QTc Calculation (Bazett) 564 ms    R Axis 89 degrees    T Axis 49 degrees    Diagnosis Atrial fibrillation    POCT INR    Collection Time: 02/09/23  9:37 PM   Result Value Ref Range    POC Protime 14.0 (H) 9.6 - 11.6 SECS    POC INR 1.2 0.9 - 1.2     CBC    Collection Time: 02/09/23  9:37 PM   Result Value Ref Range    WBC 8.9 4.3 - 11.1 K/uL    RBC 4.32 4.05 - 5.2 M/uL    Hemoglobin 13.7 11.7 - 15.4 g/dL    Hematocrit 40.1 35.8 - 46.3 %    MCV 92.8 82 - 102 FL    MCH 31.7 26.1 - 32.9 PG    MCHC 34.2 31.4 - 35.0 g/dL    RDW 14.4 11.9 - 14.6 %    Platelets 504 210 - 064 K/uL    MPV 9.6 9.4 - 12.3 FL    nRBC 0.00 0.0 - 0.2 K/uL   Comprehensive Metabolic Panel    Collection Time: 02/09/23  9:37 PM   Result Value Ref Range    Sodium 127 (L) 133 - 143 mmol/L    Potassium 2.9 (L) 3.5 - 5.1 mmol/L    Chloride 91 (L) 101 - 110 mmol/L    CO2 25 21 - 32 mmol/L    Anion Gap 11 2 - 11 mmol/L    Glucose 119 (H) 65 - 100 mg/dL    BUN 7 (L) 8 - 23 MG/DL    Creatinine 0.70 0.6 - 1.0 MG/DL    Est, Glom Filt Rate >60 >60 ml/min/1.73m2    Calcium 9.1 8.3 - 10.4 MG/DL    Total Bilirubin 0.6 0.2 - 1.1 MG/DL    ALT 36 12 - 65 U/L    AST 25 15 - 37 U/L    Alk Phosphatase 82 50 - 136 U/L    Total Protein 7.2 6.3 - 8.2 g/dL    Albumin 3.9 3.2 - 4.6 g/dL    Globulin 3.3 2.8 - 4.5 g/dL    Albumin/Globulin Ratio 1.2 0.4 - 1.6     Protime-INR    Collection Time: 02/09/23  9:37 PM   Result Value Ref Range    Protime 18.5 (H) 12.6 - 14.3 sec    INR 1.5     Troponin    Collection Time: 02/09/23  9:37 PM   Result Value Ref Range    Troponin, High Sensitivity 10.9 0 - 14 pg/mL   Magnesium    Collection Time: 02/09/23  9:37 PM   Result Value Ref Range    Magnesium 1.2 (L) 1.8 - 2.4 mg/dL   Urinalysis w rflx microscopic    Collection Time: 02/09/23 10:45 PM   Result Value Ref Range    Color, UA YELLOW/STRAW      Appearance CLEAR      Specific Gravity, UA 1.030 (H) 1.001 - 1.023      pH, Urine 7.5 5.0 - 9.0      Protein, UA 30 (A) NEG mg/dL    Glucose, UA Negative mg/dL    Ketones, Urine Negative NEG mg/dL    Bilirubin Urine Negative NEG      Blood, Urine Negative NEG      Urobilinogen, Urine 1.0 0.2 - 1.0 EU/dL    Nitrite, Urine Negative NEG      Leukocyte Esterase, Urine Negative NEG      WBC, UA 0-4 U4 /hpf    RBC, UA 0-5 U5 /hpf    Epithelial Cells UA 0-5 U5 /hpf    BACTERIA, URINE Negative NEG /hpf    Casts 0-2 U2 /lpf   Urinalysis with Reflex to Culture    Collection Time: 02/10/23 12:45 AM    Specimen: Urine   Result Value Ref Range    Color, UA YELLOW/STRAW      Appearance CLEAR      Specific Gravity, UA 1.012 1.001 - 1.023      pH, Urine 8.0 5.0 - 9.0      Protein, UA 30 (A) NEG mg/dL    Glucose, UA Negative mg/dL    Ketones, Urine Negative NEG mg/dL    Bilirubin Urine Negative NEG      Blood, Urine TRACE (A) NEG      Urobilinogen, Urine 0.2 0.2 - 1.0 EU/dL    Nitrite, Urine Negative NEG      Leukocyte Esterase, Urine Negative NEG      Urine Culture if Indicated CULTURE NOT INDICATED BY UA RESULT      WBC, UA 0-4 U4 /hpf    RBC, UA 0-5 U5 /hpf    BACTERIA, URINE Negative NEG /hpf    Epithelial Cells UA 0-5 U5 /hpf    Casts 0-2 U2 /lpf    Mucus, UA 0 0 /lpf   Sodium, urine, random    Collection Time: 02/10/23 12:45 AM   Result Value Ref Range    SODIUM, RANDOM URINE 124 MMOL/L   Creatinine, Random Urine    Collection Time: 02/10/23 12:45 AM   Result Value Ref Range    Creatinine, Ur 13.00 mg/dL   Osmolality, Urine    Collection Time: 02/10/23 12:45 AM   Result Value Ref Range    Osmolality, Ur 318 50 - 1400 MOSM/kg H2O   COVID-19, Rapid    Collection Time: 02/10/23  1:50 AM    Specimen: Nasopharyngeal   Result Value Ref Range    Source NASAL O2 SARS-CoV-2, Rapid Not detected NOTD     Influenza A/B, Molecular    Collection Time: 02/10/23  1:50 AM    Specimen: Not Specified   Result Value Ref Range    Influenza A, TORIN Not detected NOTD      Influenza B, TORIN Not detected NOTD     Brain Natriuretic Peptide    Collection Time: 02/10/23  5:03 AM   Result Value Ref Range    NT Pro-BNP 2,449 (H) <450 PG/ML   D-Dimer, Quantitative    Collection Time: 02/10/23  5:03 AM   Result Value Ref Range    D-Dimer, Quant 0.48 <0.56 ug/ml(FEU)   Potassium    Collection Time: 02/10/23  5:03 AM   Result Value Ref Range    Potassium 2.9 (L) 3.5 - 5.1 mmol/L   Sodium    Collection Time: 02/10/23  5:03 AM   Result Value Ref Range    Sodium 128 (L) 133 - 143 mmol/L   Magnesium    Collection Time: 02/10/23  5:03 AM   Result Value Ref Range    Magnesium 1.1 (L) 1.8 - 2.4 mg/dL   Transthoracic echocardiogram (TTE) complete with contrast, bubble, strain, and 3D PRN    Collection Time: 02/10/23  2:10 PM   Result Value Ref Range    LV EDV A2C 50 mL    LV EDV A4C 55 mL    LV ESV A2C 19 mL    LV ESV A4C 17 mL    IVSd 0.8 0.6 - 0.9 cm    LVIDd 4.7 3.9 - 5.3 cm    LVIDs 3.1 cm    LVOT Diameter 2.0 cm    LVOT Mean Gradient 2 mmHg    LVOT VTI 19.6 cm    LVOT Peak Velocity 1.0 m/s    LVOT Peak Gradient 4 mmHg    LVPWd 0.9 0.6 - 0.9 cm    LV E' Lateral Velocity 14 cm/s    LV E' Septal Velocity 12 cm/s    LV Ejection Fraction A2C 61 %    LV Ejection Fraction A4C 69 %    EF BP 66 55 - 100 %    LVOT Area 3.1 cm2    LVOT SV 61.5 ml    LA Minor Axis 5.7 cm    LA Major Axis 5.9 cm    LA Area 2C 18.9 cm2    LA Area 4C 22.1 cm2    LA Volume 2C 51 22 - 52 mL    LA Volume 4C 67 (A) 22 - 52 mL    LA Volume BP 59 (A) 22 - 52 mL    LA Diameter 4.8 cm    AV Mean Velocity 0.9 m/s    AV Mean Gradient 4 mmHg    AV VTI 25.7 cm    AV Peak Velocity 1.3 m/s    AV Peak Gradient 7 mmHg    AV Area by VTI 2.4 cm2    AV Area by Peak Velocity 2.3 cm2    Aortic Root 2.6 cm    Ascending Aorta 2.5 cm    IVC Proxmal 1.6 cm MR .0 cm    MR Peak Velocity 6.0 m/s    MR Peak Gradient 144 mmHg    MV E Wave Deceleration Time 180.0 ms    MV A Velocity 0.31 m/s    MV E Velocity 1.04 m/s    PV .0 ms    PV Max Velocity 1.0 m/s    PV Peak Gradient 4 mmHg    RVIDd 2.9 cm    RV Basal Dimension 3.2 cm    RV Free Wall Peak S' 17 cm/s    TAPSE 1.9 1.7 cm    TR Max Velocity 3.37 m/s    TR Peak Gradient 45 mmHg    Fractional Shortening 2D 34 28 - 44 %    LV ESV Index A4C 10 mL/m2    LV EDV Index A4C 32 mL/m2    LV ESV Index A2C 11 mL/m2    LV EDV Index A2C 29 mL/m2    LVIDd Index 2.75 cm/m2    LVIDs Index 1.81 cm/m2    LV RWT Ratio 0.38     LV Mass 2D 132.3 67 - 162 g    LV Mass 2D Index 77.4 43 - 95 g/m2    MV E/A 3.35     E/E' Ratio (Averaged) 8.05     E/E' Lateral 7.43     E/E' Septal 8.67     LA Volume Index BP 35 (A) 16 - 34 ml/m2    LVOT Stroke Volume Index 36.0 mL/m2    LA Volume Index 2C 30 16 - 34 mL/m2    LA Volume Index 4C 39 (A) 16 - 34 mL/m2    LA Size Index 2.81 cm/m2    LA/AO Root Ratio 1.85     Ao Root Index 1.52 cm/m2    Ascending Aorta Index 1.46 cm/m2    AV Velocity Ratio 0.77     LVOT:AV VTI Index 0.76     CARLYLE/BSA VTI 1.4 cm2/m2    CARLYLE/BSA Peak Velocity 1.3 cm2/m2    Est. RA Pressure 3 mmHg    RVSP 48 mmHg    Left Ventricular Ejection Fraction 63     LVEF MODALITY ECHO    Basic Metabolic Panel w/ Reflex to MG    Collection Time: 02/10/23  2:53 PM   Result Value Ref Range    Sodium 131 (L) 133 - 143 mmol/L    Potassium 3.0 (L) 3.5 - 5.1 mmol/L    Chloride 92 (L) 101 - 110 mmol/L    CO2 29 21 - 32 mmol/L    Anion Gap 10 2 - 11 mmol/L    Glucose 106 (H) 65 - 100 mg/dL    BUN 3 (L) 8 - 23 MG/DL    Creatinine 0.60 0.6 - 1.0 MG/DL    Est, Glom Filt Rate >60 >60 ml/min/1.73m2    Calcium 8.8 8.3 - 10.4 MG/DL   Magnesium    Collection Time: 02/10/23  2:53 PM   Result Value Ref Range    Magnesium 1.6 (L) 1.8 - 2.4 mg/dL   Sodium    Collection Time: 02/10/23  8:03 PM   Result Value Ref Range    Sodium 128 (L) 133 - 143 mmol/L   CBC Collection Time: 02/11/23  5:47 AM   Result Value Ref Range    WBC 11.5 (H) 4.3 - 11.1 K/uL    RBC 4.20 4.05 - 5.2 M/uL    Hemoglobin 13.0 11.7 - 15.4 g/dL    Hematocrit 39.4 35.8 - 46.3 %    MCV 93.8 82 - 102 FL    MCH 31.0 26.1 - 32.9 PG    MCHC 33.0 31.4 - 35.0 g/dL    RDW 14.7 (H) 11.9 - 14.6 %    Platelets 075 080 - 951 K/uL    MPV 9.1 (L) 9.4 - 12.3 FL    nRBC 0.00 0.0 - 0.2 K/uL   Hemoglobin A1c    Collection Time: 02/11/23  5:47 AM   Result Value Ref Range    Hemoglobin A1C 5.9 (H) 4.8 - 5.6 %    eAG 123 mg/dL   Lipid Panel    Collection Time: 02/11/23  5:47 AM   Result Value Ref Range    Cholesterol, Total 136 <200 MG/DL    Triglycerides 59 35 - 150 MG/DL    HDL 68 (H) 40 - 60 MG/DL    LDL Calculated 56.2 <100 MG/DL    VLDL Cholesterol Calculated 11.8 6.0 - 23.0 MG/DL    Chol/HDL Ratio 2.0     Basic Metabolic Panel w/ Reflex to MG    Collection Time: 02/11/23  5:47 AM   Result Value Ref Range    Sodium 131 (L) 133 - 143 mmol/L    Potassium 3.5 3.5 - 5.1 mmol/L    Chloride 94 (L) 101 - 110 mmol/L    CO2 28 21 - 32 mmol/L    Anion Gap 9 2 - 11 mmol/L    Glucose 112 (H) 65 - 100 mg/dL    BUN 6 (L) 8 - 23 MG/DL    Creatinine 0.60 0.6 - 1.0 MG/DL    Est, Glom Filt Rate >60 >60 ml/min/1.73m2    Calcium 8.5 8.3 - 10.4 MG/DL   Magnesium    Collection Time: 02/11/23  5:47 AM   Result Value Ref Range    Magnesium 2.1 1.8 - 2.4 mg/dL       I have personally reviewed imaging studies:  Other Studies:  XR CHEST PORTABLE   Final Result      Mild pulmonary edema with probable small bilateral pleural effusions. XR CHEST PORTABLE   Final Result      1. Cardiomegaly with mild pulmonary edema. Carlitos Johnson M.D.    2/9/2023 10:56:00 PM      CT HEAD WO CONTRAST   Final Result      1. No acute intracranial abnormality visible by CT. MRI has greater sensitivity    for acute infarct.    2. Extensive chronic small vessel ischemic changes throughout the white matter    and possible chronic lacunar infarct in the right basal ganglia/internal    capsule. 3. No intracranial arterial occlusion or hemodynamically significant stenosis. 4. 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 5. Severe/critical atherosclerotic stenosis of the bilateral internal carotid    arteries in the neck, measuring 90% on the right and 70% on the left by NASCET    criteria. 6. Vertebral arteries are widely patent bilaterally. 7. Moderate atherosclerotic stenosis at the origins of both subclavian arteries. 8. Trace interstitial edema in the lung apices. 9. Mild inflammation in the paranasal sinuses. Discussed with Dr. Samantha Epstein at 16 p.m. on 2/9/2023            This examination was interpreted by Maxime Al M.D. Maxime Al M.D.    2/9/2023 10:20:00 PM      CTA HEAD NECK W CONTRAST   Final Result      1. No acute intracranial abnormality visible by CT. MRI has greater sensitivity    for acute infarct. 2. Extensive chronic small vessel ischemic changes throughout the white matter    and possible chronic lacunar infarct in the right basal ganglia/internal    capsule. 3. No intracranial arterial occlusion or hemodynamically significant stenosis. 4. 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 5. Severe/critical atherosclerotic stenosis of the bilateral internal carotid    arteries in the neck, measuring 90% on the right and 70% on the left by NASCET    criteria. 6. Vertebral arteries are widely patent bilaterally. 7. Moderate atherosclerotic stenosis at the origins of both subclavian arteries. 8. Trace interstitial edema in the lung apices. 9. Mild inflammation in the paranasal sinuses. Discussed with Dr. Samantha Epstein at 16 p.m. on 2/9/2023            This examination was interpreted by MERI Quigley M.D.    2/9/2023 10:20:00 PM      MRI brain without contrast    (Results Pending)       Current Meds:  Current Facility-Administered Medications   Medication Dose Route Frequency    furosemide (LASIX) injection 40 mg  40 mg IntraVENous Daily    potassium chloride (KLOR-CON M) extended release tablet 40 mEq  40 mEq Oral Once    aspirin EC tablet 81 mg  81 mg Oral Daily    atorvastatin (LIPITOR) tablet 80 mg  80 mg Oral Nightly    citalopram (CELEXA) tablet 20 mg  20 mg Oral Daily    pantoprazole (PROTONIX) tablet 40 mg  40 mg Oral QAM AC    rivaroxaban (XARELTO) tablet 20 mg  20 mg Oral Dinner    spironolactone (ALDACTONE) tablet 25 mg  25 mg Oral Daily    ondansetron (ZOFRAN-ODT) disintegrating tablet 4 mg  4 mg Oral Q8H PRN    Or    ondansetron (ZOFRAN) injection 4 mg  4 mg IntraVENous Q6H PRN    polyethylene glycol (GLYCOLAX) packet 17 g  17 g Oral Daily PRN    tuberculin injection 5 Units  5 Units IntraDERmal Once    acetaminophen (TYLENOL) tablet 650 mg  650 mg Oral Q4H PRN    Or    acetaminophen (TYLENOL) suppository 650 mg  650 mg Rectal Q4H PRN    labetalol (NORMODYNE;TRANDATE) injection 10 mg  10 mg IntraVENous Q10 Min PRN    magnesium sulfate 2000 mg in 50 mL IVPB premix  2,000 mg IntraVENous PRN    potassium chloride (KLOR-CON M) extended release tablet 40 mEq  40 mEq Oral PRN    Or    potassium bicarb-citric acid (EFFER-K) effervescent tablet 40 mEq  40 mEq Oral PRN    Or    potassium chloride 10 mEq/100 mL IVPB (Peripheral Line)  10 mEq IntraVENous PRN    clopidogrel (PLAVIX) tablet 75 mg  75 mg Oral Daily    0.9 % sodium chloride bolus  100 mL IntraVENous ONCE PRN       Signed:  Sarmad Rawls MD    Part of this note may have been written by using a voice dictation software. The note has been proof read but may still contain some grammatical/other typographical errors.

## 2023-02-12 LAB
ANION GAP SERPL CALC-SCNC: 6 MMOL/L (ref 2–11)
BASOPHILS # BLD: 0 K/UL (ref 0–0.2)
BASOPHILS NFR BLD: 0 % (ref 0–2)
BUN SERPL-MCNC: 7 MG/DL (ref 8–23)
CALCIUM SERPL-MCNC: 8.5 MG/DL (ref 8.3–10.4)
CHLORIDE SERPL-SCNC: 93 MMOL/L (ref 101–110)
CO2 SERPL-SCNC: 29 MMOL/L (ref 21–32)
CREAT SERPL-MCNC: 0.7 MG/DL (ref 0.6–1)
DIFFERENTIAL METHOD BLD: ABNORMAL
EOSINOPHIL # BLD: 0.2 K/UL (ref 0–0.8)
EOSINOPHIL NFR BLD: 2 % (ref 0.5–7.8)
ERYTHROCYTE [DISTWIDTH] IN BLOOD BY AUTOMATED COUNT: 14.3 % (ref 11.9–14.6)
GLUCOSE SERPL-MCNC: 98 MG/DL (ref 65–100)
HCT VFR BLD AUTO: 36.9 % (ref 35.8–46.3)
HGB BLD-MCNC: 12.2 G/DL (ref 11.7–15.4)
IMM GRANULOCYTES # BLD AUTO: 0 K/UL (ref 0–0.5)
IMM GRANULOCYTES NFR BLD AUTO: 0 % (ref 0–5)
LYMPHOCYTES # BLD: 2.7 K/UL (ref 0.5–4.6)
LYMPHOCYTES NFR BLD: 31 % (ref 13–44)
MCH RBC QN AUTO: 30.8 PG (ref 26.1–32.9)
MCHC RBC AUTO-ENTMCNC: 33.1 G/DL (ref 31.4–35)
MCV RBC AUTO: 93.2 FL (ref 82–102)
MM INDURATION, POC: 0 MM (ref 0–5)
MONOCYTES # BLD: 0.8 K/UL (ref 0.1–1.3)
MONOCYTES NFR BLD: 9 % (ref 4–12)
NEUTS SEG # BLD: 4.9 K/UL (ref 1.7–8.2)
NEUTS SEG NFR BLD: 58 % (ref 43–78)
NRBC # BLD: 0 K/UL (ref 0–0.2)
PHOSPHATE SERPL-MCNC: 2.4 MG/DL (ref 2.3–3.7)
PLATELET # BLD AUTO: 220 K/UL (ref 150–450)
PMV BLD AUTO: 9.1 FL (ref 9.4–12.3)
POTASSIUM SERPL-SCNC: 3.6 MMOL/L (ref 3.5–5.1)
PPD, POC: NEGATIVE
RBC # BLD AUTO: 3.96 M/UL (ref 4.05–5.2)
SODIUM SERPL-SCNC: 128 MMOL/L (ref 133–143)
WBC # BLD AUTO: 8.7 K/UL (ref 4.3–11.1)

## 2023-02-12 PROCEDURE — 1100000003 HC PRIVATE W/ TELEMETRY

## 2023-02-12 PROCEDURE — 6370000000 HC RX 637 (ALT 250 FOR IP): Performed by: PSYCHIATRY & NEUROLOGY

## 2023-02-12 PROCEDURE — 85025 COMPLETE CBC W/AUTO DIFF WBC: CPT

## 2023-02-12 PROCEDURE — 80048 BASIC METABOLIC PNL TOTAL CA: CPT

## 2023-02-12 PROCEDURE — 2580000003 HC RX 258: Performed by: STUDENT IN AN ORGANIZED HEALTH CARE EDUCATION/TRAINING PROGRAM

## 2023-02-12 PROCEDURE — 6370000000 HC RX 637 (ALT 250 FOR IP): Performed by: HOSPITALIST

## 2023-02-12 PROCEDURE — 6370000000 HC RX 637 (ALT 250 FOR IP): Performed by: STUDENT IN AN ORGANIZED HEALTH CARE EDUCATION/TRAINING PROGRAM

## 2023-02-12 PROCEDURE — 84100 ASSAY OF PHOSPHORUS: CPT

## 2023-02-12 PROCEDURE — 6370000000 HC RX 637 (ALT 250 FOR IP): Performed by: FAMILY MEDICINE

## 2023-02-12 PROCEDURE — 36415 COLL VENOUS BLD VENIPUNCTURE: CPT

## 2023-02-12 PROCEDURE — 6360000002 HC RX W HCPCS: Performed by: STUDENT IN AN ORGANIZED HEALTH CARE EDUCATION/TRAINING PROGRAM

## 2023-02-12 RX ORDER — SODIUM CHLORIDE 9 MG/ML
INJECTION, SOLUTION INTRAVENOUS CONTINUOUS
Status: DISCONTINUED | OUTPATIENT
Start: 2023-02-12 | End: 2023-02-12

## 2023-02-12 RX ORDER — LISINOPRIL 20 MG/1
40 TABLET ORAL DAILY
Status: DISCONTINUED | OUTPATIENT
Start: 2023-02-12 | End: 2023-02-16 | Stop reason: HOSPADM

## 2023-02-12 RX ORDER — LORAZEPAM 1 MG/1
1 TABLET ORAL NIGHTLY PRN
Status: DISCONTINUED | OUTPATIENT
Start: 2023-02-12 | End: 2023-02-16 | Stop reason: HOSPADM

## 2023-02-12 RX ADMIN — ACETAMINOPHEN 650 MG: 325 TABLET ORAL at 05:38

## 2023-02-12 RX ADMIN — LISINOPRIL 40 MG: 20 TABLET ORAL at 08:48

## 2023-02-12 RX ADMIN — ACETAMINOPHEN 650 MG: 325 TABLET ORAL at 00:40

## 2023-02-12 RX ADMIN — SODIUM CHLORIDE: 9 INJECTION, SOLUTION INTRAVENOUS at 10:10

## 2023-02-12 RX ADMIN — RIVAROXABAN 20 MG: 20 TABLET, FILM COATED ORAL at 17:27

## 2023-02-12 RX ADMIN — ATORVASTATIN CALCIUM 80 MG: 40 TABLET, FILM COATED ORAL at 21:24

## 2023-02-12 RX ADMIN — FUROSEMIDE 40 MG: 10 INJECTION, SOLUTION INTRAMUSCULAR; INTRAVENOUS at 08:48

## 2023-02-12 RX ADMIN — ONDANSETRON 4 MG: 4 TABLET, ORALLY DISINTEGRATING ORAL at 21:26

## 2023-02-12 RX ADMIN — LORAZEPAM 1 MG: 1 TABLET ORAL at 21:23

## 2023-02-12 RX ADMIN — CARBIDOPA AND LEVODOPA 0.5 TABLET: 25; 100 TABLET ORAL at 13:57

## 2023-02-12 RX ADMIN — PANTOPRAZOLE SODIUM 40 MG: 40 TABLET, DELAYED RELEASE ORAL at 05:38

## 2023-02-12 RX ADMIN — CITALOPRAM HYDROBROMIDE 20 MG: 20 TABLET ORAL at 08:47

## 2023-02-12 RX ADMIN — CARBIDOPA AND LEVODOPA 0.5 TABLET: 25; 100 TABLET ORAL at 21:23

## 2023-02-12 RX ADMIN — ACETAMINOPHEN 650 MG: 325 TABLET ORAL at 17:27

## 2023-02-12 RX ADMIN — LORAZEPAM 1 MG: 1 TABLET ORAL at 01:02

## 2023-02-12 RX ADMIN — SPIRONOLACTONE 25 MG: 25 TABLET ORAL at 08:47

## 2023-02-12 RX ADMIN — ASPIRIN 81 MG: 81 TABLET, COATED ORAL at 08:47

## 2023-02-12 ASSESSMENT — PAIN SCALES - GENERAL
PAINLEVEL_OUTOF10: 0
PAINLEVEL_OUTOF10: 0

## 2023-02-12 NOTE — PROGRESS NOTES
Reviewed notes for new spiritual concerns      Will continue to assess how we can best serve this family        [place].       Per notes:       PREFERRED NAME -  CATA    Shinto SHELL    LIVES IN [place]    SPOUSE -  HITESH    FULL CODE    MY CHART IS ACTIVE

## 2023-02-12 NOTE — PROGRESS NOTES
It is with  great LACY we pray for your family today: \"Trust in the LORD with all your heart     and lean not on your own  understanding; In all your ways submit to HIM,     and HE will make your paths straight. \"          May God's favor and peace be [id number] you this day.             Andrez Delgado

## 2023-02-12 NOTE — CONSULTS
[address] [facility] Neurology [address], [address] [place] [address]            Devin Felder is a 81 y.o. female who presents on referral from the hospitalist service with reference to new onset of aphasia the patient is an [de-identified]81year-old female who has a known history of carotid artery disease has a history of paroxysmal atrial fibrillation pulmonary hypertension. Additionally she has a tremor and that was identified by primary care is being essential in nature although it is strikingly asymmetric pill-rolling and present at rest which would indicate that it is more Parkinson's etiology. .  At the time she initially presented to the emergency room she was 2 hours following the onset of an acute aphasia.   She has had some symptoms of a generalized nature at the time she was initially seen she was agitated and it is noted that she utilizes benzodiazepines routinely at home      Past Medical History:   Diagnosis Date    Carotid stenosis     With infarct    Cerebral vascular accident [facility]) 07/2015    CVA (cerebrovascular accident) ([address]     Depression     GERD (gastroesophageal reflux disease)     Hypercholesterolemia     Hypertension     Mitral valve regurgitation     Paroxysmal atrial fibrillation ([address]     Pulmonary hypertension ([address]     SSS (sick sinus syndrome) ([address]     Tachycardia-bradycardia    Stroke ([address] 2015       Past Surgical History:   Procedure Laterality Date    COLONOSCOPY  2011    GYN      sharonda--years ago    ORTHOPEDIC SURGERY  2013    left total knee        Family History   Problem Relation Age of Onset    Stroke Mother     Heart Disease Father         Social History     Socioeconomic History    Marital status:    Tobacco Use    Smoking status: Never    Smokeless tobacco: Never   Vaping Use    Vaping Use: Never used   Substance and Sexual Activity    Alcohol use: No     Alcohol/week: 0.0 standard drinks    Drug use: No    Sexual activity: Not Currently         Current Facility-Administered Medications   Medication Dose Route Frequency Provider Last Rate Last Admin    furosemide (LASIX) injection 40 mg  40 mg IntraVENous Daily Lakisha Colon MD   40 mg at 02/11/23 0914    potassium chloride (KLOR-CON M) extended release tablet 40 mEq  40 mEq Oral Once Minda Zuniga DO        aspirin EC tablet 81 mg  81 mg Oral Daily Minda Zuniga DO   81 mg at 02/11/23 0915    atorvastatin (LIPITOR) tablet 80 mg  80 mg Oral Nightly Minda Zuniga DO   80 mg at 02/10/23 2133    citalopram (CELEXA) tablet 20 mg  20 mg Oral Daily Minda Zuniga DO   20 mg at 02/11/23 0915    pantoprazole (PROTONIX) tablet 40 mg  40 mg Oral QAM AC Minda Zuniga DO   40 mg at 02/11/23 9527    rivaroxaban (XARELTO) tablet 20 mg  20 mg Oral Dinner Minda Zuniga DO   20 mg at 02/11/23 1715    spironolactone (ALDACTONE) tablet 25 mg  25 mg Oral Daily Minda Zuniga DO   25 mg at 02/11/23 0915    ondansetron (ZOFRAN-ODT) disintegrating tablet 4 mg  4 mg Oral Q8H PRN Minda Zuniga DO        Or    ondansetron [facility]) injection 4 mg  4 mg IntraVENous Q6H PRN Minda Zuniga DO   4 mg at 02/10/23 0256    polyethylene glycol (GLYCOLAX) packet 17 g  17 g Oral Daily PRN Minda Zuniga DO        acetaminophen (TYLENOL) tablet 650 mg  650 mg Oral Q4H PRN Minda Zuniga DO   650 mg at 02/11/23 1807    Or    acetaminophen (TYLENOL) suppository 650 mg  650 mg Rectal Q4H PRN Minda Zuniga DO        labetalol (NORMODYNE;TRANDATE) injection 10 mg  10 mg IntraVENous Q10 Min PRN Minda Zuniga DO        magnesium sulfate 2000 mg in 50 mL IVPB premix  2,000 mg IntraVENous PRN Minda Zuniga DO        potassium chloride (KLOR-CON M) extended release tablet 40 mEq  40 mEq Oral PRN Minda Zuniga DO        Or    potassium bicarb-citric acid (EFFER-K) effervescent tablet 40 mEq  40 mEq Oral PRN Minda Zuniga DO        Or    potassium chloride 10 mEq/100 mL IVPB (Peripheral Line)  10 mEq IntraVENous PRN Minda Zuniga DO        clopidogrel (PLAVIX) tablet 75 mg  75 mg Oral Daily Nehemiah Jimenez MD   75 mg at 02/11/23 0915    0.9 % sodium chloride bolus  100 mL IntraVENous ONCE PRN Meche Pineda MD            No Known Allergies    Review of Systems  Patient's confusion and aphasia prohibits  BP (!) 153/63   Pulse 82   Temp 98.1 °F (36.7 °C) (Oral)   Resp 18   Ht 5' 1\" (1.549 m)   Wt 154 lb 1.6 oz (69.9 kg)   SpO2 100%   BMI 29.12 kg/m²     Neurologic Exam  Confusional state. She is able to name common objects but going into more detail has obvious deficiencies for instance she cannot name the hands of a watch. She was accurately able to tell the time on the wall clock. Could not tell me the date  Cranial nerve examination demonstrates some degree of facial masking I do not demonstrate visual field abnormality  Motor examination discloses that there is a pill-rolling tremor in the right upper extremity there is no drift of the arms  There is no drift in the legs  There is some slowing in neuromuscular function    Most recent MRI   No results found for this or any previous visit. Most recent MRA   No results found for this or any previous visit. Most recent CTA  Results for orders placed during the hospital encounter of 02/09/23    CTA HEAD NECK W CONTRAST    Narrative  EXAMINATION: CT HEAD WO CONTRAST, CTA HEAD NECK W CONTRAST    DATE: 2/9/2023 9:30 PM    INDICATION: Weakness, aphasia, unknown last normal    COMPARISON: CTA neck from 1/8/2018. TECHNIQUE: Thin section noncontrast axial images were obtained through the head. CT angiography through the head and neck was performed in the axial plane using  100 mL of Omnipaque 350 administered intravenously, without adverse reaction. Coronal and sagittal MIP and MPR images were generated. CT dose lowering  techniques were used, to include: automated exposure control, adjustment for  patient size, and or use of iterative reconstruction.     FINDINGS:    CT Head:    Intracranial contents:    No intracranial hemorrhage, evidence of acute territorial infarct, mass, mass  effect or hydrocephalus. Hypodensity in the right lentiform nuclei and corona  radiata most likely represents a chronic lacunar infarct. Extensive chronic  small vessel ischemic changes noted throughout the white matter. Extensive  intracranial atherosclerosis. Moderate global cerebral volume loss. Bones and extracranial soft tissues:    No fracture or focal osseous lesion in the calvarium or skull base. Bilateral  cataract surgery, otherwise orbits are unremarkable. Mucosal thickening in the  bilateral maxillary and sphenoid sinuses. Mastoid air cells and middle ear  cavities are clear bilaterally. CTA of the neck:    Aorta  Bovine configuration of the arch, normal variant. Moderate atherosclerotic  stenosis at the origins of the bilateral subclavian arteries is also made of  moderate stenosis in the left axillary artery. Right Carotid  Extensive atherosclerotic plaque in the distal right common carotid artery,  carotid bifurcation and ICA bulb with severe stenosis of the distal common  carotid artery and bifurcation and critical stenosis of the proximal ICA, 90% by  NASCET criteria. External carotid is also severely narrowed. Left Carotid  The common carotid artery is widely patent. Extensive calcified plaque in the  ICA origin and ICA bulb with approximately 70% stenosis by NASCET criteria. Mild  atherosclerosis stenosis of the proximal external carotid artery. Vertebral arteries  The vertebral arteries are codominant. No evidence of occlusion, stenosis, or  dissection. Osseous and soft tissue structures:    No soft tissue abnormality visible in the neck. Trace interstitial edema in the  lung apices. No acute osseous lesions in the neck. Degenerative changes in the  cervical spine.       CTA of the head:    Anterior circulation  Atherosclerotic plaque throughout the bilateral carotid siphons without  hemodynamically significant stenosis. The middle and anterior cerebral arteries  are widely patent and unremarkable bilaterally. Incidental note of a 2 mm  broad-based aneurysm or infundibulum directed inferiorly from the left ICA  terminus on image 378 of series 301 and image 70 of series 602. Anterior  cerebral arteries are widely patent. There is no A1 segment on the right side,  both anterior cerebral arteries originate from the left ICA, normal variant. Posterior circulation  The vertebrobasilar system, superior cerebellar arteries, and posterior cerebral  arteries are normal in caliber, without occlusion, significant stenosis, or  aneurysmal dilation. Dural venous sinuses are patent where visualized. No abnormal arterial  enhancement in the brain. Impression  1. No acute intracranial abnormality visible by CT. MRI has greater sensitivity  for acute infarct. 2. Extensive chronic small vessel ischemic changes throughout the white matter  and possible chronic lacunar infarct in the right basal ganglia/internal  capsule. 3. No intracranial arterial occlusion or hemodynamically significant stenosis. 4. 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 5. Severe/critical atherosclerotic stenosis of the bilateral internal carotid  arteries in the neck, measuring 90% on the right and 70% on the left by NASCET  criteria. 6. Vertebral arteries are widely patent bilaterally. 7. Moderate atherosclerotic stenosis at the origins of both subclavian arteries. 8. Trace interstitial edema in the lung apices. 9. Mild inflammation in the paranasal sinuses. Discussed with Dr. Ximena Alvarez at 16 p.m. on 2/9/2023        This examination was interpreted by Mario Eric M.D.     Mario Eric M.D.  2/9/2023 10:20:00 PM       Most recent Echo  Results for orders placed during the hospital encounter of 02/09/23    Transthoracic echocardiogram (TTE) complete with contrast, bubble, strain, and 3D PRN    Interpretation Summary    Left Ventricle: Normal left ventricular systolic function with a visually estimated EF of 60 - 65%. Left ventricle size is normal. Normal wall thickness. Normal wall motion. Normal diastolic function. Aortic Valve: Mild sclerosis of the aortic valve cusp. Mitral Valve: Mildly thickened leaflet, at the anterior leaflet. Moderate regurgitation. Tricuspid Valve: Mild to moderate regurgitation. Moderately elevated RVSP. The estimated RVSP is 48 mmHg. Left Atrium: Left atrium is mildly dilated. LA Vol Index is  35 ml/m2. Interatrial Septum: Agitated saline study was negative with and without provocation. Right Atrium: Right atrium is mildly dilated. Technical qualifiers: Color flow Doppler was performed and pulse wave and/or continuous wave Doppler was performed. Most recent lipid panels  Lab Results   Component Value Date/Time    CHOL 136 02/11/2023 05:47 AM    HDL 68 02/11/2023 05:47 AM    VLDL 23 12/02/2021 09:20 AM       Most recent Hgb A1C  No results found for: HBA1C, [id number]      Assessment/Plan:    71-year-old female  Clear-cut onset of confusion and aphasia over and above baseline awaiting MRI but high degree of suspicion for ischemic event given atrial fibrillation and bilateral carotid disease.   MRI personally enlighten if other factors such as amyloid angiopathy etc.  I would be reluctant to add dual antiplatelet therapy to the patient's current anticoagulation risk as there is no literature evidence to suggest a reduction in stroke risk with this approach and it is felt to be contraindicated based upon increased incidence of hemorrhagic cerebral events  An additional factor is that the patient does have a degree of masking of the face and right arm tremor which is quite clearly parkinsonian in nature  The patient additionally has a degree of bradykinesia constipation and other features to suggest that disorder negative REM behavioral but she does have a degree of confusion which may be related to this process or diffuse white matter disease indeed she may have Parkinson's on a vascular basis  Recommendations  Await MRI would continue with current Eliquis therapy I would not as noted above add dual antiplatelets as even in the presence of carotid disease there is no literature supporting a triple therapy approach  We will add in a small amount of carbidopa levodopa for her tremor with do regard that we need to observe her from a behavioral standpoint        Jayde Triplett MD

## 2023-02-12 NOTE — PROGRESS NOTES
Hospitalist Progress Note   Admit Date:  2023  9:26 PM   Name:  Kunal Hussein   Age:  81 y.o. Sex:  female  :  1940   MRN:  [id number]   Room:  [facility]/    Presenting Complaint: Cerebrovascular Accident     Reason(s) for Admission: Stroke-like symptoms [R29.90]  Cerebrovascular accident (CVA), unspecified mechanism [facility]) [I63.9]     Hospital Course:   Kunal Hussein is a 81 y.o. female with medical history of AFIB on xarelto, SSS, prior CVA s/p tpa with hemorrhagic converstion in , BL COSME, GERD who presented from home via EMS with acute onset aphasia that started 2 hours PTA. She was drinking water and it spilled out of her mouth and then family noticied she had difficulty speaking. Noted to have some confusion while en route with EMS and on arrival. CODE stroke called and she was Evaluated by tele-neurology not a candidate for tpa on DOAC     Started having cold like symptoms last month and since then has had poor intact, dry cough and noticied SOB today. While in ED hypoxic with increasing oxygen requirements. Very agitated, restless and anxious. Per daughter at beside takes ativan 2 mg and metalonin at bedtime. Sna 127, K 2.9   CTH and CTA head and neck       1. No acute intracranial abnormality visible by CT. MRI has greater sensitivity    for acute infarct. 2. Extensive chronic small vessel ischemic changes throughout the white matter    and possible chronic lacunar infarct in the right basal ganglia/internal    capsule. 3. No intracranial arterial occlusion or hemodynamically significant stenosis. 4. 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 5. Severe/critical atherosclerotic stenosis of the bilateral internal carotid    arteries in the neck, measuring 90% on the right and 70% on the left by NASCET    criteria. 6. Vertebral arteries are widely patent bilaterally.    7. Moderate atherosclerotic stenosis at the origins of both subclavian arteries. 8. Trace interstitial edema in the lung apices. 9. Mild inflammation in the paranasal sinuses. Rec'd labetalol 10 mg IV in ED         Subjective & 24hr Events (02/12/23): Patient was seen and examined at the bedside. Daughter at the bedside. Pt is coughing and is having nasal congestion. She is off oxygen and is on RA. She was sitting comfortably on the chair. Assessment & Plan:   Stroke-like symptoms - Ischemic CVA  r/o   Home antiplatelets/anticoagulation: xarelto 20   Continue Atorvastatin 80 mg  Discontinued plavix and ASA 81  -Unable to obtain MRI brain without contrast as pt is unable to stay still. Awaiting MRI with sedation on 2/13   TTE with bubble with EF 55% to 60%  vascular surgery following     Newly diagnosed Parkinsonism  Clinically diagnosed with PD. Degree of bradykinesia, constipation, masking of the face and right arm tremor and recurrent falls   Added sinemet  Neurology following      Acute hypoxic respiratory failiure -   Resolved and is on RA   Leucocytosis resolved    chronic Hyponatremia -   Sodium at baseline     Hypokalemia  Resolved      PAFIB // SSS - saw cardiology on 12/14 and referred to EP for PPM consideration and saw on 12/27 with plans to monitor symptoms and re-assess in 1 year. Cont. Xarelto       MR/pHTN - followed by [place] [facility]      VIRY - cont citalopram. cont alternative agent. PT/OT recommended IRF    Diet:  ADULT DIET;  Regular  ADULT ORAL NUTRITION SUPPLEMENT; Breakfast, Lunch, Dinner; Standard High Calorie/High Protein Oral Supplement  DVT PPx: Lovenox   Code status: Full Code    Hospital Problems:  Principal Problem:    Stroke-like symptoms  Active Problems:    Hyponatremia    Hypokalemia    Acute respiratory failure with hypoxia (HCC)    Acute metabolic encephalopathy    Primary insomnia    Long term current use of anticoagulant    Generalized anxiety disorder    Gastroesophageal reflux disease without esophagitis    Pulmonary hypertension (HCC)    SSS (sick sinus syndrome) (HCC)    Permanent atrial fibrillation (HCC)    Mitral valve regurgitation    History of CVA (cerebrovascular accident)    Bilateral carotid artery disease ([address]    Pure hypercholesterolemia    Essential tremor  Resolved Problems:    * No resolved hospital problems. *      Objective:   Patient Vitals for the past 24 hrs:   Temp Pulse Resp BP SpO2   02/12/23 1155 97.9 °F (36.6 °C) 66 16 (!) 171/68 97 %   02/12/23 0808 98.1 °F (36.7 °C) 65 16 (!) 186/75 99 %   02/12/23 0509 97.9 °F (36.6 °C) 69 22 (!) 180/68 99 %   02/12/23 0041 97.9 °F (36.6 °C) 80 20 (!) 184/70 94 %   02/11/23 1934 98.1 °F (36.7 °C) 82 18 (!) 153/63 100 %   02/11/23 1549 97.7 °F (36.5 °C) 57 18 (!) 116/54 100 %       Oxygen Therapy  SpO2: 97 %  Pulse Oximeter Device Mode: Intermittent  O2 Device: None (Room air)  O2 Flow Rate (L/min): 2 L/min    Estimated body mass index is 29.12 kg/m² as calculated from the following:    Height as of this encounter: 5' 1\" (1.549 m). Weight as of this encounter: 154 lb 1.6 oz (69.9 kg). No intake or output data in the 24 hours ending 02/12/23 1304        Physical Exam:     Blood pressure (!) 171/68, pulse 66, temperature 97.9 °F (36.6 °C), temperature source Axillary, resp. rate 16, height 5' 1\" (1.549 m), weight 154 lb 1.6 oz (69.9 kg), SpO2 97 %. General:    Well nourished. Head:  Normocephalic, atraumatic  Eyes:  Sclerae appear normal.  Pupils equally round. ENT:  Nares appear normal.  Moist oral mucosa  Neck:  No restricted ROM. Trachea midline   CV:   RRR. No m/r/g. No jugular venous distension. Lungs:   CTAB. No wheezing, rhonchi, or rales. Symmetric expansion. Abdomen:   Soft, nontender, nondistended. Extremities: No cyanosis or clubbing. No edema  Skin:     No rashes and normal coloration. Warm and dry. Neuro:  CN II-XII grossly intact. Psych:  Normal mood and affect.       I have personally reviewed labs and tests:  Recent Labs:  Recent Results (from the past 48 hour(s))   Transthoracic echocardiogram (TTE) complete with contrast, bubble, strain, and 3D PRN    Collection Time: 02/10/23  2:10 PM   Result Value Ref Range    LV EDV A2C 50 mL    LV EDV A4C 55 mL    LV ESV A2C 19 mL    LV ESV A4C 17 mL    IVSd 0.8 0.6 - 0.9 cm    LVIDd 4.7 3.9 - 5.3 cm    LVIDs 3.1 cm    LVOT Diameter 2.0 cm    LVOT Mean Gradient 2 mmHg    LVOT VTI 19.6 cm    LVOT Peak Velocity 1.0 m/s    LVOT Peak Gradient 4 mmHg    LVPWd 0.9 0.6 - 0.9 cm    LV E' Lateral Velocity 14 cm/s    LV E' Septal Velocity 12 cm/s    LV Ejection Fraction A2C 61 %    LV Ejection Fraction A4C 69 %    EF BP 66 55 - 100 %    LVOT Area 3.1 cm2    LVOT SV 61.5 ml    LA Minor Axis 5.7 cm    LA Major Axis 5.9 cm    LA Area 2C 18.9 cm2    LA Area 4C 22.1 cm2    LA Volume 2C 51 22 - 52 mL    LA Volume 4C 67 (A) 22 - 52 mL    LA Volume BP 59 (A) 22 - 52 mL    LA Diameter 4.8 cm    AV Mean Velocity 0.9 m/s    AV Mean Gradient 4 mmHg    AV VTI 25.7 cm    AV Peak Velocity 1.3 m/s    AV Peak Gradient 7 mmHg    AV Area by VTI 2.4 cm2    AV Area by Peak Velocity 2.3 cm2    Aortic Root 2.6 cm    Ascending Aorta 2.5 cm    IVC Proxmal 1.6 cm    MR .0 cm    MR Peak Velocity 6.0 m/s    MR Peak Gradient 144 mmHg    MV E Wave Deceleration Time 180.0 ms    MV A Velocity 0.31 m/s    MV E Velocity 1.04 m/s    PV .0 ms    PV Max Velocity 1.0 m/s    PV Peak Gradient 4 mmHg    RVIDd 2.9 cm    RV Basal Dimension 3.2 cm    RV Free Wall Peak S' 17 cm/s    TAPSE 1.9 1.7 cm    TR Max Velocity 3.37 m/s    TR Peak Gradient 45 mmHg    Fractional Shortening 2D 34 28 - 44 %    LV ESV Index A4C 10 mL/m2    LV EDV Index A4C 32 mL/m2    LV ESV Index A2C 11 mL/m2    LV EDV Index A2C 29 mL/m2    LVIDd Index 2.75 cm/m2    LVIDs Index 1.81 cm/m2    LV RWT Ratio 0.38     LV Mass 2D 132.3 67 - 162 g    LV Mass 2D Index 77.4 43 - 95 g/m2    MV E/A 3.35     E/E' Ratio (Averaged) 8.05 E/E' Lateral 7.43     E/E' Septal 8.67     LA Volume Index BP 35 (A) 16 - 34 ml/m2    LVOT Stroke Volume Index 36.0 mL/m2    LA Volume Index 2C 30 16 - 34 mL/m2    LA Volume Index 4C 39 (A) 16 - 34 mL/m2    LA Size Index 2.81 cm/m2    LA/AO Root Ratio 1.85     Ao Root Index 1.52 cm/m2    Ascending Aorta Index 1.46 cm/m2    AV Velocity Ratio 0.77     LVOT:AV VTI Index 0.76     CARLYLE/BSA VTI 1.4 cm2/m2    CARLYLE/BSA Peak Velocity 1.3 cm2/m2    Est. RA Pressure 3 mmHg    RVSP 48 mmHg    Left Ventricular Ejection Fraction 63     LVEF MODALITY ECHO    Basic Metabolic Panel w/ Reflex to MG    Collection Time: 02/10/23  2:53 PM   Result Value Ref Range    Sodium 131 (L) 133 - 143 mmol/L    Potassium 3.0 (L) 3.5 - 5.1 mmol/L    Chloride 92 (L) 101 - 110 mmol/L    CO2 29 21 - 32 mmol/L    Anion Gap 10 2 - 11 mmol/L    Glucose 106 (H) 65 - 100 mg/dL    BUN 3 (L) 8 - 23 MG/DL    Creatinine 0.60 0.6 - 1.0 MG/DL    Est, Glom Filt Rate >60 >60 ml/min/1.73m2    Calcium 8.8 8.3 - 10.4 MG/DL   Magnesium    Collection Time: 02/10/23  2:53 PM   Result Value Ref Range    Magnesium 1.6 (L) 1.8 - 2.4 mg/dL   Sodium    Collection Time: 02/10/23  8:03 PM   Result Value Ref Range    Sodium 128 (L) 133 - 143 mmol/L   CBC    Collection Time: 02/11/23  5:47 AM   Result Value Ref Range    WBC 11.5 (H) 4.3 - 11.1 K/uL    RBC 4.20 4.05 - 5.2 M/uL    Hemoglobin 13.0 11.7 - 15.4 g/dL    Hematocrit 39.4 35.8 - 46.3 %    MCV 93.8 82 - 102 FL    MCH 31.0 26.1 - 32.9 PG    MCHC 33.0 31.4 - 35.0 g/dL    RDW 14.7 (H) 11.9 - 14.6 %    Platelets 143 756 - 667 K/uL    MPV 9.1 (L) 9.4 - 12.3 FL    nRBC 0.00 0.0 - 0.2 K/uL   Hemoglobin A1c    Collection Time: 02/11/23  5:47 AM   Result Value Ref Range    Hemoglobin A1C 5.9 (H) 4.8 - 5.6 %    eAG 123 mg/dL   Lipid Panel    Collection Time: 02/11/23  5:47 AM   Result Value Ref Range    Cholesterol, Total 136 <200 MG/DL    Triglycerides 59 35 - 150 MG/DL    HDL 68 (H) 40 - 60 MG/DL    LDL Calculated 56.2 <100 MG/DL    VLDL Cholesterol Calculated 11.8 6.0 - 23.0 MG/DL    Chol/HDL Ratio 2.0     Basic Metabolic Panel w/ Reflex to MG    Collection Time: 02/11/23  5:47 AM   Result Value Ref Range    Sodium 131 (L) 133 - 143 mmol/L    Potassium 3.5 3.5 - 5.1 mmol/L    Chloride 94 (L) 101 - 110 mmol/L    CO2 28 21 - 32 mmol/L    Anion Gap 9 2 - 11 mmol/L    Glucose 112 (H) 65 - 100 mg/dL    BUN 6 (L) 8 - 23 MG/DL    Creatinine 0.60 0.6 - 1.0 MG/DL    Est, Glom Filt Rate >60 >60 ml/min/1.73m2    Calcium 8.5 8.3 - 10.4 MG/DL   Magnesium    Collection Time: 02/11/23  5:47 AM   Result Value Ref Range    Magnesium 2.1 1.8 - 2.4 mg/dL   PLEASE READ & DOCUMENT PPD TEST IN 24 HRS    Collection Time: 02/11/23  5:23 PM   Result Value Ref Range    PPD, (POC) Negative Negative    mm Induration 0 0 - 5 mm   CBC with Auto Differential    Collection Time: 02/12/23  5:04 AM   Result Value Ref Range    WBC 8.7 4.3 - 11.1 K/uL    RBC 3.96 (L) 4.05 - 5.2 M/uL    Hemoglobin 12.2 11.7 - 15.4 g/dL    Hematocrit 36.9 35.8 - 46.3 %    MCV 93.2 82 - 102 FL    MCH 30.8 26.1 - 32.9 PG    MCHC 33.1 31.4 - 35.0 g/dL    RDW 14.3 11.9 - 14.6 %    Platelets 832 001 - 518 K/uL    MPV 9.1 (L) 9.4 - 12.3 FL    nRBC 0.00 0.0 - 0.2 K/uL    Differential Type AUTOMATED      Seg Neutrophils 58 43 - 78 %    Lymphocytes 31 13 - 44 %    Monocytes 9 4.0 - 12.0 %    Eosinophils % 2 0.5 - 7.8 %    Basophils 0 0.0 - 2.0 %    Immature Granulocytes 0 0.0 - 5.0 %    Segs Absolute 4.9 1.7 - 8.2 K/UL    Absolute Lymph # 2.7 0.5 - 4.6 K/UL    Absolute Mono # 0.8 0.1 - 1.3 K/UL    Absolute Eos # 0.2 0.0 - 0.8 K/UL    Basophils Absolute 0.0 0.0 - 0.2 K/UL    Absolute Immature Granulocyte 0.0 0.0 - 0.5 K/UL   Basic Metabolic Panel w/ Reflex to MG    Collection Time: 02/12/23  5:04 AM   Result Value Ref Range    Sodium 128 (L) 133 - 143 mmol/L    Potassium 3.6 3.5 - 5.1 mmol/L    Chloride 93 (L) 101 - 110 mmol/L    CO2 29 21 - 32 mmol/L    Anion Gap 6 2 - 11 mmol/L    Glucose 98 65 - 100 mg/dL    BUN 7 (L) 8 - 23 MG/DL    Creatinine 0.70 0.6 - 1.0 MG/DL    Est, Glom Filt Rate >60 >60 ml/min/1.73m2    Calcium 8.5 8.3 - 10.4 MG/DL   Phosphorus    Collection Time: 02/12/23  5:04 AM   Result Value Ref Range    Phosphorus 2.4 2.3 - 3.7 MG/DL       I have personally reviewed imaging studies:  Other Studies:  XR CHEST PORTABLE   Final Result      Mild pulmonary edema with probable small bilateral pleural effusions. XR CHEST PORTABLE   Final Result      1. Cardiomegaly with mild pulmonary edema. Manda Chang M.D.    2/9/2023 10:56:00 PM      CT HEAD WO CONTRAST   Final Result      1. No acute intracranial abnormality visible by CT. MRI has greater sensitivity    for acute infarct. 2. Extensive chronic small vessel ischemic changes throughout the white matter    and possible chronic lacunar infarct in the right basal ganglia/internal    capsule. 3. No intracranial arterial occlusion or hemodynamically significant stenosis. 4. 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 5. Severe/critical atherosclerotic stenosis of the bilateral internal carotid    arteries in the neck, measuring 90% on the right and 70% on the left by NASCET    criteria. 6. Vertebral arteries are widely patent bilaterally. 7. Moderate atherosclerotic stenosis at the origins of both subclavian arteries. 8. Trace interstitial edema in the lung apices. 9. Mild inflammation in the paranasal sinuses. Discussed with Dr. Mike Quintero at 16 p.m. on 2/9/2023            This examination was interpreted by MERI Mccurdy M.D.    2/9/2023 10:20:00 PM      CTA HEAD NECK W CONTRAST   Final Result      1. No acute intracranial abnormality visible by CT. MRI has greater sensitivity    for acute infarct.    2. Extensive chronic small vessel ischemic changes throughout the white matter    and possible chronic lacunar infarct in the right basal ganglia/internal    capsule. 3. No intracranial arterial occlusion or hemodynamically significant stenosis. 4. 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 5. Severe/critical atherosclerotic stenosis of the bilateral internal carotid    arteries in the neck, measuring 90% on the right and 70% on the left by NASCET    criteria. 6. Vertebral arteries are widely patent bilaterally. 7. Moderate atherosclerotic stenosis at the origins of both subclavian arteries. 8. Trace interstitial edema in the lung apices. 9. Mild inflammation in the paranasal sinuses. Discussed with Dr. Yessenia Peña at 16 p.m. on 2/9/2023            This examination was interpreted by Manuel Romano M.D.        Manuel Romano M.D.    2/9/2023 10:20:00 PM      MRI brain without contrast    (Results Pending)       Current Meds:  Current Facility-Administered Medications   Medication Dose Route Frequency    LORazepam (ATIVAN) tablet 1 mg  1 mg Oral Nightly PRN    lisinopril (PRINIVIL;ZESTRIL) tablet 40 mg  40 mg Oral Daily    0.9 % sodium chloride infusion   IntraVENous Continuous    carbidopa-levodopa (SINEMET)  MG per tablet 0.5 tablet  0.5 tablet Oral TID    furosemide (LASIX) injection 40 mg  40 mg IntraVENous Daily    potassium chloride (KLOR-CON M) extended release tablet 40 mEq  40 mEq Oral Once    aspirin EC tablet 81 mg  81 mg Oral Daily    atorvastatin (LIPITOR) tablet 80 mg  80 mg Oral Nightly    citalopram (CELEXA) tablet 20 mg  20 mg Oral Daily    pantoprazole (PROTONIX) tablet 40 mg  40 mg Oral QAM AC    rivaroxaban (XARELTO) tablet 20 mg  20 mg Oral Dinner    spironolactone (ALDACTONE) tablet 25 mg  25 mg Oral Daily    ondansetron (ZOFRAN-ODT) disintegrating tablet 4 mg  4 mg Oral Q8H PRN    Or    ondansetron (ZOFRAN) injection 4 mg  4 mg IntraVENous Q6H PRN    polyethylene glycol (GLYCOLAX) packet 17 g  17 g Oral Daily PRN    acetaminophen (TYLENOL) tablet 650 mg  650 mg Oral Q4H PRN    Or    acetaminophen (TYLENOL) suppository 650 mg  650 mg Rectal Q4H PRN    labetalol (NORMODYNE;TRANDATE) injection 10 mg  10 mg IntraVENous Q10 Min PRN    magnesium sulfate 2000 mg in 50 mL IVPB premix  2,000 mg IntraVENous PRN    potassium chloride (KLOR-CON M) extended release tablet 40 mEq  40 mEq Oral PRN    Or    potassium bicarb-citric acid (EFFER-K) effervescent tablet 40 mEq  40 mEq Oral PRN    Or    potassium chloride 10 mEq/100 mL IVPB (Peripheral Line)  10 mEq IntraVENous PRN    0.9 % sodium chloride bolus  100 mL IntraVENous ONCE PRN       Signed:  Merced Lima MD    Part of this note may have been written by using a voice dictation software. The note has been proof read but may still contain some grammatical/other typographical errors.

## 2023-02-13 PROBLEM — I63.9 CEREBROVASCULAR ACCIDENT (CVA) (HCC): Status: ACTIVE | Noted: 2023-02-13

## 2023-02-13 LAB
ANION GAP SERPL CALC-SCNC: 10 MMOL/L (ref 2–11)
ANION GAP SERPL CALC-SCNC: 6 MMOL/L (ref 2–11)
BASOPHILS # BLD: 0 K/UL (ref 0–0.2)
BASOPHILS NFR BLD: 0 % (ref 0–2)
BUN SERPL-MCNC: 13 MG/DL (ref 8–23)
BUN SERPL-MCNC: 8 MG/DL (ref 8–23)
CALCIUM SERPL-MCNC: 8.8 MG/DL (ref 8.3–10.4)
CALCIUM SERPL-MCNC: 8.9 MG/DL (ref 8.3–10.4)
CHLORIDE SERPL-SCNC: 89 MMOL/L (ref 101–110)
CHLORIDE SERPL-SCNC: 90 MMOL/L (ref 101–110)
CO2 SERPL-SCNC: 24 MMOL/L (ref 21–32)
CO2 SERPL-SCNC: 29 MMOL/L (ref 21–32)
CREAT SERPL-MCNC: 0.5 MG/DL (ref 0.6–1)
CREAT SERPL-MCNC: 0.7 MG/DL (ref 0.6–1)
DIFFERENTIAL METHOD BLD: ABNORMAL
EOSINOPHIL # BLD: 0 K/UL (ref 0–0.8)
EOSINOPHIL NFR BLD: 0 % (ref 0.5–7.8)
ERYTHROCYTE [DISTWIDTH] IN BLOOD BY AUTOMATED COUNT: 13.7 % (ref 11.9–14.6)
GLUCOSE SERPL-MCNC: 105 MG/DL (ref 65–100)
GLUCOSE SERPL-MCNC: 112 MG/DL (ref 65–100)
HCT VFR BLD AUTO: 36.8 % (ref 35.8–46.3)
HGB BLD-MCNC: 12.6 G/DL (ref 11.7–15.4)
IMM GRANULOCYTES # BLD AUTO: 0 K/UL (ref 0–0.5)
IMM GRANULOCYTES NFR BLD AUTO: 0 % (ref 0–5)
LYMPHOCYTES # BLD: 2.4 K/UL (ref 0.5–4.6)
LYMPHOCYTES NFR BLD: 20 % (ref 13–44)
MAGNESIUM SERPL-MCNC: 1.2 MG/DL (ref 1.8–2.4)
MAGNESIUM SERPL-MCNC: 1.7 MG/DL (ref 1.8–2.4)
MCH RBC QN AUTO: 31.1 PG (ref 26.1–32.9)
MCHC RBC AUTO-ENTMCNC: 34.2 G/DL (ref 31.4–35)
MCV RBC AUTO: 90.9 FL (ref 82–102)
MONOCYTES # BLD: 0.9 K/UL (ref 0.1–1.3)
MONOCYTES NFR BLD: 8 % (ref 4–12)
NEUTS SEG # BLD: 8.3 K/UL (ref 1.7–8.2)
NEUTS SEG NFR BLD: 72 % (ref 43–78)
NRBC # BLD: 0 K/UL (ref 0–0.2)
OSMOLALITY SERPL: 265 MOSM/KG H2O (ref 280–301)
PHOSPHATE SERPL-MCNC: 2.3 MG/DL (ref 2.3–3.7)
PLATELET # BLD AUTO: 258 K/UL (ref 150–450)
PMV BLD AUTO: 9.7 FL (ref 9.4–12.3)
POTASSIUM SERPL-SCNC: 3.2 MMOL/L (ref 3.5–5.1)
POTASSIUM SERPL-SCNC: 3.5 MMOL/L (ref 3.5–5.1)
RBC # BLD AUTO: 4.05 M/UL (ref 4.05–5.2)
SODIUM SERPL-SCNC: 123 MMOL/L (ref 133–143)
SODIUM SERPL-SCNC: 125 MMOL/L (ref 133–143)
WBC # BLD AUTO: 11.7 K/UL (ref 4.3–11.1)

## 2023-02-13 PROCEDURE — 6370000000 HC RX 637 (ALT 250 FOR IP): Performed by: STUDENT IN AN ORGANIZED HEALTH CARE EDUCATION/TRAINING PROGRAM

## 2023-02-13 PROCEDURE — 6370000000 HC RX 637 (ALT 250 FOR IP): Performed by: PSYCHIATRY & NEUROLOGY

## 2023-02-13 PROCEDURE — 6370000000 HC RX 637 (ALT 250 FOR IP): Performed by: HOSPITALIST

## 2023-02-13 PROCEDURE — 6360000002 HC RX W HCPCS: Performed by: STUDENT IN AN ORGANIZED HEALTH CARE EDUCATION/TRAINING PROGRAM

## 2023-02-13 PROCEDURE — 85025 COMPLETE CBC W/AUTO DIFF WBC: CPT

## 2023-02-13 PROCEDURE — 1100000003 HC PRIVATE W/ TELEMETRY

## 2023-02-13 PROCEDURE — 83930 ASSAY OF BLOOD OSMOLALITY: CPT

## 2023-02-13 PROCEDURE — 6370000000 HC RX 637 (ALT 250 FOR IP): Performed by: INTERNAL MEDICINE

## 2023-02-13 PROCEDURE — 99232 SBSQ HOSP IP/OBS MODERATE 35: CPT | Performed by: PSYCHIATRY & NEUROLOGY

## 2023-02-13 PROCEDURE — 84100 ASSAY OF PHOSPHORUS: CPT

## 2023-02-13 PROCEDURE — 97535 SELF CARE MNGMENT TRAINING: CPT

## 2023-02-13 PROCEDURE — 6370000000 HC RX 637 (ALT 250 FOR IP): Performed by: FAMILY MEDICINE

## 2023-02-13 PROCEDURE — APPNB45 APP NON BILLABLE 31-45 MINUTES: Performed by: NURSE PRACTITIONER

## 2023-02-13 PROCEDURE — 80048 BASIC METABOLIC PNL TOTAL CA: CPT

## 2023-02-13 PROCEDURE — 97530 THERAPEUTIC ACTIVITIES: CPT

## 2023-02-13 PROCEDURE — 83735 ASSAY OF MAGNESIUM: CPT

## 2023-02-13 PROCEDURE — 36415 COLL VENOUS BLD VENIPUNCTURE: CPT

## 2023-02-13 RX ORDER — POTASSIUM CHLORIDE 20 MEQ/1
40 TABLET, EXTENDED RELEASE ORAL 2 TIMES DAILY WITH MEALS
Status: COMPLETED | OUTPATIENT
Start: 2023-02-13 | End: 2023-02-13

## 2023-02-13 RX ORDER — MAGNESIUM SULFATE IN WATER 40 MG/ML
2000 INJECTION, SOLUTION INTRAVENOUS ONCE
Status: COMPLETED | OUTPATIENT
Start: 2023-02-13 | End: 2023-02-13

## 2023-02-13 RX ORDER — AMLODIPINE BESYLATE 10 MG/1
10 TABLET ORAL DAILY
Status: DISCONTINUED | OUTPATIENT
Start: 2023-02-13 | End: 2023-02-16

## 2023-02-13 RX ORDER — LORAZEPAM 1 MG/1
2 TABLET ORAL ONCE
Status: COMPLETED | OUTPATIENT
Start: 2023-02-13 | End: 2023-02-13

## 2023-02-13 RX ADMIN — CARBIDOPA AND LEVODOPA 0.5 TABLET: 25; 100 TABLET ORAL at 08:41

## 2023-02-13 RX ADMIN — ACETAMINOPHEN 650 MG: 325 TABLET ORAL at 08:45

## 2023-02-13 RX ADMIN — ACETAMINOPHEN 650 MG: 325 TABLET ORAL at 20:31

## 2023-02-13 RX ADMIN — LORAZEPAM 1 MG: 1 TABLET ORAL at 20:29

## 2023-02-13 RX ADMIN — LISINOPRIL 40 MG: 20 TABLET ORAL at 08:41

## 2023-02-13 RX ADMIN — AMLODIPINE BESYLATE 10 MG: 10 TABLET ORAL at 08:41

## 2023-02-13 RX ADMIN — LORAZEPAM 2 MG: 1 TABLET ORAL at 04:01

## 2023-02-13 RX ADMIN — ATORVASTATIN CALCIUM 80 MG: 40 TABLET, FILM COATED ORAL at 20:26

## 2023-02-13 RX ADMIN — Medication 15 G: at 13:45

## 2023-02-13 RX ADMIN — CITALOPRAM HYDROBROMIDE 20 MG: 20 TABLET ORAL at 08:41

## 2023-02-13 RX ADMIN — SPIRONOLACTONE 25 MG: 25 TABLET ORAL at 08:41

## 2023-02-13 RX ADMIN — MAGNESIUM SULFATE HEPTAHYDRATE 2000 MG: 40 INJECTION, SOLUTION INTRAVENOUS at 08:40

## 2023-02-13 RX ADMIN — CARBIDOPA AND LEVODOPA 0.5 TABLET: 25; 100 TABLET ORAL at 13:47

## 2023-02-13 RX ADMIN — CARBIDOPA AND LEVODOPA 0.5 TABLET: 25; 100 TABLET ORAL at 20:30

## 2023-02-13 RX ADMIN — RIVAROXABAN 20 MG: 20 TABLET, FILM COATED ORAL at 17:08

## 2023-02-13 RX ADMIN — POTASSIUM CHLORIDE 40 MEQ: 1500 TABLET, EXTENDED RELEASE ORAL at 17:08

## 2023-02-13 RX ADMIN — POTASSIUM CHLORIDE 40 MEQ: 1500 TABLET, EXTENDED RELEASE ORAL at 08:41

## 2023-02-13 ASSESSMENT — PAIN SCALES - GENERAL
PAINLEVEL_OUTOF10: 4
PAINLEVEL_OUTOF10: 0

## 2023-02-13 ASSESSMENT — PAIN DESCRIPTION - RADICULAR PAIN: RADICULAR_PAIN: ABSENT

## 2023-02-13 ASSESSMENT — PAIN SCALES - WONG BAKER
WONGBAKER_NUMERICALRESPONSE: 4
WONGBAKER_NUMERICALRESPONSE: 0

## 2023-02-13 ASSESSMENT — PAIN DESCRIPTION - LOCATION: LOCATION: SHOULDER

## 2023-02-13 ASSESSMENT — PAIN DESCRIPTION - ORIENTATION: ORIENTATION: LEFT

## 2023-02-13 ASSESSMENT — PAIN DESCRIPTION - DESCRIPTORS: DESCRIPTORS: ACHING

## 2023-02-13 ASSESSMENT — PAIN - FUNCTIONAL ASSESSMENT: PAIN_FUNCTIONAL_ASSESSMENT: PREVENTS OR INTERFERES SOME ACTIVE ACTIVITIES AND ADLS

## 2023-02-13 NOTE — PROGRESS NOTES
Occupational Therapy Note:    Outside of 320 when chair alarm began going off. On entry with NSG, 2 family members moving to bathroom without proper equipment or IV pole management. Attempted re-education with pt family regarding pt current level of assist required and complication given IV line. Pt family agitated reporting staff does not usually respond, therapist point out pt nor pt family called for assistance. Pt family acknowledged and responded \"well you need to go tell staff\". NSG with family and pt. Charge nurse and pt nurse notified.      Thank you,    Roger Haque, OT    Rehab Caseload Tracker

## 2023-02-13 NOTE — PLAN OF CARE
Problem: Discharge Planning  Goal: Discharge to home or other facility with appropriate resources  Outcome: Progressing  Flowsheets (Taken 2/12/2023 2015)  Discharge to home or other facility with appropriate resources: Identify barriers to discharge with patient and caregiver     Problem: Skin/Tissue Integrity  Goal: Absence of new skin breakdown  Description: 1. Monitor for areas of redness and/or skin breakdown  2. Assess vascular access sites hourly  3. Every 4-6 hours minimum:  Change oxygen saturation probe site  4. Every 4-6 hours:  If on nasal continuous positive airway pressure, respiratory therapy assess nares and determine need for appliance change or resting period. Outcome: Progressing     Problem: Safety - Adult  Goal: Free from fall injury  Outcome: Progressing     Problem: ABCDS Injury Assessment  Goal: Absence of physical injury  Outcome: Progressing     Problem: Confusion  Goal: Confusion, delirium, dementia, or psychosis is improved or at baseline  Description: INTERVENTIONS:  1. Assess for possible contributors to thought disturbance, including medications, impaired vision or hearing, underlying metabolic abnormalities, dehydration, psychiatric diagnoses, and notify attending LIP  2. [place] high risk fall precautions, as indicated  3. Provide frequent short contacts to provide reality reorientation, refocusing and direction  4. Decrease environmental stimuli, including noise as appropriate  5. Monitor and intervene to maintain adequate nutrition, hydration, elimination, sleep and activity  6. If unable to ensure safety without constant attention obtain sitter and review sitter guidelines with assigned personnel  7.  Initiate Psychosocial CNS and Spiritual Care consult, as indicated  Outcome: Progressing  Flowsheets (Taken 2/12/2023 2015)  Effect of thought disturbance (confusion, delirium, dementia, or psychosis) are managed with adequate functional status:   Assess for contributors to thought disturbance, including medications, impaired vision or hearing, underlying metabolic abnormalities, dehydration, psychiatric diagnoses, notify [facility] Cardo[place] high risk fall precautions, as indicated   Provide frequent short contacts to provide reality reorientation, refocusing and direction   Decrease environmental stimuli, including noise as appropriate   Monitor and intervene to maintain adequate nutrition, hydration, elimination, sleep and activity     Problem: Neurosensory - Adult  Goal: Achieves stable or improved neurological status  Outcome: Progressing  Goal: Achieves maximal functionality and self care  Outcome: Progressing

## 2023-02-13 NOTE — PROGRESS NOTES
Hospitalist Progress Note   Admit Date:  2023  9:26 PM   Name:  Yolanda Naranjo   Age:  81 y.o. Sex:  female  :  1940   MRN:  [id number]   Room:  [facility]/01    Presenting Complaint: Cerebrovascular Accident     Reason(s) for Admission: Stroke-like symptoms [R29.90]  Cerebrovascular accident (CVA), unspecified mechanism [facility]) [I63.9]     Hospital Course:   Yolanda Naranjo is a 81 y.o. female with medical history of AFIB on xarelto, SSS, prior CVA s/p tpa with hemorrhagic converstion in , BL COSME, GERD who presented from home via EMS with acute onset aphasia that started 2 hours PTA. She was drinking water and it spilled out of her mouth and then family noticied she had difficulty speaking. Noted to have some confusion while en route with EMS and on arrival. CODE stroke called and she was Evaluated by tele-neurology not a candidate for tpa on DOAC     Started having cold like symptoms last month and since then has had poor intact, dry cough and noticied SOB today. While in ED hypoxic with increasing oxygen requirements. Very agitated, restless and anxious. Per daughter at beside takes ativan 2 mg and metalonin at bedtime. Sna 127, K 2.9   CTH and CTA head and neck       1. No acute intracranial abnormality visible by CT. MRI has greater sensitivity    for acute infarct. 2. Extensive chronic small vessel ischemic changes throughout the white matter    and possible chronic lacunar infarct in the right basal ganglia/internal    capsule. 3. No intracranial arterial occlusion or hemodynamically significant stenosis. 4. 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 5. Severe/critical atherosclerotic stenosis of the bilateral internal carotid    arteries in the neck, measuring 90% on the right and 70% on the left by NASCET    criteria. 6. Vertebral arteries are widely patent bilaterally.    7. Moderate atherosclerotic stenosis at the origins of both subclavian arteries. 8. Trace interstitial edema in the lung apices. 9. Mild inflammation in the paranasal sinuses. Rec'd labetalol 10 mg IV in ED         Subjective & 24hr Events (02/13/23): Patient was seen and examined at the bedside. Daughter at the bedside. She is off oxygen and is on RA. She was laying down on the bed and confused. She was staring at me. Assessment & Plan:   Stroke-like symptoms - Ischemic CVA  r/o   Home antiplatelets/anticoagulation: xarelto 20   Continue Atorvastatin 80 mg  Discontinued plavix and ASA 81  -Unable to obtain MRI brain without contrast as pt is unable to stay still due to parkinsonism and tremors. Awaiting MRI with sedation on 2/13   TTE with bubble with EF 55% to 60%  patient is at high risk for neurological complications in the setting of carotid artery surgical intervention  vascular surgery following     Newly diagnosed Parkinsonism with  cognitive impairment. Clinically diagnosed with PD. Degree of bradykinesia, constipation, masking of the face and right arm tremor and recurrent falls  Continue  sinemet  Neurology following      Acute hypoxic respiratory failiure -   Resolved and is on RA   Leucocytosis resolved    Chronic Hyponatremia -   Aldactone and Celexa contributed to worsening hyponatremia  Currently Sodium is 123. Holding  Aldactone   Added Urea        Hypokalemia/ Hypomagnesemia  Resolved      PAFIB // SSS - saw cardiology on 12/14 and referred to EP for PPM consideration and saw on 12/27 with plans to monitor symptoms and re-assess in 1 year. Cont. Xarelto       MR/pHTN - followed by [facility] cardiology      VIRY - cont citalopram. cont alternative agent. PT/OT recommended IRF    Diet:  ADULT DIET;  Regular  ADULT ORAL NUTRITION SUPPLEMENT; Breakfast, Lunch, Dinner; Standard High Calorie/High Protein Oral Supplement  DVT PPx: Lovenox   Code status: Full Code    Hospital Problems:  Principal Problem: Stroke-like symptoms  Active Problems:    Hyponatremia    Hypokalemia    Acute respiratory failure with hypoxia (HCC)    Acute metabolic encephalopathy    Cerebrovascular accident (CVA) (HCC)    Primary insomnia    Long term current use of anticoagulant    Generalized anxiety disorder    Gastroesophageal reflux disease without esophagitis    Pulmonary hypertension (HCC)    SSS (sick sinus syndrome) (HCC)    Permanent atrial fibrillation (HCC)    Mitral valve regurgitation    History of CVA (cerebrovascular accident)    Bilateral carotid artery disease (HCC)    Pure hypercholesterolemia    Essential tremor  Resolved Problems:    * No resolved hospital problems. *      Objective:   Patient Vitals for the past 24 hrs:   Temp Pulse Resp BP SpO2   02/13/23 1238 97.3 °F (36.3 °C) 76 18 (!) 140/75 94 %   02/13/23 0719 97.7 °F (36.5 °C) 85 16 (!) 156/59 96 %   02/13/23 0441 98.7 °F (37.1 °C) 100 18 (!) 161/109 94 %   02/13/23 0104 97.9 °F (36.6 °C) 97 19 (!) 185/83 97 %   02/12/23 2215 -- -- -- (!) 196/108 --   02/12/23 2121 97.7 °F (36.5 °C) 96 19 (!) 205/136 98 %   02/12/23 2015 -- 99 -- -- --   02/12/23 1600 97.5 °F (36.4 °C) 72 16 (!) 172/79 98 %       Oxygen Therapy  SpO2: 94 %  Pulse Oximeter Device Mode: Intermittent  O2 Device: None (Room air)  O2 Flow Rate (L/min): 2 L/min    Estimated body mass index is 29.12 kg/m² as calculated from the following:    Height as of this encounter: 5' 1\" (1.549 m).    Weight as of this encounter: 154 lb 1.6 oz (69.9 kg).    Intake/Output Summary (Last 24 hours) at 2/13/2023 1428  Last data filed at 2/12/2023 1507  Gross per 24 hour   Intake 250 ml   Output --   Net 250 ml           Physical Exam:     Blood pressure (!) 140/75, pulse 76, temperature 97.3 °F (36.3 °C), temperature source Axillary, resp. rate 18, height 5' 1\" (1.549 m), weight 154 lb 1.6 oz (69.9 kg), SpO2 94 %.  General:    Well nourished.    Head:  Normocephalic, atraumatic  Eyes:  Sclerae appear normal.  Pupils  equally round. ENT:  Nares appear normal.  Moist oral mucosa  Neck:  No restricted ROM. Trachea midline   CV:   RRR. No m/r/g. No jugular venous distension. Lungs:   CTAB. No wheezing, rhonchi, or rales. Symmetric expansion. Abdomen:   Soft, nontender, nondistended. Extremities: No cyanosis or clubbing. No edema  Skin:     No rashes and normal coloration. Warm and dry. Neuro:  CN II-XII grossly intact. Psych:  Normal mood and affect.       I have personally reviewed labs and tests:  Recent Labs:  Recent Results (from the past 48 hour(s))   PLEASE READ & DOCUMENT PPD TEST IN 24 HRS    Collection Time: 02/11/23  5:23 PM   Result Value Ref Range    PPD, (POC) Negative Negative    mm Induration 0 0 - 5 mm   CBC with Auto Differential    Collection Time: 02/12/23  5:04 AM   Result Value Ref Range    WBC 8.7 4.3 - 11.1 K/uL    RBC 3.96 (L) 4.05 - 5.2 M/uL    Hemoglobin 12.2 11.7 - 15.4 g/dL    Hematocrit 36.9 35.8 - 46.3 %    MCV 93.2 82 - 102 FL    MCH 30.8 26.1 - 32.9 PG    MCHC 33.1 31.4 - 35.0 g/dL    RDW 14.3 11.9 - 14.6 %    Platelets 327 341 - 009 K/uL    MPV 9.1 (L) 9.4 - 12.3 FL    nRBC 0.00 0.0 - 0.2 K/uL    Differential Type AUTOMATED      Seg Neutrophils 58 43 - 78 %    Lymphocytes 31 13 - 44 %    Monocytes 9 4.0 - 12.0 %    Eosinophils % 2 0.5 - 7.8 %    Basophils 0 0.0 - 2.0 %    Immature Granulocytes 0 0.0 - 5.0 %    Segs Absolute 4.9 1.7 - 8.2 K/UL    Absolute Lymph # 2.7 0.5 - 4.6 K/UL    Absolute Mono # 0.8 0.1 - 1.3 K/UL    Absolute Eos # 0.2 0.0 - 0.8 K/UL    Basophils Absolute 0.0 0.0 - 0.2 K/UL    Absolute Immature Granulocyte 0.0 0.0 - 0.5 K/UL   Basic Metabolic Panel w/ Reflex to MG    Collection Time: 02/12/23  5:04 AM   Result Value Ref Range    Sodium 128 (L) 133 - 143 mmol/L    Potassium 3.6 3.5 - 5.1 mmol/L    Chloride 93 (L) 101 - 110 mmol/L    CO2 29 21 - 32 mmol/L    Anion Gap 6 2 - 11 mmol/L    Glucose 98 65 - 100 mg/dL    BUN 7 (L) 8 - 23 MG/DL    Creatinine 0.70 0.6 - 1.0 MG/DL    Est, Glom Filt Rate >60 >60 ml/min/1.73m2    Calcium 8.5 8.3 - 10.4 MG/DL   Phosphorus    Collection Time: 02/12/23  5:04 AM   Result Value Ref Range    Phosphorus 2.4 2.3 - 3.7 MG/DL   PLEASE READ & DOCUMENT PPD TEST IN 48 HRS    Collection Time: 02/12/23  5:23 PM   Result Value Ref Range    PPD, (POC) Negative Negative    mm Induration 0 0 - 5 mm   CBC with Auto Differential    Collection Time: 02/13/23  6:42 AM   Result Value Ref Range    WBC 11.7 (H) 4.3 - 11.1 K/uL    RBC 4.05 4.05 - 5.2 M/uL    Hemoglobin 12.6 11.7 - 15.4 g/dL    Hematocrit 36.8 35.8 - 46.3 %    MCV 90.9 82 - 102 FL    MCH 31.1 26.1 - 32.9 PG    MCHC 34.2 31.4 - 35.0 g/dL    RDW 13.7 11.9 - 14.6 %    Platelets 142 992 - 878 K/uL    MPV 9.7 9.4 - 12.3 FL    nRBC 0.00 0.0 - 0.2 K/uL    Differential Type AUTOMATED      Seg Neutrophils 72 43 - 78 %    Lymphocytes 20 13 - 44 %    Monocytes 8 4.0 - 12.0 %    Eosinophils % 0 (L) 0.5 - 7.8 %    Basophils 0 0.0 - 2.0 %    Immature Granulocytes 0 0.0 - 5.0 %    Segs Absolute 8.3 (H) 1.7 - 8.2 K/UL    Absolute Lymph # 2.4 0.5 - 4.6 K/UL    Absolute Mono # 0.9 0.1 - 1.3 K/UL    Absolute Eos # 0.0 0.0 - 0.8 K/UL    Basophils Absolute 0.0 0.0 - 0.2 K/UL    Absolute Immature Granulocyte 0.0 0.0 - 0.5 K/UL   Basic Metabolic Panel w/ Reflex to MG    Collection Time: 02/13/23  6:42 AM   Result Value Ref Range    Sodium 123 (L) 133 - 143 mmol/L    Potassium 3.2 (L) 3.5 - 5.1 mmol/L    Chloride 89 (L) 101 - 110 mmol/L    CO2 24 21 - 32 mmol/L    Anion Gap 10 2 - 11 mmol/L    Glucose 112 (H) 65 - 100 mg/dL    BUN 8 8 - 23 MG/DL    Creatinine 0.50 (L) 0.6 - 1.0 MG/DL    Est, Glom Filt Rate >60 >60 ml/min/1.73m2    Calcium 8.8 8.3 - 10.4 MG/DL   Phosphorus    Collection Time: 02/13/23  6:42 AM   Result Value Ref Range    Phosphorus 2.3 2.3 - 3.7 MG/DL   Magnesium    Collection Time: 02/13/23  6:42 AM   Result Value Ref Range    Magnesium 1.2 (L) 1.8 - 2.4 mg/dL       I have personally reviewed imaging studies:  Other Studies:  XR CHEST PORTABLE   Final Result      Mild pulmonary edema with probable small bilateral pleural effusions. XR CHEST PORTABLE   Final Result      1. Cardiomegaly with mild pulmonary edema. Goyo Goldstein M.D.    2/9/2023 10:56:00 PM      CT HEAD WO CONTRAST   Final Result      1. No acute intracranial abnormality visible by CT. MRI has greater sensitivity    for acute infarct. 2. Extensive chronic small vessel ischemic changes throughout the white matter    and possible chronic lacunar infarct in the right basal ganglia/internal    capsule. 3. No intracranial arterial occlusion or hemodynamically significant stenosis. 4. 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 5. Severe/critical atherosclerotic stenosis of the bilateral internal carotid    arteries in the neck, measuring 90% on the right and 70% on the left by NASCET    criteria. 6. Vertebral arteries are widely patent bilaterally. 7. Moderate atherosclerotic stenosis at the origins of both subclavian arteries. 8. Trace interstitial edema in the lung apices. 9. Mild inflammation in the paranasal sinuses. Discussed with Dr. Dawood Platt at 16 p.m. on 2/9/2023            This examination was interpreted by MERI Baird M.D.    2/9/2023 10:20:00 PM      CTA HEAD NECK W CONTRAST   Final Result      1. No acute intracranial abnormality visible by CT. MRI has greater sensitivity    for acute infarct. 2. Extensive chronic small vessel ischemic changes throughout the white matter    and possible chronic lacunar infarct in the right basal ganglia/internal    capsule. 3. No intracranial arterial occlusion or hemodynamically significant stenosis. 4. 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    5. Severe/critical atherosclerotic stenosis of the bilateral internal carotid    arteries in the neck, measuring 90% on the right and 70% on the left by NASCET    criteria. 6. Vertebral arteries are widely patent bilaterally. 7. Moderate atherosclerotic stenosis at the origins of both subclavian arteries. 8. Trace interstitial edema in the lung apices. 9. Mild inflammation in the paranasal sinuses. Discussed with Dr. Katherin Bell at 16 p.m. on 2/9/2023            This examination was interpreted by Matthew De Los Santos M.D.        Matthew De Los Santos M.D.    2/9/2023 10:20:00 PM      MRI brain without contrast    (Results Pending)       Current Meds:  Current Facility-Administered Medications   Medication Dose Route Frequency    amLODIPine (NORVASC) tablet 10 mg  10 mg Oral Daily    potassium chloride (KLOR-CON M) extended release tablet 40 mEq  40 mEq Oral BID WC    urea (URE-NA) packet 15 g  15 g Oral Daily    LORazepam (ATIVAN) tablet 1 mg  1 mg Oral Nightly PRN    lisinopril (PRINIVIL;ZESTRIL) tablet 40 mg  40 mg Oral Daily    carbidopa-levodopa (SINEMET)  MG per tablet 0.5 tablet  0.5 tablet Oral TID    [Held by provider] furosemide (LASIX) injection 40 mg  40 mg IntraVENous Daily    [Held by provider] aspirin EC tablet 81 mg  81 mg Oral Daily    atorvastatin (LIPITOR) tablet 80 mg  80 mg Oral Nightly    citalopram (CELEXA) tablet 20 mg  20 mg Oral Daily    pantoprazole (PROTONIX) tablet 40 mg  40 mg Oral QAM AC    rivaroxaban (XARELTO) tablet 20 mg  20 mg Oral Dinner    spironolactone (ALDACTONE) tablet 25 mg  25 mg Oral Daily    ondansetron (ZOFRAN-ODT) disintegrating tablet 4 mg  4 mg Oral Q8H PRN    Or    ondansetron (ZOFRAN) injection 4 mg  4 mg IntraVENous Q6H PRN    polyethylene glycol (GLYCOLAX) packet 17 g  17 g Oral Daily PRN    acetaminophen (TYLENOL) tablet 650 mg  650 mg Oral Q4H PRN    Or    acetaminophen (TYLENOL) suppository 650 mg  650 mg Rectal Q4H PRN    labetalol (NORMODYNE;TRANDATE) injection 10 mg  10 mg IntraVENous Q10 Min PRN    magnesium sulfate 2000 mg in 50 mL IVPB premix  2,000 mg IntraVENous PRN    potassium chloride (KLOR-CON M) extended release tablet 40 mEq  40 mEq Oral PRN    Or    potassium bicarb-citric acid (EFFER-K) effervescent tablet 40 mEq  40 mEq Oral PRN    Or    potassium chloride 10 mEq/100 mL IVPB (Peripheral Line)  10 mEq IntraVENous PRN    0.9 % sodium chloride bolus  100 mL IntraVENous ONCE PRN       Signed:  Cass Cheema MD    Part of this note may have been written by using a voice dictation software. The note has been proof read but may still contain some grammatical/other typographical errors.

## 2023-02-13 NOTE — PROGRESS NOTES
ACUTE PHYSICAL THERAPY GOALS:   (Developed with and agreed upon by patient and/or caregiver. )  LTG:  (1.)Ms. Gresham will move from supine to sit and sit to supine , scoot up and down, and roll side to side in bed with CONTACT GUARD ASSIST within 7 treatment day(s). (2.)Ms. Gresham will transfer from bed to chair and chair to bed with CONTACT GUARD ASSIST using the least restrictive device within 7 treatment day(s). (3.)Ms. Gresham will ambulate with CONTACT GUARD ASSIST for 50 feet with the least restrictive device within 7 treatment day(s). (4.)Ms. Gresham will tolerate at least 23 min of dynamic standing activity to assist standing ADLs with the least restrictive device within 7 treatment days. PHYSICAL THERAPY: Daily Note PM   (Link to Caseload Tracking: PT Visit Days : 2  Time In/Out PT Charge Capture  Rehab Caseload Tracker  Orders    Pily Ceja is a 81 y.o. female   PRIMARY DIAGNOSIS: Stroke-like symptoms  Stroke-like symptoms [R29.90]  Cerebrovascular accident (CVA), unspecified mechanism ([facility] [address] [I63.9]       Inpatient: Payor: MEDICARE / Plan: MEDICARE PART A AND B / Product Type: *No Product type* /     ASSESSMENT:     REHAB RECOMMENDATIONS:   Recommendation to date pending progress:  Setting:  Inpatient Rehab Facility    Equipment:    To Be Determined     ASSESSMENT:  Ms. Clyde Bryant presents in supine, lethargic but agreeable to session. Upon entering, Pnt is agreeable to PT treatment. she reports no pain  at rest. Pnt performed supine > sit with min Ax2, sitting EOB with good sitting balance control. Sit > stand with min Ax2 using RW. Gait is steps alongside the bed, then x 6 ft with min Ax2, cues for step length. Gait is noted to be slow and shuffled. Stand > sit with min Ax2, followed by positioning for comfort. At end of session pt up in bedside chair with all needs within reach, alarm activated for safety, RN notified.  Overall, good progress today as pnt improved in activity tolerance. Pnt continues to present as functioning below her baseline, with deficits in mobility including transfers, gait, balance, and activity tolerance. Pt will continue to benefit from skilled therapy services to address stated deficits to promote return to highest level of function, independence, and safety. Will continue to follow.        SUBJECTIVE:   Ms. Bailey Vallejo states, \"Hi there\"     Social/Functional Lives With: Spouse  Type of Home: House  Bathroom Equipment: Grab bars in shower  Home Equipment: [address], [address] Help From: Family  ADL Assistance: Independent  Homemaking Assistance: Independent  Ambulation Assistance: Independent  Transfer Assistance: Independent  Active : No  Patient's  Info: Spouse drives  Mode of Transportation: Car  OBJECTIVE:     PAIN: Vincent Otter / O2: PRECAUTION / Cody Cassy / Velvet Mix:   Pre Treatment: 0         Post Treatment: 0 Vitals        Oxygen    IV    RESTRICTIONS/PRECAUTIONS:        MOBILITY: I Mod I S SBA CGA Min Mod Max Total  NT x2 Comments:   Bed Mobility    Rolling [] [] [] [] [] [x] [] [] [] [] [x]    Supine to Sit [] [] [] [] [] [x] [] [] [] [] [x]    Scooting [] [] [] [] [] [x] [] [] [] [] [x]    Sit to Supine [] [] [] [] [] [] [] [] [] [x] []    Transfers    Sit to Stand [] [] [] [] [] [x] [] [] [] [] [x]    Bed to Chair [] [] [] [] [] [x] [] [] [] [] [x]    Stand to Sit [] [] [] [] [] [x] [] [] [] [] [x]     [] [] [] [] [] [] [] [] [] [] []    I=Independent, Mod I=Modified Independent, S=Supervision, SBA=Standby Assistance, CGA=Contact Guard Assistance,   Min=Minimal Assistance, Mod=Moderate Assistance, Max=Maximal Assistance, Total=Total Assistance, NT=Not Tested    BALANCE: Good Fair+ Fair Fair- Poor NT Comments   Sitting Static [x] [] [] [] [] []    Sitting Dynamic [x] [] [] [] [] []              Standing Static [] [x] [] [] [] []    Standing Dynamic [] [x] [] [] [] []      GAIT: I Mod I S SBA CGA Min Mod Max Total  NT x2 Comments:   Level of Assistance [] [] [] [] [] [x] [] [] [] [] [x]    Distance   6 feet    DME Gait Belt and Rolling Walker    Gait Quality Decreased selma , Decreased step clearance, Decreased step length, and Decreased stance    Weightbearing Status      Stairs      I=Independent, Mod I=Modified Independent, S=Supervision, SBA=Standby Assistance, CGA=Contact Guard Assistance,   Min=Minimal Assistance, Mod=Moderate Assistance, Max=Maximal Assistance, Total=Total Assistance, NT=Not Tested    PLAN:   FREQUENCY AND DURATION: 3 times/week for duration of hospital stay or until stated goals are met, whichever comes first.    TREATMENT:   TREATMENT:   Co-Treatment PT/OT necessary due to patient's decreased overall endurance/tolerance levels, as well as need for high level skilled assistance to complete functional transfers/mobility and functional tasks  Therapeutic Activity (10 Minutes): Therapeutic activity included Rolling, Supine to Sit, Scooting, Transfer Training, Ambulation on level ground, Sitting balance , and Standing balance to improve functional Activity tolerance, Balance, Coordination, Mobility, Strength, and ROM.     TREATMENT GRID:  N/A    AFTER TREATMENT PRECAUTIONS: Alarm Activated, Chair, Needs within reach, and RN notified    INTERDISCIPLINARY COLLABORATION:  RN/ PCT, PT/ PTA, and OT/ LOPEZ    EDUCATION: Education Given To: Patient    TIME IN/OUT:  Time In: 1510  Time Out: [address]  Minutes: Ambrose Poe 1634, PT

## 2023-02-13 NOTE — CONSULTS
GEN GENERIC H&P/CONSULT    Subjective:     81 y.o. female with medical history of AFIB, CVA, carotid stenosis, HTN  She presented with aphasia, confusion, SOB with hypoxia    CT scan of head neg for acute changes   Chest xray: lung congestion   Renal requested for hyponatremia    Latest Reference Range & Units 2/9/23 21:37 2/10/23 05:03 2/10/23 14:53 2/10/23 20:03 2/11/23 05:47 2/12/23 05:04 2/13/23 06:42   Sodium 133 - 143 mmol/L 127 (L) 128 (L) 131 (L) 128 (L) 131 (L) 128 (L) 123 (L)      Latest Reference Range & Units 2/13/23 06:42   Sodium 133 - 143 mmol/L 123 (L)   Potassium 3.5 - 5.1 mmol/L 3.2 (L)   Chloride 101 - 110 mmol/L 89 (L)   CO2 21 - 32 mmol/L 24   BUN,BUNPL 8 - 23 MG/DL 8   Creatinine 0.6 - 1.0 MG/DL 0.50 (L)   Anion Gap 2 - 11 mmol/L 10   Est, Glom Filt Rate >60 ml/min/1.73m2 >60   Magnesium 1.8 - 2.4 mg/dL 1.2 (L)   Glucose, Random 65 - 100 mg/dL 112 (H)   CALCIUM, SERUM, 108932 8.3 - 10.4 MG/DL 8.8   Phosphorus 2.3 - 3.7 MG/DL 2.3      Latest Reference Range & Units 2/10/23 00:45   Creatinine, Ur mg/dL 13.00   Osmolality, Ur 50 - 1400 MOSM/kg H2O 318   SODIUM, RANDOM URINE MMOL/L 124     Past Medical History:   Diagnosis Date    Carotid stenosis     With infarct    Cerebral vascular accident ([address] 07/2015    CVA (cerebrovascular accident) ([address]     Depression     GERD (gastroesophageal reflux disease)     Hypercholesterolemia     Hypertension     Mitral valve regurgitation     Paroxysmal atrial fibrillation ([address]     Pulmonary hypertension (HCC)     SSS (sick sinus syndrome) ([address]     Tachycardia-bradycardia    Stroke ([facility] 2015      Past Surgical History:   Procedure Laterality Date    COLONOSCOPY  2011    GYN      sharonda--years ago    ORTHOPEDIC SURGERY  2013    left total knee      Prior to Admission medications    Medication Sig Start Date End Date Taking? Authorizing Provider   LORazepam (ATIVAN) 1 MG tablet Take 1 mg by mouth as needed.     Historical Provider, MD   mirtazapine (REMERON) 7.5 MG tablet Take 1 tablet by mouth nightly  Patient not taking: Reported on 2/10/2023 1/13/23   Julian Dao MD   citalopram (CELEXA) 20 MG tablet Take 1 tablet by mouth daily 12/9/22   Julian Dao MD   omeprazole (PRILOSEC) 20 MG delayed release capsule Take 1 capsule by mouth daily 12/9/22   Julian Dao MD   amLODIPine (NORVASC) 5 MG tablet Take 5 mg by mouth daily 3/28/22   Ar Automatic Reconciliation   ascorbic acid (VITAMIN C) 250 MG tablet Take by mouth    Ar Automatic Reconciliation   aspirin 81 MG EC tablet Take by mouth daily    Ar Automatic Reconciliation   atorvastatin (LIPITOR) 40 MG tablet Take 40 mg by mouth daily 12/9/21   Ar Automatic Reconciliation   lisinopril (PRINIVIL;ZESTRIL) 20 MG tablet Take 20 mg by mouth 2 times daily 3/28/22   Ar Automatic Reconciliation   rivaroxaban (XARELTO) 20 MG TABS tablet Take 20 mg by mouth Daily with supper 3/28/22   Ar Automatic Reconciliation   spironolactone (ALDACTONE) 25 MG tablet Take 25 mg by mouth daily 3/28/22   Ar Automatic Reconciliation     No Known Allergies   Social History     Tobacco Use    Smoking status: Never    Smokeless tobacco: Never   Substance Use Topics    Alcohol use: No     Alcohol/week: 0.0 standard drinks      Family History   Problem Relation Age of Onset    Stroke Mother     Heart Disease Father           Review of Systems    As abpve     Objective:       BP (!) 156/59   Pulse 85   Temp 97.7 °F (36.5 °C) (Axillary)   Resp 16   Ht 5' 1\" (1.549 m)   Wt 154 lb 1.6 oz (69.9 kg)   SpO2 96%   BMI 29.12 kg/m²     No intake/output data recorded.   02/11 1901 - 02/13 0700  In: 250 [I.V.:250]  Out: -     PE:     Alert  Not in OLGA  Lung: clear  CV: RR, no M , no rub, JVD +   Abd: soft, not tender  Ext: no edema       Data Review:     Recent Results (from the past 24 hour(s))   PLEASE READ & DOCUMENT PPD TEST IN 48 HRS    Collection Time: 02/12/23  5:23 PM   Result Value Ref Range    PPD, (POC) Negative Negative    mm Induration 0 0 - 5 mm   CBC with Auto Differential    Collection Time: 02/13/23  6:42 AM   Result Value Ref Range    WBC 11.7 (H) 4.3 - 11.1 K/uL    RBC 4.05 4.05 - 5.2 M/uL    Hemoglobin 12.6 11.7 - 15.4 g/dL    Hematocrit 36.8 35.8 - 46.3 %    MCV 90.9 82 - 102 FL    MCH 31.1 26.1 - 32.9 PG    MCHC 34.2 31.4 - 35.0 g/dL    RDW 13.7 11.9 - 14.6 %    Platelets 941 460 - 065 K/uL    MPV 9.7 9.4 - 12.3 FL    nRBC 0.00 0.0 - 0.2 K/uL    Differential Type AUTOMATED      Seg Neutrophils 72 43 - 78 %    Lymphocytes 20 13 - 44 %    Monocytes 8 4.0 - 12.0 %    Eosinophils % 0 (L) 0.5 - 7.8 %    Basophils 0 0.0 - 2.0 %    Immature Granulocytes 0 0.0 - 5.0 %    Segs Absolute 8.3 (H) 1.7 - 8.2 K/UL    Absolute Lymph # 2.4 0.5 - 4.6 K/UL    Absolute Mono # 0.9 0.1 - 1.3 K/UL    Absolute Eos # 0.0 0.0 - 0.8 K/UL    Basophils Absolute 0.0 0.0 - 0.2 K/UL    Absolute Immature Granulocyte 0.0 0.0 - 0.5 K/UL   Basic Metabolic Panel w/ Reflex to MG    Collection Time: 02/13/23  6:42 AM   Result Value Ref Range    Sodium 123 (L) 133 - 143 mmol/L    Potassium 3.2 (L) 3.5 - 5.1 mmol/L    Chloride 89 (L) 101 - 110 mmol/L    CO2 24 21 - 32 mmol/L    Anion Gap 10 2 - 11 mmol/L    Glucose 112 (H) 65 - 100 mg/dL    BUN 8 8 - 23 MG/DL    Creatinine 0.50 (L) 0.6 - 1.0 MG/DL    Est, Glom Filt Rate >60 >60 ml/min/1.73m2    Calcium 8.8 8.3 - 10.4 MG/DL   Phosphorus    Collection Time: 02/13/23  6:42 AM   Result Value Ref Range    Phosphorus 2.3 2.3 - 3.7 MG/DL   Magnesium    Collection Time: 02/13/23  6:42 AM   Result Value Ref Range    Magnesium 1.2 (L) 1.8 - 2.4 mg/dL           Assessment:     Principal Problem:    Stroke-like symptoms  Active Problems:    Hyponatremia    Hypokalemia    Acute respiratory failure with hypoxia (HCC)    Acute metabolic encephalopathy    C[facility]brovascular accident (CVA) ([facility]    Primary insomnia    Long term current use of anticoagulant    Generalized anxiety disorder    Gastroesophageal reflux disease without esophagitis    Pulmonary hypertension (HCC)    SSS (sick sinus syndrome) (HCC)    Permanent atrial fibrillation (HCC)    Mitral valve regurgitation    History of CVA (cerebrovascular accident)    Bilateral carotid artery disease ([address]    Pure hypercholesterolemia    Essential tremor  Resolved Problems:    * No resolved hospital problems. *      Plan:     -Chronic hyponatremia: Check measured serum osmolality   Volume expanded. High urine Na: on Aldactone,   Hyponatremia could be exacerbated with Aldactone and Celexa, hypokalemia. Hold Aldactone   Add Urea     - Hypokalemia, hypomagnesemia: supplement     -CVA: may be recurrent, for MRI today    - A.  Fib    -HTN     Thanks  Dr Placido Osei

## 2023-02-13 NOTE — PROGRESS NOTES
ACUTE OCCUPATIONAL THERAPY GOALS:   (Developed with and agreed upon by patient and/or caregiver.)  1. Patient will complete lower body bathing and dressing with SBA and adaptive equipment as   needed. 2. Patient will completed upper body bathing and dressing with Mod I and adaptive equipment as needed. 3. Patient will complete grooming standing at sink with SBA and adaptive equipment as needed. 4. Patient will complete toileting with CGA and adaptive equipment as needed. 5. Patient will tolerate 30 minutes of OT treatment with 1-2 rest breaks to increase activity tolerance for ADLs. 6. Patient will complete functional transfers with CGA and adaptive equipment as needed. Timeframe: 7 visits     OCCUPATIONAL THERAPY: Daily Note PM   OT Visit Days: 2   Time In/Out  OT Charge Capture  Rehab Caseload Tracker  OT Orders    Karissa Solomon is a 81 y.o. female   PRIMARY DIAGNOSIS: Stroke-like symptoms  Stroke-like symptoms [R29.90]  Cerebrovascular accident (CVA), unspecified mechanism ([facility] [I63.9]       Inpatient: Payor: MEDICARE / Plan: MEDICARE PART A AND B / Product Type: *No Product type* /     ASSESSMENT:     REHAB RECOMMENDATIONS: CURRENT LEVEL OF FUNCTION:  (Most Recently Demonstrated)   Recommendation to date pending progress:  Setting:  Inpatient Rehab Facility    Equipment:    To Be Determined Bathing:  Not Tested  Dressing:  Not Tested  Feeding/Grooming:  Contact Guard Assist  Toileting:  Not Tested  Functional Mobility:  Minimal Assist x 2     ASSESSMENT:  Ms. Melvin Rivera presents supine on entry with family present and asleep. Pt easily aroused. Pt completed bed mobility Min A x 2. Pt presents with improved alertness. Pt completed grooming seated at EOB. Pt completed sit<->stand Min A x 2 and side stepping EOB. Pt took seated rest break EOB. Pt completed t/f Eob->recliner with Min A x 2. Pt family reports moving pt around room without staff present.  Extensive education provided to pt family and pt given lines and increased need for skilled assistance. Pt family verbalized understanding. Call bell in pt reach at end of session. Pt tolerated session well. Pt making progress since [facility]. Will continue with POC as appropriate.        SUBJECTIVE:     Ms. Melvin Rivera states, \"Alrgiht\"     Social/Functional Lives With: Spouse  Type of Home: House  Bathroom Equipment: Grab bars in shower  Home Equipment: Valentino Sings, [address] Help From: Family  ADL Assistance: Independent  Homemaking Assistance: Independent  Ambulation Assistance: Independent  Transfer Assistance: Independent  Active : No  Patient's  Info: Spouse drives  Mode of Transportation: Car    OBJECTIVE:     Perlarainer Rusts / Charito Almazan / AIRWAY: IV    RESTRICTIONS/PRECAUTIONS:           PAIN: Arvil Octave / O2:   Pre Treatment:   Pain Assessment: None - Denies Pain        Post Treatment: None Vitals         Oxygen        MOBILITY: I Mod I S SBA CGA Min Mod Max Total  NT x2 Comments:   Bed Mobility    Rolling [] [] [] [] [] [x] [] [] [] [] [x]    Supine to Sit [] [] [] [] [] [x] [] [] [] [] [x]    Scooting [] [] [] [] [] [x] [] [] [] [] [x]    Sit to Supine [] [] [] [] [] [] [] [] [] [] []    Transfers    Sit to Stand [] [] [] [] [] [x] [] [] [] [] [x]    Bed to Chair [] [] [] [] [] [x] [] [] [] [] [x] Increased support at sub axillary region   Stand to Sit [] [] [] [] [] [x] [] [] [] [] [x]    Tub/Shower [] [] [] [] [] [] [] [] [] [] []     Toilet [] [] [] [] [] [] [] [] [] [] []      [] [] [] [] [] [] [] [] [] [] []    I=Independent, Mod I=Modified Independent, S=Supervision/Setup, SBA=Standby Assistance, CGA=Contact Guard Assistance, Min=Minimal Assistance, Mod=Moderate Assistance, Max=Maximal Assistance, Total=Total Assistance, NT=Not Tested    ACTIVITIES OF DAILY LIVING: I Mod I S SBA CGA Min Mod Max Total NT Comments   BASIC ADLs:              Upper Body   Bathing [] [] [] [] [] [] [] [] [] [x]    Lower Body Bathing [] [] [] [] [] [] [] [] [] [x]    Toileting [] [] [] [] [] [] [] [] [] [x]    Upper Body Dressing [] [] [] [] [] [] [] [] [] [x]    Lower Body Dressing [] [] [] [] [] [] [] [] [] [x]    Feeding [] [] [] [] [] [] [] [] [] [x]    Grooming [] [] [] [] [x] [] [] [] [] [] Face washing seated EOB   Personal Device Care [] [] [] [] [] [] [] [] [] [x]    Functional Mobility [] [] [] [] [] [x] [] [] [] [] X2 bed mobility, side stepping EOB, functional t/f to recliner with increased support at sub-axillary region   I=Independent, Mod I=Modified Independent, S=Supervision/Setup, SBA=Standby Assistance, CGA=Contact Guard Assistance, Min=Minimal Assistance, Mod=Moderate Assistance, Max=Maximal Assistance, Total=Total Assistance, NT=Not Tested    BALANCE: Good Fair+ Fair Fair- Poor NT Comments   Sitting Static [x] [] [] [] [] []    Sitting Dynamic [] [x] [] [] [] []              Standing Static [] [] [x] [] [] []    Standing Dynamic [] [] [x] [] [] []        PLAN:     FREQUENCY/DURATION   OT Plan of Care: 3 times/week for duration of hospital stay or until stated goals are met, whichever comes first.    TREATMENT:     TREATMENT:   Co-Treatment PT/OT necessary due to patient's decreased overall endurance/tolerance levels, as well as need for high level skilled assistance to complete functional transfers/mobility and functional tasks  Self Care (10 minutes): Patient participated in grooming and bed mobility and household mobility for ADLs in unsupported sitting and standing with moderate verbal cueing to increase independence, decrease assistance required, increase activity tolerance, and increase safety awareness. Patient also participated in functional mobility, functional transfer, and energy conservation training to increase independence, decrease assistance required, increase activity tolerance, and increase safety awareness.      TREATMENT GRID:  N/A    AFTER TREATMENT PRECAUTIONS: Alarm Activated, Bed/Chair Locked, Call light within reach, Chair, Needs within reach, RN notified, and Visitors at bedside    INTERDISCIPLINARY COLLABORATION:  RN/ PCT, PT/ PTA, and OT/ LOPEZ    EDUCATION:  Education Given To: Patient;Family  Education Provided: Precautions  Education Provided Comments: Safety in room. call bell use, staff presence for assistance  Education Method: Verbal  Education Outcome: Verbalized understanding;Continued education needed    TOTAL TREATMENT DURATION AND TIME:  Time In: 1510  Time Out: 1520  Minutes: 10    JOSE UMAÑA OT

## 2023-02-13 NOTE — CARE COORDINATION
LOS 3D  CM following for continued hospitalization. Patient's status discussed in IDR. Neurology consulting. Per note, patient with Afib with severed carotid stenosis bilaterally recommending medical management. Vascular surgery's note plan for MRI today with sedation. Plan follow up in the office. Nephrology consulted for hyponatremia. PT/OT recommending IRC. CM will discuss with pateint and her family.

## 2023-02-13 NOTE — PROGRESS NOTES
Neurology Daily Progress Note     Assessment:     60-year-old woman with a history of atrial fibrillation and carotid atherosclerosis bilaterally. In addition, the patient likely has Parkinson disease and underlying cognitive impairment. Plan:     MRI of the brain    The patient is at high risk for neurological complications in the setting of carotid artery surgical intervention. At this time, I favor medical management. In the setting of atrial fibrillation and severe carotid artery stenosis, the literature is unclear on the best antithrombotic regimen. For now, we will continue the patient on Xarelto. Further recommendations after MRI of the brain    Continue atorvastatin 80 mg daily    Subjective: Interval history:    The patient is doing about the same today. No specific complaints but she says very little. Family is at bedside    History:    Brian Sanchez is a 81 y.o. female who is being seen for stroke.     Past Medical History:   Diagnosis Date    Carotid stenosis     With infarct    Cerebral vascular accident [facility]) 07/2015    CVA (cerebrovascular accident) ([facility]     Depression     GERD (gastroesophageal reflux disease)     Hypercholesterolemia     Hypertension     Mitral valve regurgitation     Paroxysmal atrial fibrillation ([address]     Pulmonary hypertension (HCC)     SSS (sick sinus syndrome) ([address]     Tachycardia-bradycardia    Stroke ([facility] [address] 2015     Past Surgical History:   Procedure Laterality Date    COLONOSCOPY  2011    GYN      sharonda--years ago    ORTHOPEDIC SURGERY  2013    left total knee      Family History   Problem Relation Age of Onset    Stroke Mother     Heart Disease Father      Social History     Tobacco Use    Smoking status: Never    Smokeless tobacco: Never   Substance Use Topics    Alcohol use: No     Alcohol/week: 0.0 standard drinks      Current Facility-Administered Medications   Medication Dose Route Frequency Provider Last Rate Last Admin    amLODIPine (NORVASC) tablet 10 mg  10 mg Oral Daily Kenya Marin MD   10 mg at 02/13/23 0841    magnesium sulfate 2000 mg in 50 mL IVPB premix  2,000 mg IntraVENous Once Kenya Marin MD 25 mL/hr at 02/13/23 0840 2,000 mg at 02/13/23 0840    potassium chloride (KLOR-CON M) extended release tablet 40 mEq  40 mEq Oral BID WC Kenya Marin MD   40 mEq at 02/13/23 0841    LORazepam (ATIVAN) tablet 1 mg  1 mg Oral Nightly PRN Lee Ann Brown MD   1 mg at 02/12/23 2123    lisinopril (PRINIVIL;ZESTRIL) tablet 40 mg  40 mg Oral Daily Kenya Marin MD   40 mg at 02/13/23 0841    carbidopa-levodopa (SINEMET)  MG per tablet 0.5 tablet  0.5 tablet Oral TID Sampson Delatorre MD   0.5 tablet at 02/13/23 0841    [Held by provider] furosemide (LASIX) injection 40 mg  40 mg IntraVENous Daily Kenya Marin MD   40 mg at 02/12/23 0848    [Held by provider] aspirin EC tablet 81 mg  81 mg Oral Daily Claria Reusing, DO   81 mg at 02/12/23 0847    atorvastatin (LIPITOR) tablet 80 mg  80 mg Oral Nightly Claria Reusing, DO   80 mg at 02/12/23 2124    citalopram (CELEXA) tablet 20 mg  20 mg Oral Daily Claria Reusing, DO   20 mg at 02/13/23 0841    pantoprazole (PROTONIX) tablet 40 mg  40 mg Oral QAM AC Claria Reusing, DO   40 mg at 02/12/23 6993    rivaroxaban (XARELTO) tablet 20 mg  20 mg Oral Dinner Claria Reusing, DO   20 mg at 02/12/23 1727    spironolactone (ALDACTONE) tablet 25 mg  25 mg Oral Daily Claria Reusing, DO   25 mg at 02/13/23 0841    ondansetron (ZOFRAN-ODT) disintegrating tablet 4 mg  4 mg Oral Q8H PRN Claria Reusing, DO   4 mg at 02/12/23 2126    Or    ondansetron (ZOFRAN) injection 4 mg  4 mg IntraVENous Q6H PRN Claria Reusing, DO   4 mg at 02/10/23 0256    polyethylene glycol (GLYCOLAX) packet 17 g  17 g Oral Daily PRN Claria Reusing, DO        acetaminophen (TYLENOL) tablet 650 mg  650 mg Oral Q4H PRN Claria Reusing, DO   650 mg at 02/13/23 0845    Or    acetaminophen (TYLENOL) suppository 650 mg  650 mg Rectal Q4H PRN Claria Reusing, DO labetalol (NORMODYNE;TRANDATE) injection 10 mg  10 mg IntraVENous Q10 Min PRN Stoney Letha, DO        magnesium sulfate 2000 mg in 50 mL IVPB premix  2,000 mg IntraVENous PRN Stoney Letha, DO        potassium chloride (KLOR-CON M) extended release tablet 40 mEq  40 mEq Oral PRN Stoney Letha, DO        Or    potassium bicarb-citric acid (EFFER-K) effervescent tablet 40 mEq  40 mEq Oral PRN Stoney Letha, DO        Or    potassium chloride 10 mEq/100 mL IVPB (Peripheral Line)  10 mEq IntraVENous PRN Stoney Letha, DO        0.9 % sodium chloride bolus  100 mL IntraVENous ONCE PRN Sharmila Bartlett MD            No Known Allergies    Review of systems negative with exception of pertinent positives and negatives noted above.        Objective:     Vitals:    02/12/23 2215 02/13/23 0104 02/13/23 0441 02/13/23 0719   BP: (!) 196/108 (!) 185/83 (!) 161/109 (!) 156/59   Pulse:  97 100 85   Resp:  19 18 16   Temp:  97.9 °F (36.6 °C) 98.7 °F (37.1 °C) 97.7 °F (36.5 °C)   TempSrc:  Axillary Axillary Axillary   SpO2:  97% 94% 96%   Weight:       Height:              Current Facility-Administered Medications:     amLODIPine (NORVASC) tablet 10 mg, 10 mg, Oral, Daily, Nataliia Núñez MD, 10 mg at 02/13/23 0841    magnesium sulfate 2000 mg in 50 mL IVPB premix, 2,000 mg, IntraVENous, Once, Nataliia Núñez MD, Last Rate: 25 mL/hr at 02/13/23 0840, 2,000 mg at 02/13/23 0840    potassium chloride (KLOR-CON M) extended release tablet 40 mEq, 40 mEq, Oral, BID WC, Nataliia Núñez MD, 40 mEq at 02/13/23 0841    LORazepam (ATIVAN) tablet 1 mg, 1 mg, Oral, Nightly PRN, Danny Cui MD, 1 mg at 02/12/23 2123    lisinopril (PRINIVIL;ZESTRIL) tablet 40 mg, 40 mg, Oral, Daily, Nataliia Núñez MD, 40 mg at 02/13/23 0841    carbidopa-levodopa (SINEMET)  MG per tablet 0.5 tablet, 0.5 tablet, Oral, TID, Angelica Tejeda MD, 0.5 tablet at 02/13/23 0841    [Held by provider] furosemide (LASIX) injection 40 mg, 40 mg, IntraVENous, Daily, Nataliia Núñez, MD, 40 mg at 02/12/23 0848    [Held by provider] aspirin EC tablet 81 mg, 81 mg, Oral, Daily, Tremayne Inoue, DO, 81 mg at 02/12/23 0847    atorvastatin (LIPITOR) tablet 80 mg, 80 mg, Oral, Nightly, Tremayne Inoue, DO, 80 mg at 02/12/23 2124    citalopram (CELEXA) tablet 20 mg, 20 mg, Oral, Daily, Tremayne Inoue, DO, 20 mg at 02/13/23 0841    pantoprazole (PROTONIX) tablet 40 mg, 40 mg, Oral, QAM AC, [place], DO, 40 mg at 02/12/23 6210    rivaroxaban (XARELTO) tablet 20 mg, 20 mg, Oral, Dinner, Tremayne Inoue, DO, 20 mg at 02/12/23 1727    spironolactone (ALDACTONE) tablet 25 mg, 25 mg, Oral, Daily, [place], DO, 25 mg at 02/13/23 0841    ondansetron (ZOFRAN-ODT) disintegrating tablet 4 mg, 4 mg, Oral, Q8H PRN, 4 mg at 02/12/23 2126 **OR** ondansetron (ZOFRAN) injection 4 mg, 4 mg, IntraVENous, Q6H PRN, Tremayne Inoue, DO, 4 mg at 02/10/23 0256    polyethylene glycol (GLYCOLAX) packet 17 g, 17 g, Oral, Daily PRN, Tremayne Inoue, DO    acetaminophen (TYLENOL) tablet 650 mg, 650 mg, Oral, Q4H PRN, 650 mg at 02/13/23 0845 **OR** acetaminophen (TYLENOL) suppository 650 mg, 650 mg, Rectal, Q4H PRN, Tremayne Inoue, DO    labetalol (NORMODYNE;TRANDATE) injection 10 mg, 10 mg, IntraVENous, Q10 Min PRN, Tremayne Inoue, DO    magnesium sulfate 2000 mg in 50 mL IVPB premix, 2,000 mg, IntraVENous, PRN, Tremayne Inoue, DO    potassium chloride (KLOR-CON M) extended release tablet 40 mEq, 40 mEq, Oral, PRN **OR** potassium bicarb-citric acid (EFFER-K) effervescent tablet 40 mEq, 40 mEq, Oral, PRN **OR** potassium chloride 10 mEq/100 mL IVPB (Peripheral Line), 10 mEq, IntraVENous, PRN, Tremayne Erazo, DO    0.9 % sodium chloride bolus, 100 mL, IntraVENous, ONCE PRN, Machelle Ohara MD    Recent Results (from the past 12 hour(s))   CBC with Auto Differential    Collection Time: 02/13/23  6:42 AM   Result Value Ref Range    WBC 11.7 (H) 4.3 - 11.1 K/uL    RBC 4.05 4.05 - 5.2 M/uL    Hemoglobin 12.6 11.7 - 15.4 g/dL    Hematocrit 36.8 35.8 - 46.3 %    MCV 90.9 82 - 102 FL    MCH 31.1 26.1 - 32.9 PG    MCHC 34.2 31.4 - 35.0 g/dL    RDW 13.7 11.9 - 14.6 %    Platelets 871 060 - 364 K/uL    MPV 9.7 9.4 - 12.3 FL    nRBC 0.00 0.0 - 0.2 K/uL    Differential Type AUTOMATED      Seg Neutrophils 72 43 - 78 %    Lymphocytes 20 13 - 44 %    Monocytes 8 4.0 - 12.0 %    Eosinophils % 0 (L) 0.5 - 7.8 %    Basophils 0 0.0 - 2.0 %    Immature Granulocytes 0 0.0 - 5.0 %    Segs Absolute 8.3 (H) 1.7 - 8.2 K/UL    Absolute Lymph # 2.4 0.5 - 4.6 K/UL    Absolute Mono # 0.9 0.1 - 1.3 K/UL    Absolute Eos # 0.0 0.0 - 0.8 K/UL    Basophils Absolute 0.0 0.0 - 0.2 K/UL    Absolute Immature Granulocyte 0.0 0.0 - 0.5 K/UL   Basic Metabolic Panel w/ Reflex to MG    Collection Time: 02/13/23  6:42 AM   Result Value Ref Range    Sodium 123 (L) 133 - 143 mmol/L    Potassium 3.2 (L) 3.5 - 5.1 mmol/L    Chloride 89 (L) 101 - 110 mmol/L    CO2 24 21 - 32 mmol/L    Anion Gap 10 2 - 11 mmol/L    Glucose 112 (H) 65 - 100 mg/dL    BUN 8 8 - 23 MG/DL    Creatinine 0.50 (L) 0.6 - 1.0 MG/DL    Est, Glom Filt Rate >60 >60 ml/min/1.73m2    Calcium 8.8 8.3 - 10.4 MG/DL   Phosphorus    Collection Time: 02/13/23  6:42 AM   Result Value Ref Range    Phosphorus 2.3 2.3 - 3.7 MG/DL   Magnesium    Collection Time: 02/13/23  6:42 AM   Result Value Ref Range    Magnesium 1.2 (L) 1.8 - 2.4 mg/dL              Most recent Echo  Results for orders placed during the hospital encounter of 02/09/23    Transthoracic echocardiogram (TTE) complete with contrast, bubble, strain, and 3D PRN    Interpretation Summary    Left Ventricle: Normal left ventricular systolic function with a visually estimated EF of 60 - 65%. Left ventricle size is normal. Normal wall thickness. Normal wall motion. Normal diastolic function. Aortic Valve: Mild sclerosis of the aortic valve cusp. Mitral Valve: Mildly thickened leaflet, at the anterior leaflet. Moderate regurgitation.     Tricuspid Valve: Mild to moderate regurgitation. Moderately elevated RVSP. The estimated RVSP is 48 mmHg. Left Atrium: Left atrium is mildly dilated. LA Vol Index is  35 ml/m2. Interatrial Septum: Agitated saline study was negative with and without provocation. Right Atrium: Right atrium is mildly dilated. Technical qualifiers: Color flow Doppler was performed and pulse wave and/or continuous wave Doppler was performed. Physical Exam:  General - Well developed, well nourished, in no apparent distress. Pleasant but not conversant for much of the encounter  HEENT - Normocephalic, atraumatic. Conjunctiva are clear. Neck - Supple without masses  Extremities - Peripheral pulses intact. No edema and no rashes. Neurological examination -  Comprehension is poor but she does occasionally follow one-step commands. Attention is poor. Memory is poor. Language and speech are limited. Poor prosody of speech and poor fluency. On cranial nerve examination, (II, III, IV, VI) pupils are equal, round, and reactive to light. Visual acuity is adequate. Visual fields are full to finger confrontation. Extraocular motility is normal. (V, VII) Face is symmetric and sensation is intact to light touch. (VIII) Hearing is intact. (IX, X) Palate and uvula elevate symmetrically. Voice is normal. (XI) Shoulder shrug is strong and equal bilaterally. (XII)Tongue is midline. Mildly decreased facial expression  Motor examination -: Normal muscle bulk. Muscle tone is slightly increased with bilateral cogwheel rigidity in the upper extremities. Plantar response is flexor bilaterally. Sensation is intact to light touch, pinprick, vibration and proprioception in all extremities. Cerebellar examination is normal.  A kinetic tremor is present.   In addition, the patient has a rest tremor which oscillates in supination/pronation on the right    Signed By: Hesham Farah DO     February 13, 2023      I spent 35 minutes today with the patient, which included chart review, documentation, and greater than 50% of time was spent in direct face-to-face contact, obtaining history, exam, coordinating care, and counseling the patient on medical condition.

## 2023-02-14 ENCOUNTER — ANESTHESIA EVENT (OUTPATIENT)
Dept: MRI IMAGING | Age: 83
DRG: 064 | End: 2023-02-14
Payer: MEDICARE

## 2023-02-14 ENCOUNTER — ANESTHESIA (OUTPATIENT)
Dept: MRI IMAGING | Age: 83
DRG: 064 | End: 2023-02-14
Payer: MEDICARE

## 2023-02-14 ENCOUNTER — HOSPITAL ENCOUNTER (INPATIENT)
Dept: MRI IMAGING | Age: 83
Discharge: HOME | DRG: 064 | End: 2023-02-17
Payer: MEDICARE

## 2023-02-14 VITALS
SYSTOLIC BLOOD PRESSURE: 160 MMHG | OXYGEN SATURATION: 96 % | WEIGHT: 144 LBS | RESPIRATION RATE: 16 BRPM | DIASTOLIC BLOOD PRESSURE: 72 MMHG | HEART RATE: 61 BPM | BODY MASS INDEX: 27.19 KG/M2 | TEMPERATURE: 98 F | HEIGHT: 61 IN

## 2023-02-14 LAB
ANION GAP SERPL CALC-SCNC: 7 MMOL/L (ref 2–11)
BASOPHILS # BLD: 0 K/UL (ref 0–0.2)
BASOPHILS NFR BLD: 0 % (ref 0–2)
BUN SERPL-MCNC: 15 MG/DL (ref 8–23)
CALCIUM SERPL-MCNC: 8.7 MG/DL (ref 8.3–10.4)
CHLORIDE SERPL-SCNC: 92 MMOL/L (ref 101–110)
CO2 SERPL-SCNC: 28 MMOL/L (ref 21–32)
CREAT SERPL-MCNC: 0.7 MG/DL (ref 0.6–1)
DIFFERENTIAL METHOD BLD: ABNORMAL
EOSINOPHIL # BLD: 0.1 K/UL (ref 0–0.8)
EOSINOPHIL NFR BLD: 1 % (ref 0.5–7.8)
ERYTHROCYTE [DISTWIDTH] IN BLOOD BY AUTOMATED COUNT: 14.1 % (ref 11.9–14.6)
GLUCOSE SERPL-MCNC: 97 MG/DL (ref 65–100)
HCT VFR BLD AUTO: 36.4 % (ref 35.8–46.3)
HGB BLD-MCNC: 12.4 G/DL (ref 11.7–15.4)
IMM GRANULOCYTES # BLD AUTO: 0 K/UL (ref 0–0.5)
IMM GRANULOCYTES NFR BLD AUTO: 0 % (ref 0–5)
LYMPHOCYTES # BLD: 2 K/UL (ref 0.5–4.6)
LYMPHOCYTES NFR BLD: 29 % (ref 13–44)
MAGNESIUM SERPL-MCNC: 1.9 MG/DL (ref 1.8–2.4)
MCH RBC QN AUTO: 31.2 PG (ref 26.1–32.9)
MCHC RBC AUTO-ENTMCNC: 34.1 G/DL (ref 31.4–35)
MCV RBC AUTO: 91.5 FL (ref 82–102)
MONOCYTES # BLD: 0.7 K/UL (ref 0.1–1.3)
MONOCYTES NFR BLD: 9 % (ref 4–12)
NEUTS SEG # BLD: 4.1 K/UL (ref 1.7–8.2)
NEUTS SEG NFR BLD: 61 % (ref 43–78)
NRBC # BLD: 0 K/UL (ref 0–0.2)
PHOSPHATE SERPL-MCNC: 3.1 MG/DL (ref 2.3–3.7)
PLATELET # BLD AUTO: 244 K/UL (ref 150–450)
PMV BLD AUTO: 9.3 FL (ref 9.4–12.3)
POTASSIUM SERPL-SCNC: 3.5 MMOL/L (ref 3.5–5.1)
RBC # BLD AUTO: 3.98 M/UL (ref 4.05–5.2)
SODIUM SERPL-SCNC: 127 MMOL/L (ref 133–143)
WBC # BLD AUTO: 6.9 K/UL (ref 4.3–11.1)

## 2023-02-14 PROCEDURE — 6360000002 HC RX W HCPCS: Performed by: REGISTERED NURSE

## 2023-02-14 PROCEDURE — 85025 COMPLETE CBC W/AUTO DIFF WBC: CPT

## 2023-02-14 PROCEDURE — 92523 SPEECH SOUND LANG COMPREHEN: CPT

## 2023-02-14 PROCEDURE — 84100 ASSAY OF PHOSPHORUS: CPT

## 2023-02-14 PROCEDURE — 99232 SBSQ HOSP IP/OBS MODERATE 35: CPT | Performed by: NURSE PRACTITIONER

## 2023-02-14 PROCEDURE — 7100000000 HC PACU RECOVERY - FIRST 15 MIN

## 2023-02-14 PROCEDURE — 6370000000 HC RX 637 (ALT 250 FOR IP): Performed by: STUDENT IN AN ORGANIZED HEALTH CARE EDUCATION/TRAINING PROGRAM

## 2023-02-14 PROCEDURE — 2500000003 HC RX 250 WO HCPCS: Performed by: REGISTERED NURSE

## 2023-02-14 PROCEDURE — 6370000000 HC RX 637 (ALT 250 FOR IP): Performed by: PSYCHIATRY & NEUROLOGY

## 2023-02-14 PROCEDURE — 36415 COLL VENOUS BLD VENIPUNCTURE: CPT

## 2023-02-14 PROCEDURE — 1100000003 HC PRIVATE W/ TELEMETRY

## 2023-02-14 PROCEDURE — 6370000000 HC RX 637 (ALT 250 FOR IP): Performed by: FAMILY MEDICINE

## 2023-02-14 PROCEDURE — 7100000001 HC PACU RECOVERY - ADDTL 15 MIN

## 2023-02-14 PROCEDURE — 80048 BASIC METABOLIC PNL TOTAL CA: CPT

## 2023-02-14 PROCEDURE — 3700000000 HC ANESTHESIA ATTENDED CARE

## 2023-02-14 PROCEDURE — 3700000001 HC ADD 15 MINUTES (ANESTHESIA)

## 2023-02-14 PROCEDURE — 70551 MRI BRAIN STEM W/O DYE: CPT

## 2023-02-14 PROCEDURE — 83735 ASSAY OF MAGNESIUM: CPT

## 2023-02-14 PROCEDURE — 6370000000 HC RX 637 (ALT 250 FOR IP): Performed by: HOSPITALIST

## 2023-02-14 PROCEDURE — 2580000003 HC RX 258: Performed by: REGISTERED NURSE

## 2023-02-14 RX ORDER — PROPOFOL 10 MG/ML
INJECTION, EMULSION INTRAVENOUS PRN
Status: DISCONTINUED | OUTPATIENT
Start: 2023-02-14 | End: 2023-02-14 | Stop reason: SDUPTHER

## 2023-02-14 RX ORDER — SODIUM CHLORIDE, SODIUM LACTATE, POTASSIUM CHLORIDE, CALCIUM CHLORIDE 600; 310; 30; 20 MG/100ML; MG/100ML; MG/100ML; MG/100ML
INJECTION, SOLUTION INTRAVENOUS CONTINUOUS PRN
Status: DISCONTINUED | OUTPATIENT
Start: 2023-02-14 | End: 2023-02-14 | Stop reason: SDUPTHER

## 2023-02-14 RX ORDER — LANOLIN ALCOHOL/MO/W.PET/CERES
400 CREAM (GRAM) TOPICAL DAILY
Status: COMPLETED | OUTPATIENT
Start: 2023-02-14 | End: 2023-02-16

## 2023-02-14 RX ORDER — PROPOFOL 10 MG/ML
INJECTION, EMULSION INTRAVENOUS CONTINUOUS PRN
Status: DISCONTINUED | OUTPATIENT
Start: 2023-02-14 | End: 2023-02-14 | Stop reason: SDUPTHER

## 2023-02-14 RX ORDER — LIDOCAINE HYDROCHLORIDE 20 MG/ML
INJECTION, SOLUTION EPIDURAL; INFILTRATION; INTRACAUDAL; PERINEURAL PRN
Status: DISCONTINUED | OUTPATIENT
Start: 2023-02-14 | End: 2023-02-14 | Stop reason: SDUPTHER

## 2023-02-14 RX ADMIN — LIDOCAINE HYDROCHLORIDE 100 MG: 20 INJECTION, SOLUTION EPIDURAL; INFILTRATION; INTRACAUDAL; PERINEURAL at 09:56

## 2023-02-14 RX ADMIN — LORAZEPAM 1 MG: 1 TABLET ORAL at 20:51

## 2023-02-14 RX ADMIN — ATORVASTATIN CALCIUM 80 MG: 40 TABLET, FILM COATED ORAL at 20:51

## 2023-02-14 RX ADMIN — MAGNESIUM GLUCONATE 500 MG ORAL TABLET 400 MG: 500 TABLET ORAL at 12:05

## 2023-02-14 RX ADMIN — ACETAMINOPHEN 650 MG: 325 TABLET ORAL at 22:29

## 2023-02-14 RX ADMIN — CARBIDOPA AND LEVODOPA 0.5 TABLET: 25; 100 TABLET ORAL at 13:42

## 2023-02-14 RX ADMIN — RIVAROXABAN 20 MG: 20 TABLET, FILM COATED ORAL at 17:58

## 2023-02-14 RX ADMIN — ACETAMINOPHEN 650 MG: 325 TABLET ORAL at 12:05

## 2023-02-14 RX ADMIN — PROPOFOL 50 MG: 10 INJECTION, EMULSION INTRAVENOUS at 09:56

## 2023-02-14 RX ADMIN — CARBIDOPA AND LEVODOPA 0.5 TABLET: 25; 100 TABLET ORAL at 08:36

## 2023-02-14 RX ADMIN — PROPOFOL 75 MCG/KG/MIN: 10 INJECTION, EMULSION INTRAVENOUS at 09:56

## 2023-02-14 RX ADMIN — SODIUM CHLORIDE, SODIUM LACTATE, POTASSIUM CHLORIDE, AND CALCIUM CHLORIDE: 600; 310; 30; 20 INJECTION, SOLUTION INTRAVENOUS at 09:52

## 2023-02-14 RX ADMIN — LISINOPRIL 40 MG: 20 TABLET ORAL at 12:05

## 2023-02-14 RX ADMIN — CITALOPRAM HYDROBROMIDE 20 MG: 20 TABLET ORAL at 08:36

## 2023-02-14 RX ADMIN — CARBIDOPA AND LEVODOPA 0.5 TABLET: 25; 100 TABLET ORAL at 20:52

## 2023-02-14 RX ADMIN — ACETAMINOPHEN 650 MG: 325 TABLET ORAL at 17:58

## 2023-02-14 RX ADMIN — PANTOPRAZOLE SODIUM 40 MG: 40 TABLET, DELAYED RELEASE ORAL at 06:25

## 2023-02-14 RX ADMIN — AMLODIPINE BESYLATE 10 MG: 10 TABLET ORAL at 08:36

## 2023-02-14 ASSESSMENT — PAIN DESCRIPTION - DESCRIPTORS
DESCRIPTORS: ACHING

## 2023-02-14 ASSESSMENT — PAIN - FUNCTIONAL ASSESSMENT
PAIN_FUNCTIONAL_ASSESSMENT: PREVENTS OR INTERFERES WITH MANY ACTIVE NOT PASSIVE ACTIVITIES
PAIN_FUNCTIONAL_ASSESSMENT: 0-10
PAIN_FUNCTIONAL_ASSESSMENT: ACTIVITIES ARE NOT PREVENTED
PAIN_FUNCTIONAL_ASSESSMENT: PREVENTS OR INTERFERES SOME ACTIVE ACTIVITIES AND ADLS

## 2023-02-14 ASSESSMENT — PAIN DESCRIPTION - ORIENTATION
ORIENTATION: RIGHT
ORIENTATION: LEFT
ORIENTATION: ANTERIOR

## 2023-02-14 ASSESSMENT — PAIN SCALES - GENERAL
PAINLEVEL_OUTOF10: 3
PAINLEVEL_OUTOF10: 0
PAINLEVEL_OUTOF10: 1
PAINLEVEL_OUTOF10: 0
PAINLEVEL_OUTOF10: 3

## 2023-02-14 ASSESSMENT — PAIN DESCRIPTION - LOCATION
LOCATION: SHOULDER
LOCATION: HEAD
LOCATION: SHOULDER

## 2023-02-14 ASSESSMENT — PAIN SCALES - WONG BAKER: WONGBAKER_NUMERICALRESPONSE: 0

## 2023-02-14 ASSESSMENT — PAIN DESCRIPTION - RADICULAR PAIN: RADICULAR_PAIN: ABSENT

## 2023-02-14 NOTE — ACP (ADVANCE CARE PLANNING)
Advance Care Planning     Advance Care Planning Inpatient Note  Spiritual Care Department    Today's Date: 2/14/2023  Unit: SFD 3 TELEMETRY    Received request from IDT Member and family. Upon review of chart and communication with care team, Spiritual Care will defer advance care planning with patient at this time. . Patient, Spouse, and Child/Children was/were present in the room during visit. Assessment:  CH reviewed chart and checked with RN to discern PT's ability to make decisions. RN stated that PT has improved. However, RN  and [facility] felt conversation would be better at another time  d/t nature of PT's illness. PT was in bed with family at bedside. CH mentioned HC POA. PT and Spouse stated PT was not interested in [facility], [facility]. POA at this time.      Interventions:  Deferred conversation as patient not interested in completing an advance directive at this time      Electronically signed by Larry Ashley on 2/14/2023 at 4:10 PM

## 2023-02-14 NOTE — ANESTHESIA POSTPROCEDURE EVALUATION
Department of Anesthesiology  Postprocedure Note    Patient: Alba Reaves  MRN: [id number]  YOB: 1940  Date of evaluation: 2/14/2023      Procedure Summary     Date: 02/14/23 Room / Location: [facility]    Anesthesia Start: 8347 Anesthesia Stop: 3038    Procedure: MRI BRAIN WO CONTRAST Diagnosis: (stroke like symptoms)    Scheduled Providers: KACY Soto - CRNA; Jj Rizvi MD Responsible Provider: Jj Rizvi MD    Anesthesia Type: TIVA ASA Status: 3          Anesthesia Type: TIVA    Yung Phase I: Yung Score: 8    Yung Phase II:        Anesthesia Post Evaluation    Patient location during evaluation: PACU  Patient participation: complete - patient participated  Level of consciousness: awake and alert  Airway patency: patent  Nausea & Vomiting: no nausea and no vomiting  Complications: no  Cardiovascular status: hemodynamically stable  Respiratory status: acceptable  Hydration status: euvolemic  Comments: Blood pressure (!) 160/72, pulse 61, temperature 98 °F (36.7 °C), temperature source Temporal, resp. rate 16, height 5' 1\" (1.549 m), weight 144 lb (65.3 kg), SpO2 96 %.       Pt stable for discharge from PACU  Multimodal analgesia pain management approach

## 2023-02-14 NOTE — PROGRESS NOTES
Admit Date: 2/9/2023  81 y.o. female with medical history of AFIB, CVA, carotid stenosis, HTN  She presented with aphasia, confusion, SOB with hypoxia    CT scan of head neg for acute changes   Chest xray: lung congestion   Renal requested for hyponatremia     Subjective:          Review of Systems  Cardio-vascular: no chest pain, no SOB  GI: no N/V/D  : no dysuria, no hematuria    Objective:     Patient Vitals for the past 8 hrs:   BP Temp Temp src Pulse Resp SpO2 Weight   02/14/23 0836 (!) 147/76 -- -- -- -- -- --   02/14/23 0745 (!) 147/76 97.4 °F (36.3 °C) Oral 69 16 93 % --   02/14/23 0615 -- -- -- -- -- -- 144 lb 4.8 oz (65.5 kg)   02/14/23 0600 -- -- -- -- -- -- 151 lb 10.8 oz (68.8 kg)     02/14 0701 - 02/14 1900  In: 850 [P.O.:50; I.V.:800]  Out: 0       Physical Exam:   Lungs: clear  CV: RR, no rub   Abdomen: soft, not tender, no rebound.   Ext: no edema          Data Review   Recent Results (from the past 8 hour(s))   CBC with Auto Differential    Collection Time: 02/14/23  5:30 AM   Result Value Ref Range    WBC 6.9 4.3 - 11.1 K/uL    RBC 3.98 (L) 4.05 - 5.2 M/uL    Hemoglobin 12.4 11.7 - 15.4 g/dL    Hematocrit 36.4 35.8 - 46.3 %    MCV 91.5 82 - 102 FL    MCH 31.2 26.1 - 32.9 PG    MCHC 34.1 31.4 - 35.0 g/dL    RDW 14.1 11.9 - 14.6 %    Platelets 067 512 - 772 K/uL    MPV 9.3 (L) 9.4 - 12.3 FL    nRBC 0.00 0.0 - 0.2 K/uL    Differential Type AUTOMATED      Seg Neutrophils 61 43 - 78 %    Lymphocytes 29 13 - 44 %    Monocytes 9 4.0 - 12.0 %    Eosinophils % 1 0.5 - 7.8 %    Basophils 0 0.0 - 2.0 %    Immature Granulocytes 0 0.0 - 5.0 %    Segs Absolute 4.1 1.7 - 8.2 K/UL    Absolute Lymph # 2.0 0.5 - 4.6 K/UL    Absolute Mono # 0.7 0.1 - 1.3 K/UL    Absolute Eos # 0.1 0.0 - 0.8 K/UL    Basophils Absolute 0.0 0.0 - 0.2 K/UL    Absolute Immature Granulocyte 0.0 0.0 - 0.5 K/UL   Basic Metabolic Panel w/ Reflex to MG    Collection Time: 02/14/23  5:30 AM   Result Value Ref Range    Sodium 127 (L) 133 - 143 mmol/L    Potassium 3.5 3.5 - 5.1 mmol/L    Chloride 92 (L) 101 - 110 mmol/L    CO2 28 21 - 32 mmol/L    Anion Gap 7 2 - 11 mmol/L    Glucose 97 65 - 100 mg/dL    BUN 15 8 - 23 MG/DL    Creatinine 0.70 0.6 - 1.0 MG/DL    Est, Glom Filt Rate >60 >60 ml/min/1.73m2    Calcium 8.7 8.3 - 10.4 MG/DL   Phosphorus    Collection Time: 02/14/23  5:30 AM   Result Value Ref Range    Phosphorus 3.1 2.3 - 3.7 MG/DL   Magnesium    Collection Time: 02/14/23  5:30 AM   Result Value Ref Range    Magnesium 1.9 1.8 - 2.4 mg/dL           Assessment:     Principal Problem:    Stroke-like symptoms  Active Problems:    Hyponatremia    Hypokalemia    Acute respiratory failure with hypoxia (HCC)    Acute metabolic encephalopathy    Cerebrovascular accident (CVA) ([address]    Primary insomnia    Long term current use of anticoagulant    Generalized anxiety disorder    Gastroesophageal reflux disease without esophagitis    Pulmonary hypertension (HCC)    SSS (sick sinus syndrome) (HCC)    Permanent atrial fibrillation (HCC)    Mitral valve regurgitation    History of CVA (cerebrovascular accident)    Bilateral carotid artery disease (HCC)    Pure hypercholesterolemia    Essential tremor  Resolved Problems:    * No resolved hospital problems. *      Plan:     -Chronic hypo osmolar hyponatremia  Volume expanded. High urine Na: on Aldactone,   Hyponatremia could be exacerbated with Aldactone and Celexa, hypokalemia. On Urea better      - Hypokalemia, hypomagnesemia: supplement, better      -CVA: may be recurrent, for MRI today     - A.  Fib     -HTN     Ryan Vidal MD

## 2023-02-14 NOTE — PROGRESS NOTES
Spiritual Consult for [facility], [facility]. POA. [address] [place] [address] reviewed chart and checked with RN to discern PT's ability to make decisions. RN stated that PT has improved. However, RN  and [address] [place] [address] felt conversation would be better at another time  d/t nature of PT's illness. PT was in bed with family at bedside. [address] [place] [address] introduced self. PT and Family talked about PT's illness. CH mentioned HC POA. PT and Spouse stated PT was not interested in [facility], [facility]. POA at this time. PT and Family engaged in life review. PT was a pediatric oncology RN.  was the Glenda Fuentes of an [employer] [id number] [employer]. PT and Family expressed importance of Family and Laly. PT is [employer] and currently attends the [email or web address].  offered prayer. [address] [place] [address] checked for unmet needs. [address] [place] [address] thanked PT and family for visit and offered additional support. Rev. Michaelle Burton M.Div.

## 2023-02-14 NOTE — PROGRESS NOTES
GOALS:   STG: Patient will complete verbal/visual problem solving tasks with 80% accuracy with minimal assistance. STG: Patient will follow multi step directions with 80% accuracy with minimal assistance. STG: Patient will complete basic mental manipulation tasks with 80% accuracy with minimal assistance. STG: Patient will complete divergent naming tasks with 80% accuracy with minimal assistance. STG: Patient will complete short-term memory tasks with 80% accuracy with minimal assistance. LTG: Patient will increase neuro-linguistic abilities to increase safety and awareness of deficits. SPEECH LANGUAGE PATHOLOGY: COMMUNICATION Initial Assessment    MRN: [id number]    ADMISSION DATE: 2/9/2023  PRIMARY DIAGNOSIS: Stroke-like symptoms  Stroke-like symptoms [R29.90]  Cerebrovascular accident (CVA), unspecified mechanism ([facility] [I63.9]    ICD-10: Treatment Diagnosis: R41.841 Cognitive-Communication Deficit  D  RECOMMENDATIONS:   Recommendations:    Continued ST to address cognitive-linguistic treatment     Patient continues to require skilled intervention: Yes  Ongoing speech therapy is recommended during this hospitalization, To be determined       ASSESSMENT   Patient participated with a cognitive-linguistic screening with the [facility] Status Examination (SLUMS). Family reports patient was aphasic following CVA in 2015 but regained ability to communicate and most of her independence prior to this admission including driving. Patient presents with decreased auditory processing, impaired working and short-term memory, decreased fluency with divergent naming, and impaired reasoning skills. Overall score of 15/30 with a norm score of greater than 26 indicating significant impairment.   Suspect some cognitive deficits pre-morbidly given PD, prior CVA, and moderate chronic white matter microvascular ischemic changes on MRI; however, small foci of acute/subacute infarct in the right anterior paramedian frontal lobe also revealed on MRI completed this am.  Continued ST to address cognitive-linguistic function recommended. GENERAL      Communication Observation: Functional (some word finding difficulty)  Follows Directions: Simple     Prior Dysphagia History: none     Pain:         No pain reported          OBJECTIVE   The [facility] Mental Status (UMS) Examination and other measures completed to evaluate cognitive linguistic functioning:   Highest level of education: pediatric RN  Total score: 15/30  Orientation-3/3  Recall(5 words)- immediate: 3/5; delayed:5  Problem solvin/3  Divergent naming(concrete category): 1/3  Digit manipulation:   Visuospatial: 2  Clock drawin/4  Immediate recall (passage)-    PLAN    Duration/Frequency: Continue to follow patient 5x/week for duration of hospitalization and/or until goals met    Speech Therapy Prognosis  Prognosis: Good    MODIFIED ALBA SCALE (mRS) SCORE: 3  Interpretation of Tool: The Modified Labette Scale is a scale used to quantify level of disability as it relates to a patient's functional abilities. No Symptoms(0); No significant disability despite symptoms; able to carry out all usual duties and activities(1); Slight disability; unable to carry out all previous activities but able to look after own affairs without assistance(2); Moderate disability; requiring some help but able to walk without assistance(3); Moderately severe disability; unable to walk without assistance and unable to attend to own bodily needs without assistance(4); Severe disability; bedridden, incontinent, and requiring constant nursing care and attention(5)      Education: Patient, RN, Family member  Patient Education: diet consistencies, aspiration precautions, role of SLP       PRECAUTIONS/ALLERGIES: Patient has no known allergies.      Safety Devices in place: Yes  Type of devices: Call light within reach, Left in bed, Nurse notified    Therapy Time  SLP Individual Minutes  Time In: 1300  Time Out: 1320  Minutes: 20     SLP Total Treatment Time  Total Treatment Time: [address]  2/14/2023 1:36 PM

## 2023-02-14 NOTE — PROGRESS NOTES
Neurology Daily Progress Note     Assessment:     81-year-old woman with small left frontal lobe stroke, embolic. She has a history of atrial fibrillation and carotid atherosclerosis bilaterally. In addition, the patient likely has Parkinson disease and underlying cognitive impairment. Plan:       The patient is at high risk for neurological complications in the setting of carotid artery surgical intervention. At this time, I favor medical management. In the setting of atrial fibrillation and severe carotid artery stenosis, the literature is unclear on the best antithrombotic regimen. Continue Xarelto and aspirin    Continue atorvastatin 80 mg daily    Follow up with neurology as an outpatient     Subjective: Interval history:    Patient is awake and alert. MRI shows a small infarcts in the right frontal lobe. History:    Shiraz Taylor is a 81 y.o. female who is being seen for stroke.     Past Medical History:   Diagnosis Date    Carotid stenosis     With infarct    Cerebral vascular accident [facility]) 07/2015    CVA (cerebrovascular accident) ([facility] [address]     Depression     GERD (gastroesophageal reflux disease)     Hypercholesterolemia     Hypertension     Mitral valve regurgitation     Paroxysmal atrial fibrillation ([facility] [address]     Pulmonary hypertension (HCC)     SSS (sick sinus syndrome) ([facility] [address]     Tachycardia-bradycardia    Stroke ([facility] [address] 2015     Past Surgical History:   Procedure Laterality Date    COLONOSCOPY  2011    GYN      sharonda--years ago    ORTHOPEDIC SURGERY  2013    left total knee      Family History   Problem Relation Age of Onset    Stroke Mother     Heart Disease Father      Social History     Tobacco Use    Smoking status: Never    Smokeless tobacco: Never   Substance Use Topics    Alcohol use: No     Alcohol/week: 0.0 standard drinks      Current Facility-Administered Medications   Medication Dose Route Frequency Provider Last Rate Last Admin    magnesium oxide (MAG-OX) tablet 400 mg  400 mg Oral Daily Johan Mayorga MD   400 mg at 02/14/23 1205    tuberculin injection 5 Units  5 Units IntraDERmal Once Johan Mayorga MD        amLODIPine (NORVASC) tablet 10 mg  10 mg Oral Daily Johan Mayorga MD   10 mg at 02/14/23 0836    urea (URE-NA) packet 15 g  15 g Oral Daily Cyndie Walter MD   15 g at 02/13/23 1345    LORazepam (ATIVAN) tablet 1 mg  1 mg Oral Nightly PRN Za Carrion MD   1 mg at 02/13/23 2029    lisinopril (PRINIVIL;ZESTRIL) tablet 40 mg  40 mg Oral Daily Johan Mayorga MD   40 mg at 02/14/23 1205    carbidopa-levodopa (SINEMET)  MG per tablet 0.5 tablet  0.5 tablet Oral TID Supriya Lerner MD   0.5 tablet at 02/14/23 0836    [Held by provider] furosemide (LASIX) injection 40 mg  40 mg IntraVENous Daily Johan Mayorga MD   40 mg at 02/12/23 0848    [Held by provider] aspirin EC tablet 81 mg  81 mg Oral Daily Shira Jamaica, DO   81 mg at 02/12/23 0847    atorvastatin (LIPITOR) tablet 80 mg  80 mg Oral Nightly Shira Jamaica, DO   80 mg at 02/13/23 2026    citalopram (CELEXA) tablet 20 mg  20 mg Oral Daily Shira Jamaica, DO   20 mg at 02/14/23 0836    pantoprazole (PROTONIX) tablet 40 mg  40 mg Oral QAM AC Shira Jamaica, DO   40 mg at 02/14/23 1600    rivaroxaban (XARELTO) tablet 20 mg  20 mg Oral Dinner Shira Jamaica, DO   20 mg at 02/13/23 1708    spironolactone (ALDACTONE) tablet 25 mg  25 mg Oral Daily Abeba Light MD   25 mg at 02/13/23 0841    ondansetron (ZOFRAN-ODT) disintegrating tablet 4 mg  4 mg Oral Q8H PRN Shira Jamaica, DO   4 mg at 02/12/23 2126    Or    ondansetron (ZOFRAN) injection 4 mg  4 mg IntraVENous Q6H PRN Shira Jamaica, DO   4 mg at 02/10/23 0256    polyethylene glycol (GLYCOLAX) packet 17 g  17 g Oral Daily PRN Shira Jamaica, DO        acetaminophen (TYLENOL) tablet 650 mg  650 mg Oral Q4H PRN Shira Jamaica, DO   650 mg at 02/14/23 1205    Or    acetaminophen (TYLENOL) suppository 650 mg  650 mg Rectal Q4H PRN Shira Jamaica, DO        labetalol (NORMODYNE;TRANDATE) injection 10 mg  10 mg IntraVENous Q10 Min PRN [place] Inoue, DO        magnesium sulfate 2000 mg in 50 mL IVPB premix  2,000 mg IntraVENous PRN [place] Inoue, DO        potassium chloride (KLOR-CON M) extended release tablet 40 mEq  40 mEq Oral PRN [place] Inoue, DO        Or    potassium bicarb-citric acid (EFFER-K) effervescent tablet 40 mEq  40 mEq Oral PRN Tremayne Inoue, DO        Or    potassium chloride 10 mEq/100 mL IVPB (Peripheral Line)  10 mEq IntraVENous PRN [place] Inoue, DO        0.9 % sodium chloride bolus  100 mL IntraVENous ONCE PRN Machelle Ohara MD            No Known Allergies    Review of systems negative with exception of pertinent positives and negatives noted above.        Objective:     Vitals:    02/14/23 0615 02/14/23 0745 02/14/23 0836 02/14/23 1205   BP:  (!) 147/76 (!) 147/76 (!) 157/71   Pulse:  69     Resp:  16     Temp:  97.4 °F (36.3 °C)     TempSrc:  Oral     SpO2:  93%     Weight: 144 lb 4.8 oz (65.5 kg)      Height:              Current Facility-Administered Medications:     magnesium oxide (MAG-OX) tablet 400 mg, 400 mg, Oral, Daily, Merced Lima MD, 400 mg at 02/14/23 1205    tuberculin injection 5 Units, 5 Units, IntraDERmal, Once, Merced Lima MD    amLODIPine (NORVASC) tablet 10 mg, 10 mg, Oral, Daily, Merced Lima MD, 10 mg at 02/14/23 0836    urea (URE-NA) packet 15 g, 15 g, Oral, Daily, Mya Galo MD, 15 g at 02/13/23 1345    LORazepam (ATIVAN) tablet 1 mg, 1 mg, Oral, Nightly PRN, Danny Cui MD, 1 mg at 02/13/23 2029    lisinopril (PRINIVIL;ZESTRIL) tablet 40 mg, 40 mg, Oral, Daily, Merced Lima MD, 40 mg at 02/14/23 1205    carbidopa-levodopa (SINEMET)  MG per tablet 0.5 tablet, 0.5 tablet, Oral, TID, Charmian Primrose, MD, 0.5 tablet at 02/14/23 0836    [Held by provider] furosemide (LASIX) injection 40 mg, 40 mg, IntraVENous, Daily, Merced Lima MD, 40 mg at 02/12/23 0848    [Held by provider] aspirin EC tablet 81 mg, 81 mg, Oral, Daily, Tremayne Erazo, DO, 81 mg at 02/12/23 0847    atorvastatin (LIPITOR) tablet 80 mg, 80 mg, Oral, Nightly, Maya Humbles, DO, 80 mg at 02/13/23 2026    citalopram (CELEXA) tablet 20 mg, 20 mg, Oral, Daily, Maya Humbles, DO, 20 mg at 02/14/23 0836    pantoprazole (PROTONIX) tablet 40 mg, 40 mg, Oral, QAM AC, Maya Humbles, DO, 40 mg at 02/14/23 0084    rivaroxaban (XARELTO) tablet 20 mg, 20 mg, Oral, Dinner, Maya Humbles, DO, 20 mg at 02/13/23 1708    spironolactone (ALDACTONE) tablet 25 mg, 25 mg, Oral, Daily, Filiberto Perkins MD, 25 mg at 02/13/23 0841    ondansetron (ZOFRAN-ODT) disintegrating tablet 4 mg, 4 mg, Oral, Q8H PRN, 4 mg at 02/12/23 2126 **OR** ondansetron (ZOFRAN) injection 4 mg, 4 mg, IntraVENous, Q6H PRN, Maya Humbles, DO, 4 mg at 02/10/23 0256    polyethylene glycol (GLYCOLAX) packet 17 g, 17 g, Oral, Daily PRN, Maya Humbles, DO    acetaminophen (TYLENOL) tablet 650 mg, 650 mg, Oral, Q4H PRN, 650 mg at 02/14/23 1205 **OR** acetaminophen (TYLENOL) suppository 650 mg, 650 mg, Rectal, Q4H PRN, Maya Humbles, DO    labetalol (NORMODYNE;TRANDATE) injection 10 mg, 10 mg, IntraVENous, Q10 Min PRN, Maya Humbles, DO    magnesium sulfate 2000 mg in 50 mL IVPB premix, 2,000 mg, IntraVENous, PRN, Maya Humbles, DO    potassium chloride (KLOR-CON M) extended release tablet 40 mEq, 40 mEq, Oral, PRN **OR** potassium bicarb-citric acid (EFFER-K) effervescent tablet 40 mEq, 40 mEq, Oral, PRN **OR** potassium chloride 10 mEq/100 mL IVPB (Peripheral Line), 10 mEq, IntraVENous, PRN, Maya Humbles, DO    0.9 % sodium chloride bolus, 100 mL, IntraVENous, ONCE PRN, Monet Campbell MD    Recent Results (from the past 12 hour(s))   CBC with Auto Differential    Collection Time: 02/14/23  5:30 AM   Result Value Ref Range    WBC 6.9 4.3 - 11.1 K/uL    RBC 3.98 (L) 4.05 - 5.2 M/uL    Hemoglobin 12.4 11.7 - 15.4 g/dL    Hematocrit 36.4 35.8 - 46.3 %    MCV 91.5 82 - 102 FL    MCH 31.2 26.1 - 32.9 PG    MCHC 34.1 31.4 - 35.0 g/dL    RDW 14.1 11.9 - 14.6 %    Platelets 384 951 - 742 K/uL    MPV 9.3 (L) 9.4 - 12.3 FL    nRBC 0.00 0.0 - 0.2 K/uL    Differential Type AUTOMATED      Seg Neutrophils 61 43 - 78 %    Lymphocytes 29 13 - 44 %    Monocytes 9 4.0 - 12.0 %    Eosinophils % 1 0.5 - 7.8 %    Basophils 0 0.0 - 2.0 %    Immature Granulocytes 0 0.0 - 5.0 %    Segs Absolute 4.1 1.7 - 8.2 K/UL    Absolute Lymph # 2.0 0.5 - 4.6 K/UL    Absolute Mono # 0.7 0.1 - 1.3 K/UL    Absolute Eos # 0.1 0.0 - 0.8 K/UL    Basophils Absolute 0.0 0.0 - 0.2 K/UL    Absolute Immature Granulocyte 0.0 0.0 - 0.5 K/UL   Basic Metabolic Panel w/ Reflex to MG    Collection Time: 02/14/23  5:30 AM   Result Value Ref Range    Sodium 127 (L) 133 - 143 mmol/L    Potassium 3.5 3.5 - 5.1 mmol/L    Chloride 92 (L) 101 - 110 mmol/L    CO2 28 21 - 32 mmol/L    Anion Gap 7 2 - 11 mmol/L    Glucose 97 65 - 100 mg/dL    BUN 15 8 - 23 MG/DL    Creatinine 0.70 0.6 - 1.0 MG/DL    Est, Glom Filt Rate >60 >60 ml/min/1.73m2    Calcium 8.7 8.3 - 10.4 MG/DL   Phosphorus    Collection Time: 02/14/23  5:30 AM   Result Value Ref Range    Phosphorus 3.1 2.3 - 3.7 MG/DL   Magnesium    Collection Time: 02/14/23  5:30 AM   Result Value Ref Range    Magnesium 1.9 1.8 - 2.4 mg/dL              Most recent Echo  Results for orders placed during the hospital encounter of 02/09/23    Transthoracic echocardiogram (TTE) complete with contrast, bubble, strain, and 3D PRN    Interpretation Summary    Left Ventricle: Normal left ventricular systolic function with a visually estimated EF of 60 - 65%. Left ventricle size is normal. Normal wall thickness. Normal wall motion. Normal diastolic function. Aortic Valve: Mild sclerosis of the aortic valve cusp. Mitral Valve: Mildly thickened leaflet, at the anterior leaflet. Moderate regurgitation. Tricuspid Valve: Mild to moderate regurgitation. Moderately elevated RVSP. The estimated RVSP is 48 mmHg. Left Atrium: Left atrium is mildly dilated.  LA Vol Index is 35 ml/m2. Interatrial Septum: Agitated saline study was negative with and without provocation. Right Atrium: Right atrium is mildly dilated. Technical qualifiers: Color flow Doppler was performed and pulse wave and/or continuous wave Doppler was performed. Physical Exam:  General - Well developed, well nourished, in no apparent distress. Pleasant but not conversant for much of the encounter  HEENT - Normocephalic, atraumatic. Conjunctiva are clear. Neck - Supple without masses  Extremities - Peripheral pulses intact. No edema and no rashes. Neurological examination -  Comprehension is poor but she does occasionally follow one-step commands. Attention is poor. Memory is poor. Language and speech are limited. On cranial nerve examination, (II, III, IV, VI) pupils are equal, round, and reactive to light. Unable to asses visual acuity or visual fields. Extraocular motility is normal. (V, VII) Face is symmetric and sensation is intact to light touch. (VIII) Hearing is intact. (IX, X) Palate and uvula elevate symmetrically. Voice is normal. (XI) Shoulder shrug is strong and equal bilaterally. (XII)Tongue is midline. Mildly decreased facial expression  Motor examination -: Normal muscle bulk. Muscle tone is slightly increased with bilateral cogwheel rigidity in the upper extremities. Plantar response is flexor bilaterally. Sensation is intact to light touch, pinprick, vibration and proprioception in all extremities. Cerebellar examination is normal.  A kinetic tremor is present. In addition, the patient has a rest tremor which oscillates in supination/pronation on the right    Signed By: KACY Shaw - DEVON     February 14, 2023      I spent 35 minutes today with the patient, which included chart review, documentation, and greater than 50% of time was spent in direct face-to-face contact, obtaining history, exam, coordinating care, and counseling the patient on medical condition.

## 2023-02-14 NOTE — CARE COORDINATION
LOS 4D  CM attempted to meet with patient to discuss plan for discharge. Patient in MRI. Patient's daughter at bedside. Daughter reports all the family members live on their parent's land, all supportive and available for patient's needs. Daughter reports family interested in drafting HCPOA while patient is in house. CM sent referral. CM will follow up with patient when she returns to the floor.

## 2023-02-14 NOTE — PROGRESS NOTES
Hospitalist Progress Note   Admit Date:  2023  9:26 PM   Name:  Waynette Kawasaki   Age:  81 y.o. Sex:  female  :  1940   MRN:  [id number]   Room:  [facility]/01    Presenting Complaint: Cerebrovascular Accident     Reason(s) for Admission: Stroke-like symptoms [R29.90]  Cerebrovascular accident (CVA), unspecified mechanism [facility]) [I63.9]     Hospital Course:   Waynette Kawasaki is a 81 y.o. female with medical history of AFIB on xarelto, SSS, prior CVA s/p tpa with hemorrhagic converstion in , BL COSME, GERD who presented from home via EMS with acute onset aphasia that started 2 hours PTA. She was drinking water and it spilled out of her mouth and then family noticied she had difficulty speaking. Noted to have some confusion while en route with EMS and on arrival. CODE stroke called and she was Evaluated by tele-neurology not a candidate for tpa on DOAC     Started having cold like symptoms last month and since then has had poor intact, dry cough and noticied SOB today. While in ED hypoxic with increasing oxygen requirements. Very agitated, restless and anxious. Per daughter at beside takes ativan 2 mg and metalonin at bedtime. Sna 127, K 2.9   CTH and CTA head and neck       1. No acute intracranial abnormality visible by CT. MRI has greater sensitivity    for acute infarct. 2. Extensive chronic small vessel ischemic changes throughout the white matter    and possible chronic lacunar infarct in the right basal ganglia/internal    capsule. 3. No intracranial arterial occlusion or hemodynamically significant stenosis. 4. 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 5. Severe/critical atherosclerotic stenosis of the bilateral internal carotid    arteries in the neck, measuring 90% on the right and 70% on the left by NASCET    criteria. 6. Vertebral arteries are widely patent bilaterally.    7. Moderate atherosclerotic Patient reports left hand swelling from a wasp sting on Monday. stenosis at the origins of both subclavian arteries. 8. Trace interstitial edema in the lung apices. 9. Mild inflammation in the paranasal sinuses. Rec'd labetalol 10 mg IV in ED         Subjective & 24hr Events (02/14/23): Patient was seen and examined at the bedside. Daughter at the bedside. She is off oxygen and is on RA. She was laying down on the bed. She was having tremors. Assessment & Plan:   Stroke-like symptoms - Ischemic CVA  r/o   Home antiplatelets/anticoagulation: xarelto 20   Continue Atorvastatin 80 mg  Discontinued plavix and ASA 81  -Unable to obtain MRI brain without contrast as pt is unable to stay still due to parkinsonism and tremors. Awaiting MRI with sedation on 2/14   TTE with bubble with EF 55% to 60%  patient is at high risk for neurological complications in the setting of carotid artery surgical intervention  vascular surgery following       Newly diagnosed Parkinsonism with  cognitive impairment. Clinically diagnosed with PD. Degree of bradykinesia, constipation, masking of the face and right arm tremor and recurrent falls  Continue  sinemet  Continued ST to address cognitive-linguistic treatment  Neurology following      Acute hypoxic respiratory failiure -   Resolved and is on RA   Leucocytosis resolved    Chronic hypo osmolar hyponatremia -   Aldactone and Celexa contributed to worsening hyponatremia  Currently Sodium is 127 and serum osm 265. Holding  Aldactone   Continue Urea        Hypokalemia/ Hypomagnesemia  Resolved      PAFIB // SSS - saw cardiology on 12/14 and referred to EP for PPM consideration and saw on 12/27 with plans to monitor symptoms and re-assess in 1 year. Cont. Xarelto       MR/pHTN - followed by [facility] cardiology      VIRY - cont citalopram. cont alternative agent. PT/OT recommended IRF    Diet:  ADULT DIET;  Regular  ADULT ORAL NUTRITION SUPPLEMENT; Breakfast, Lunch, Dinner; Standard High Calorie/High Protein Oral Supplement  DVT PPx: Lovenox   Code status: Full Code    Hospital Problems:  Principal Problem:    Stroke-like symptoms  Active Problems:    Hyponatremia    Hypokalemia    Acute respiratory failure with hypoxia (HCC)    Acute metabolic encephalopathy    Cerebrovascular accident (CVA) ([facility] [address]    Primary insomnia    Long term current use of anticoagulant    Generalized anxiety disorder    Gastroesophageal reflux disease without esophagitis    Pulmonary hypertension (HCC)    SSS (sick sinus syndrome) (HCC)    Permanent atrial fibrillation (HCC)    Mitral valve regurgitation    History of CVA (cerebrovascular accident)    Bilateral carotid artery disease ([facility] [address]    Pure hypercholesterolemia    Essential tremor  Resolved Problems:    * No resolved hospital problems. *      Objective:   Patient Vitals for the past 24 hrs:   Temp Pulse Resp BP SpO2   02/14/23 1205 -- -- -- (!) 157/71 --   02/14/23 0836 -- -- -- (!) 147/76 --   02/14/23 0745 97.4 °F (36.3 °C) 69 16 (!) 147/76 93 %   02/14/23 0335 97.8 °F (36.6 °C) 77 18 (!) 160/82 94 %   02/14/23 0017 98.2 °F (36.8 °C) 62 16 139/60 91 %   02/13/23 1958 98.1 °F (36.7 °C) 70 17 (!) 142/67 95 %   02/13/23 1639 98.3 °F (36.8 °C) 66 18 125/62 92 %       Oxygen Therapy  SpO2: 93 %  Pulse Oximeter Device Mode: Intermittent  O2 Device: None (Room air)  O2 Flow Rate (L/min): 2 L/min    Estimated body mass index is 27.27 kg/m² as calculated from the following:    Height as of this encounter: 5' 1\" (1.549 m). Weight as of this encounter: 144 lb 4.8 oz (65.5 kg). Intake/Output Summary (Last 24 hours) at 2/14/2023 1418  Last data filed at 2/14/2023 1048  Gross per 24 hour   Intake 1150 ml   Output 550 ml   Net 600 ml           Physical Exam:     Blood pressure (!) 157/71, pulse 69, temperature 97.4 °F (36.3 °C), temperature source Oral, resp. rate 16, height 5' 1\" (1.549 m), weight 144 lb 4.8 oz (65.5 kg), SpO2 93 %. General:    Well nourished.     Head:  Normocephalic, atraumatic  Eyes:  Sclerae appear normal.  Pupils equally round. ENT:  Nares appear normal.  Moist oral mucosa  Neck:  No restricted ROM. Trachea midline   CV:   RRR. No m/r/g. No jugular venous distension. Lungs:   CTAB. No wheezing, rhonchi, or rales. Symmetric expansion. Abdomen:   Soft, nontender, nondistended. Extremities: No cyanosis or clubbing. No edema  Skin:     No rashes and normal coloration. Warm and dry. Neuro:  CN II-XII grossly intact. Psych:  Normal mood and affect.       I have personally reviewed labs and tests:  Recent Labs:  Recent Results (from the past 48 hour(s))   PLEASE READ & DOCUMENT PPD TEST IN 48 HRS    Collection Time: 02/12/23  5:23 PM   Result Value Ref Range    PPD, (POC) Negative Negative    mm Induration 0 0 - 5 mm   CBC with Auto Differential    Collection Time: 02/13/23  6:42 AM   Result Value Ref Range    WBC 11.7 (H) 4.3 - 11.1 K/uL    RBC 4.05 4.05 - 5.2 M/uL    Hemoglobin 12.6 11.7 - 15.4 g/dL    Hematocrit 36.8 35.8 - 46.3 %    MCV 90.9 82 - 102 FL    MCH 31.1 26.1 - 32.9 PG    MCHC 34.2 31.4 - 35.0 g/dL    RDW 13.7 11.9 - 14.6 %    Platelets 030 091 - 899 K/uL    MPV 9.7 9.4 - 12.3 FL    nRBC 0.00 0.0 - 0.2 K/uL    Differential Type AUTOMATED      Seg Neutrophils 72 43 - 78 %    Lymphocytes 20 13 - 44 %    Monocytes 8 4.0 - 12.0 %    Eosinophils % 0 (L) 0.5 - 7.8 %    Basophils 0 0.0 - 2.0 %    Immature Granulocytes 0 0.0 - 5.0 %    Segs Absolute 8.3 (H) 1.7 - 8.2 K/UL    Absolute Lymph # 2.4 0.5 - 4.6 K/UL    Absolute Mono # 0.9 0.1 - 1.3 K/UL    Absolute Eos # 0.0 0.0 - 0.8 K/UL    Basophils Absolute 0.0 0.0 - 0.2 K/UL    Absolute Immature Granulocyte 0.0 0.0 - 0.5 K/UL   Basic Metabolic Panel w/ Reflex to MG    Collection Time: 02/13/23  6:42 AM   Result Value Ref Range    Sodium 123 (L) 133 - 143 mmol/L    Potassium 3.2 (L) 3.5 - 5.1 mmol/L    Chloride 89 (L) 101 - 110 mmol/L    CO2 24 21 - 32 mmol/L    Anion Gap 10 2 - 11 mmol/L    Glucose 112 (H) 65 - 100 mg/dL    BUN 8 8 - 23 MG/DL Creatinine 0.50 (L) 0.6 - 1.0 MG/DL    Est, Glom Filt Rate >60 >60 ml/min/1.73m2    Calcium 8.8 8.3 - 10.4 MG/DL   Phosphorus    Collection Time: 02/13/23  6:42 AM   Result Value Ref Range    Phosphorus 2.3 2.3 - 3.7 MG/DL   Magnesium    Collection Time: 02/13/23  6:42 AM   Result Value Ref Range    Magnesium 1.2 (L) 1.8 - 2.4 mg/dL   Basic Metabolic Panel w/ Reflex to MG    Collection Time: 02/13/23  2:31 PM   Result Value Ref Range    Sodium 125 (L) 133 - 143 mmol/L    Potassium 3.5 3.5 - 5.1 mmol/L    Chloride 90 (L) 101 - 110 mmol/L    CO2 29 21 - 32 mmol/L    Anion Gap 6 2 - 11 mmol/L    Glucose 105 (H) 65 - 100 mg/dL    BUN 13 8 - 23 MG/DL    Creatinine 0.70 0.6 - 1.0 MG/DL    Est, Glom Filt Rate >60 >60 ml/min/1.73m2    Calcium 8.9 8.3 - 10.4 MG/DL   Magnesium    Collection Time: 02/13/23  2:31 PM   Result Value Ref Range    Magnesium 1.7 (L) 1.8 - 2.4 mg/dL   Osmolality    Collection Time: 02/13/23  6:29 PM   Result Value Ref Range    Serum Osmolality 265 (L) 280 - 301 MOSM/kg H2O   CBC with Auto Differential    Collection Time: 02/14/23  5:30 AM   Result Value Ref Range    WBC 6.9 4.3 - 11.1 K/uL    RBC 3.98 (L) 4.05 - 5.2 M/uL    Hemoglobin 12.4 11.7 - 15.4 g/dL    Hematocrit 36.4 35.8 - 46.3 %    MCV 91.5 82 - 102 FL    MCH 31.2 26.1 - 32.9 PG    MCHC 34.1 31.4 - 35.0 g/dL    RDW 14.1 11.9 - 14.6 %    Platelets 318 843 - 981 K/uL    MPV 9.3 (L) 9.4 - 12.3 FL    nRBC 0.00 0.0 - 0.2 K/uL    Differential Type AUTOMATED      Seg Neutrophils 61 43 - 78 %    Lymphocytes 29 13 - 44 %    Monocytes 9 4.0 - 12.0 %    Eosinophils % 1 0.5 - 7.8 %    Basophils 0 0.0 - 2.0 %    Immature Granulocytes 0 0.0 - 5.0 %    Segs Absolute 4.1 1.7 - 8.2 K/UL    Absolute Lymph # 2.0 0.5 - 4.6 K/UL    Absolute Mono # 0.7 0.1 - 1.3 K/UL    Absolute Eos # 0.1 0.0 - 0.8 K/UL    Basophils Absolute 0.0 0.0 - 0.2 K/UL    Absolute Immature Granulocyte 0.0 0.0 - 0.5 K/UL   Basic Metabolic Panel w/ Reflex to MG    Collection Time: 02/14/23  5:30 AM   Result Value Ref Range    Sodium 127 (L) 133 - 143 mmol/L    Potassium 3.5 3.5 - 5.1 mmol/L    Chloride 92 (L) 101 - 110 mmol/L    CO2 28 21 - 32 mmol/L    Anion Gap 7 2 - 11 mmol/L    Glucose 97 65 - 100 mg/dL    BUN 15 8 - 23 MG/DL    Creatinine 0.70 0.6 - 1.0 MG/DL    Est, Glom Filt Rate >60 >60 ml/min/1.73m2    Calcium 8.7 8.3 - 10.4 MG/DL   Phosphorus    Collection Time: 02/14/23  5:30 AM   Result Value Ref Range    Phosphorus 3.1 2.3 - 3.7 MG/DL   Magnesium    Collection Time: 02/14/23  5:30 AM   Result Value Ref Range    Magnesium 1.9 1.8 - 2.4 mg/dL       I have personally reviewed imaging studies:  Other Studies:  MRI brain without contrast   Final Result      Small foci of acute/subacute infarct in the right anterior paramedian frontal   lobe. Moderate chronic white matter microvascular ischemic changes. XR CHEST PORTABLE   Final Result      Mild pulmonary edema with probable small bilateral pleural effusions. XR CHEST PORTABLE   Final Result      1. Cardiomegaly with mild pulmonary edema. Manda Chang M.D.    2/9/2023 10:56:00 PM      CT HEAD WO CONTRAST   Final Result      1. No acute intracranial abnormality visible by CT. MRI has greater sensitivity    for acute infarct. 2. Extensive chronic small vessel ischemic changes throughout the white matter    and possible chronic lacunar infarct in the right basal ganglia/internal    capsule. 3. No intracranial arterial occlusion or hemodynamically significant stenosis. 4. 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 5. Severe/critical atherosclerotic stenosis of the bilateral internal carotid    arteries in the neck, measuring 90% on the right and 70% on the left by NASCET    criteria. 6. Vertebral arteries are widely patent bilaterally. 7. Moderate atherosclerotic stenosis at the origins of both subclavian arteries. 8. Trace interstitial edema in the lung apices. 9. Mild inflammation in the paranasal sinuses. Discussed with Dr. Annetta Rose at 16 p.m. on 2/9/2023            This examination was interpreted by Dinh Snyder M.D. Dinh Snyder M.D.    2/9/2023 10:20:00 PM      CTA HEAD NECK W CONTRAST   Final Result      1. No acute intracranial abnormality visible by CT. MRI has greater sensitivity    for acute infarct. 2. Extensive chronic small vessel ischemic changes throughout the white matter    and possible chronic lacunar infarct in the right basal ganglia/internal    capsule. 3. No intracranial arterial occlusion or hemodynamically significant stenosis. 4. 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 5. Severe/critical atherosclerotic stenosis of the bilateral internal carotid    arteries in the neck, measuring 90% on the right and 70% on the left by NASCET    criteria. 6. Vertebral arteries are widely patent bilaterally. 7. Moderate atherosclerotic stenosis at the origins of both subclavian arteries. 8. Trace interstitial edema in the lung apices. 9. Mild inflammation in the paranasal sinuses. Discussed with Dr. Annetta Rose at 16 p.m. on 2/9/2023            This examination was interpreted by MERI Kirkpatrick M.D.    2/9/2023 10:20:00 PM          Current Meds:  Current Facility-Administered Medications   Medication Dose Route Frequency    magnesium oxide (MAG-OX) tablet 400 mg  400 mg Oral Daily    tuberculin injection 5 Units  5 Units IntraDERmal Once    amLODIPine (NORVASC) tablet 10 mg  10 mg Oral Daily    urea (URE-NA) packet 15 g  15 g Oral Daily    LORazepam (ATIVAN) tablet 1 mg  1 mg Oral Nightly PRN    lisinopril (PRINIVIL;ZESTRIL) tablet 40 mg  40 mg Oral Daily    carbidopa-levodopa (SINEMET)  MG per tablet 0.5 tablet  0.5 tablet Oral TID    [Held by provider] furosemide (LASIX) injection 40 mg  40 mg IntraVENous Daily    [Held by provider] aspirin EC tablet 81 mg  81 mg Oral Daily    atorvastatin (LIPITOR) tablet 80 mg  80 mg Oral Nightly    citalopram (CELEXA) tablet 20 mg  20 mg Oral Daily    pantoprazole (PROTONIX) tablet 40 mg  40 mg Oral QAM AC    rivaroxaban (XARELTO) tablet 20 mg  20 mg Oral Dinner    spironolactone (ALDACTONE) tablet 25 mg  25 mg Oral Daily    ondansetron (ZOFRAN-ODT) disintegrating tablet 4 mg  4 mg Oral Q8H PRN    Or    ondansetron (ZOFRAN) injection 4 mg  4 mg IntraVENous Q6H PRN    polyethylene glycol (GLYCOLAX) packet 17 g  17 g Oral Daily PRN    acetaminophen (TYLENOL) tablet 650 mg  650 mg Oral Q4H PRN    Or    acetaminophen (TYLENOL) suppository 650 mg  650 mg Rectal Q4H PRN    labetalol (NORMODYNE;TRANDATE) injection 10 mg  10 mg IntraVENous Q10 Min PRN    magnesium sulfate 2000 mg in 50 mL IVPB premix  2,000 mg IntraVENous PRN    potassium chloride (KLOR-CON M) extended release tablet 40 mEq  40 mEq Oral PRN    Or    potassium bicarb-citric acid (EFFER-K) effervescent tablet 40 mEq  40 mEq Oral PRN    Or    potassium chloride 10 mEq/100 mL IVPB (Peripheral Line)  10 mEq IntraVENous PRN    0.9 % sodium chloride bolus  100 mL IntraVENous ONCE PRN       Signed:  Fran Corea MD    Part of this note may have been written by using a voice dictation software. The note has been proof read but may still contain some grammatical/other typographical errors.

## 2023-02-14 NOTE — PROGRESS NOTES
Comprehensive Nutrition Assessment    Type and Reason for Visit: Reassess  Malnutrition Screening Tool: Malnutrition Screen  Have you recently lost weight without trying?: 2 to 13 pounds (1 point)  Have you been eating poorly because of a decreased appetite?: Yes (1 point)  Malnutrition Screening Tool Score: 2    Nutrition Recommendations/Plan:   Meals and Snacks:  Diet: Continue current order - Regular  Nutrition Supplement Therapy:  Medical food supplement therapy:  Continue Ensure Enlive three times per day (this provides 350 kcal and 20 grams protein per bottle)     Malnutrition Assessment:  Malnutrition Status: At risk for malnutrition (Comment) (d/t decreased po intake for the last few weeks (per family), mostly consuming liquids only)  no adrien wasting of fat or muscle stores    Nutrition Assessment:  Nutrition History: Pt unable to discuss nutrition hx with RD at time of visit d/t aphasia. Family ( and daughter) present at bedside provide hx.  states pt has been eating less over the last year, but this has been her new normal. States she eats 2 small meals and one bigger meal (dinner) daily.  reports having Boost on hand at home so that if pt misses a meal, he can have her drink a boost. He states that she has had a more recent decrease in po intake in the last few weeks and now is mostly consuming only liquids.  and daughter report UBW of 150-160 lbs. Daughter states pt had an office weight a few weeks ago of 140 lbs, but RD was unable to confirm via EMR. Unsure of exact extent of weight loss at this time but  states he has also noticed some weight loss.  lbs on admission. Do You Have Any Cultural, Islam, or Ethnic Food Preferences?: No   Nutrition Background:       PMH significant for CVA, GERD, depression, hypercholesterolemia, HTN, and mitral valve regurgitation. Pt presents this admission for aphasia.    Nutrition Interval:   RD met w/pt and family in room. Pt did not participate in conversation during visit. Family reports pt is now eating ~50% of meals and drinks at least one ensure supplement daily.  confirms that pts po intake has improved quite a bit since PTA. RD encouraged continued consumption of nutrition supplements throughout remainder of admission and when discharged. Current Nutrition Therapies:  ADULT DIET; Regular  ADULT ORAL NUTRITION SUPPLEMENT; Breakfast, Lunch, Dinner; Standard High Calorie/High Protein Oral Supplement    Current Intake:   Average Meal Intake: 26-50%, 51-75% (~50% of meals per family) Average Supplements Intake: 26-50% (~1 ensure daily per family)      Anthropometric Measures:  Height: 5' 1\" (154.9 cm)  Current Body Wt: 144 lb 6.4 oz (65.5 kg) (2/14), Weight source: Standing Scale  BMI: 27.3, Overweight (BMI 25.0-29. 9)  Admission Body Weight: 158 lb 1.1 oz (71.7 kg)  Ideal Body Weight (Kg) (Calculated): 48 kg (105 lbs), 137.5 %  Usual Body Wt:  (150-160 lbs per family), Percent weight change:         BMI Category Overweight (BMI 25.0-29. 9)    Estimated Daily Nutrient Needs:  Energy (kcal/day): 1191-4570 (18-22 kcal/kg) (Kcal/kg Weight used: 71.7 kg Current  Protein (g/day): 72-86 (1-1.2 g/kg) Weight Used: (Current) 71.7 kg  Fluid (ml/day):   (1 ml/kcal)    Nutrition Diagnosis:   Inadequate oral intake related to  (decreased appetite) as evidenced by poor intake prior to admission, intake 26-50%, intake 51-75%    Nutrition Interventions:   Food and/or Nutrient Delivery: Continue Current Diet, Continue Oral Nutrition Supplement  Nutrition Education/Counseling: No recommendation at this time  Coordination of Nutrition Care: Continue to monitor while inpatient  Plan of Care discussed with: family present in room (, daughter, etc)    Goals:   Previous Goal Met: Goal(s) Achieved  Active Goal: Meet at least 75% of estimated needs, by next RD assessment       Nutrition Monitoring and Evaluation:      Food/Nutrient Intake Outcomes: Supplement Intake, Food and Nutrient Intake  Physical Signs/Symptoms Outcomes: Weight, Meal Time Behavior    Discharge Planning:    Continue current diet    KAILEY CAMARILLO RD

## 2023-02-14 NOTE — PROGRESS NOTES
[address]   [address] [facility] FAX: [phone]    Cecilia Garza  : 1940      Subjective: Family reports patient did not have a \"good\" night last night as she was confused and combative when attempting to take her to the restroom. Patient slept through most of interview today, woke up to say \"hi\" and then went back to sleep. Plan: f/u MRI,  Repeat discussion with family regarding cognitive and functional decline over the last month, at this time I would not recommend surgical intervention. If her condition were to improve and cognitive and functional impairment are transient, will re-evaluate. Recommend optimal medical management with BP control, DAPT and Statin. Imaging interpreted: MRI pending    Smoker:  Tobacco Use      Smoking status: Never      Smokeless tobacco: Never      Complexity of illness:  HLD, Afib, and CVA    Procedures by [facility]: None    Referred by: No ref. provider found    KACY Durham NP     Statin: Yes     DAPT/AC: ASA, Plavix, and Xarelto    Dye allergy: no    Metal implants or AICD: no     Constitutional:   Negative for fevers and unexplained weight loss. Eyes:   Negative for visual disturbance. ENT:   Negative for significant hearing loss and tinnitus. Respiratory:   Negative for hemoptysis. Cardiovascular:   Negative except as noted in HPI. Gastrointestinal:   Negative for melena and abdominal pain. Genitourinary:   Negative for hematuria, renal stones. Integumentary:   Negative for rash or non-healing wounds  Hematologic/Lymphatic:   Negative for excessive bleeding hx or clotting disorder. Musculoskeletal:  Negative for active, unexplained/severe joint pain. Neurological:   Negative for stroke. Behavioral/Psych:   Negative for suicidal ideations. Endocrine:   Negative for uncontrolled diabetic symptoms including polyuria, polydipsia and poor wound healing.      Physical Examination:   BP: (!) 142/67, Pain 0-10: Pain Level: 4, Location: Lower back; General: no distress. HENT: AT  CV: irregularly irregular, normal rate  LUNG: Effort normal and breath sounds normal. No respiratory distress. Abdominal: non-distended  Extremities: no wounds or edema  Neuro: motor grossly intact    Nehemiah Jimenez MD    Elements of this note have been dictated using speech recognition software. As a result, errors of speech recognition may have occurred.

## 2023-02-14 NOTE — H&P
Patient: Kaden Campos MRN: [id number]  SSN: [id number]    YOB: 1940  Age: 81 y.o. Sex: female      History and Physical    Kaden Campos is a 81 y.o. female having . Allergies: No Known Allergies     Past Medical History:   Diagnosis Date    Carotid stenosis     With infarct    Cerebral vascular accident [facility]) 07/2015    CVA (cerebrovascular accident) ([facility]     Depression     GERD (gastroesophageal reflux disease)     Hypercholesterolemia     Hypertension     Mitral valve regurgitation     Paroxysmal atrial fibrillation ([facility] [address]     Pulmonary hypertension (HCC)     SSS (sick sinus syndrome) ([facility] [address]     Tachycardia-bradycardia    Stroke ([facility] [address] 2015      Past Surgical History:   Procedure Laterality Date    COLONOSCOPY  2011    GYN      sharonda--years ago    ORTHOPEDIC SURGERY  2013    left total knee      Family History   Problem Relation Age of Onset    Stroke Mother     Heart Disease Father       Social History     Tobacco Use    Smoking status: Never    Smokeless tobacco: Never   Substance Use Topics    Alcohol use: No     Alcohol/week: 0.0 standard drinks        Prior to Admission medications    Medication Sig Start Date End Date Taking? Authorizing Provider   LORazepam (ATIVAN) 1 MG tablet Take 1 mg by mouth as needed.     Historical Provider, MD   mirtazapine (REMERON) 7.5 MG tablet Take 1 tablet by mouth nightly  Patient not taking: Reported on 2/10/2023 1/13/23   Tammi Valles MD   citalopram (CELEXA) 20 MG tablet Take 1 tablet by mouth daily 12/9/22   Tammi Valles MD   omeprazole (PRILOSEC) 20 MG delayed release capsule Take 1 capsule by mouth daily 12/9/22   Tammi Valles MD   amLODIPine (NORVASC) 5 MG tablet Take 5 mg by mouth daily 3/28/22   Ar Automatic Reconciliation   ascorbic acid (VITAMIN C) 250 MG tablet Take by mouth    Ar Automatic Reconciliation   aspirin 81 MG EC tablet Take by mouth daily    Ar Automatic Reconciliation   atorvastatin (LIPITOR) 40 MG tablet Take 40 mg by mouth daily 12/9/21   Ar Automatic Reconciliation   lisinopril (PRINIVIL;ZESTRIL) 20 MG tablet Take 20 mg by mouth 2 times daily 3/28/22   Ar Automatic Reconciliation   rivaroxaban (XARELTO) 20 MG TABS tablet Take 20 mg by mouth Daily with supper 3/28/22   Ar Automatic Reconciliation   spironolactone (ALDACTONE) 25 MG tablet Take 25 mg by mouth daily 3/28/22   Ar Automatic Reconciliation        Visit Vitals  BP (!) 183/79   Pulse 74   Temp 98 °F (36.7 °C) (Temporal)   Resp 18   Ht 5' 1\" (1.549 m)   Wt 144 lb (65.3 kg)   SpO2 93%   BMI 27.21 kg/m²        Physical Exam:                        Signed By: Lisa Wilkins MD     February 14, 2023

## 2023-02-14 NOTE — PERIOP NOTE
TRANSFER - OUT REPORT:    Verbal report given to Aldo Garcia RN on Ladi Babin  being transferred to [facility] for routine post-op       Report consisted of patients Situation, Background, Assessment and   Recommendations(SBAR). Information from the following report(s) Nurse Handoff Report, Intake/Output, MAR, Cardiac Rhythm A/fib, and Quality Measures was reviewed with the receiving nurse. Lines:   Peripheral IV 02/09/23 Right Antecubital (Active)   Site Assessment Clean, dry & intact 02/14/23 1030   Line Status Normal saline locked 02/14/23 [facility] Connections checked and tightened;Ports disinfected;Cap changed 02/14/23 1030   Phlebitis Assessment No symptoms 02/14/23 1030   Infiltration Assessment 0 02/14/23 1030   Alcohol Cap Used Yes 02/13/23 2100   Dressing Status Clean, dry & intact 02/14/23 1030   Dressing Type Transparent 02/14/23 1030        Opportunity for questions and clarification was provided. Patient transported with:   O2 @ 2 liters  Tech    VTE prophylaxis orders have been written for Ladi Babin. Patient and family given floor number and nurses name. Family updated re: pt status after security code verified.

## 2023-02-14 NOTE — ANESTHESIA PRE PROCEDURE
Department of Anesthesiology  Preprocedure Note       Name:  Celina Corporal   Age:  81 y.o.  :  1940                                          MRN:  [id number]         Date:  2023      Surgeon: * No surgeons listed *    Procedure: * No procedures listed *    Medications prior to admission:   Prior to Admission medications    Medication Sig Start Date End Date Taking? Authorizing Provider   LORazepam (ATIVAN) 1 MG tablet Take 1 mg by mouth as needed. Historical Provider, MD   mirtazapine (REMERON) 7.5 MG tablet Take 1 tablet by mouth nightly  Patient not taking: Reported on 2/10/2023 1/13/23   Gayla Ascencio MD   citalopram (CELEXA) 20 MG tablet Take 1 tablet by mouth daily 22   Gayla Ascencio MD   omeprazole (PRILOSEC) 20 MG delayed release capsule Take 1 capsule by mouth daily 22   Gayla Ascencio MD   amLODIPine (NORVASC) 5 MG tablet Take 5 mg by mouth daily 3/28/22   Ar Automatic Reconciliation   ascorbic acid (VITAMIN C) 250 MG tablet Take by mouth    Ar Automatic Reconciliation   aspirin 81 MG EC tablet Take by mouth daily    Ar Automatic Reconciliation   atorvastatin (LIPITOR) 40 MG tablet Take 40 mg by mouth daily 21   Ar Automatic Reconciliation   lisinopril (PRINIVIL;ZESTRIL) 20 MG tablet Take 20 mg by mouth 2 times daily 3/28/22   Ar Automatic Reconciliation   rivaroxaban (XARELTO) 20 MG TABS tablet Take 20 mg by mouth Daily with supper 3/28/22   Ar Automatic Reconciliation   spironolactone (ALDACTONE) 25 MG tablet Take 25 mg by mouth daily 3/28/22   Ar Automatic Reconciliation       Current medications:    No current facility-administered medications for this encounter. No current outpatient medications on file.      Facility-Administered Medications Ordered in Other Encounters   Medication Dose Route Frequency Provider Last Rate Last Admin    magnesium oxide (MAG-OX) tablet 400 mg  400 mg Oral Daily Fran Corea MD        amLODIPine (NORVASC) tablet 10 mg  10 mg Oral Daily Sharon Dumont MD   10 mg at 02/14/23 0836    urea (URE-NA) packet 15 g  15 g Oral Daily Rocehlle Schwartz MD   15 g at 02/13/23 1345    LORazepam (ATIVAN) tablet 1 mg  1 mg Oral Nightly PRN Monica Brumfield MD   1 mg at 02/13/23 2029    lisinopril (PRINIVIL;ZESTRIL) tablet 40 mg  40 mg Oral Daily Sharon Dumont MD   40 mg at 02/13/23 0841    carbidopa-levodopa (SINEMET)  MG per tablet 0.5 tablet  0.5 tablet Oral TID Nuzhat Ansari MD   0.5 tablet at 02/14/23 0836    [Held by provider] furosemide (LASIX) injection 40 mg  40 mg IntraVENous Daily Sharon Dumont MD   40 mg at 02/12/23 0848    [Held by provider] aspirin EC tablet 81 mg  81 mg Oral Daily Marimar Ponds, DO   81 mg at 02/12/23 0847    atorvastatin (LIPITOR) tablet 80 mg  80 mg Oral Nightly Marimar Ponds, DO   80 mg at 02/13/23 2026    citalopram (CELEXA) tablet 20 mg  20 mg Oral Daily Marimar Ponds, DO   20 mg at 02/14/23 0836    pantoprazole (PROTONIX) tablet 40 mg  40 mg Oral QAM AC Marimar Ponds, DO   40 mg at 02/14/23 0749    rivaroxaban (XARELTO) tablet 20 mg  20 mg Oral Dinner Marimar Ponds, DO   20 mg at 02/13/23 1708    spironolactone (ALDACTONE) tablet 25 mg  25 mg Oral Daily Abeba Lion MD   25 mg at 02/13/23 0841    ondansetron (ZOFRAN-ODT) disintegrating tablet 4 mg  4 mg Oral Q8H PRN Marimar Ponds, DO   4 mg at 02/12/23 2126    Or    ondansetron (ZOFRAN) injection 4 mg  4 mg IntraVENous Q6H PRN Marimar Ponds, DO   4 mg at 02/10/23 0256    polyethylene glycol (GLYCOLAX) packet 17 g  17 g Oral Daily PRN Marimar Ponds, DO        acetaminophen (TYLENOL) tablet 650 mg  650 mg Oral Q4H PRN Marimar Ponds, DO   650 mg at 02/13/23 2031    Or    acetaminophen (TYLENOL) suppository 650 mg  650 mg Rectal Q4H PRN Marimar Summers, DO        labetalol (NORMODYNE;TRANDATE) injection 10 mg  10 mg IntraVENous Q10 Min PRN Marimar Summers,         magnesium sulfate 2000 mg in 50 mL IVPB premix  2,000 mg IntraVENous PRN Marimar Summers, DO  potassium chloride (KLOR-CON M) extended release tablet 40 mEq  40 mEq Oral PRN Minda Zuniga DO        Or    potassium bicarb-citric acid (EFFER-K) effervescent tablet 40 mEq  40 mEq Oral PRN Minda Zuniga DO        Or    potassium chloride 10 mEq/100 mL IVPB (Peripheral Line)  10 mEq IntraVENous PRN Minda Zuniga DO        0.9 % sodium chloride bolus  100 mL IntraVENous ONCE PRN Noah Ponce MD           Allergies:  No Known Allergies    Problem List:    Patient Active Problem List   Diagnosis Code    Longstanding persistent atrial fibrillation (HCC) I48.11    Primary insomnia F51.01    Iron deficiency anemia D50.9    Long term current use of anticoagulant Z79.01    Generalized anxiety disorder F41.1    Gastroesophageal reflux disease without esophagitis K21.9    Pulmonary hypertension (HCC) I27.20    SSS (sick sinus syndrome) ([facility] I49.5    Permanent atrial fibrillation ([facility]) I48.21    Mitral valve regurgitation I34.0    History of CVA (cerebrovascular accident) Z86.73    Bilateral carotid artery disease ([facility]) I77.9    Pure hypercholesterolemia E78.00    Bilateral carotid artery stenosis I65.23    Essential tremor G25.0    Stroke-like symptoms R29.90    Hyponatremia E87.1    Hypokalemia E87.6    Acute respiratory failure with hypoxia ([facility]) J96.01    Acute metabolic encephalopathy J50.79    Cerebrovascular accident (CVA) ([address] I63.9       Past Medical History:        Diagnosis Date    Carotid stenosis     With infarct    Cerebral vascular accident ([address] 07/2015    CVA (cerebrovascular accident) ([address]     Depression     GERD (gastroesophageal reflux disease)     Hypercholesterolemia     Hypertension     Mitral valve regurgitation     Paroxysmal atrial fibrillation (HCC)     Pulmonary hypertension (HCC)     SSS (sick sinus syndrome) ([address]     Tachycardia-bradycardia    Stroke ([address] 2015       Past Surgical History:        Procedure Laterality Date    COLONOSCOPY  2011  GYN      sharonda--years ago    ORTHOPEDIC SURGERY  2013    left total knee       Social History:    Social History     Tobacco Use    Smoking status: Never    Smokeless tobacco: Never   Substance Use Topics    Alcohol use: No     Alcohol/week: 0.0 standard drinks                                Counseling given: Not Answered      Vital Signs (Current):   Vitals:    02/14/23 0904   BP: (!) 183/79   Pulse: 74   Resp: 18   Temp: 98 °F (36.7 °C)   TempSrc: Temporal   SpO2: 93%   Weight: 144 lb (65.3 kg)   Height: 5' 1\" (1.549 m)                                              BP Readings from Last 3 Encounters:   02/14/23 (!) 147/76   02/14/23 (!) 183/79   12/27/22 128/82       NPO Status: Time of last liquid consumption: 0000                        Time of last solid consumption: 0000                        Date of last liquid consumption: 02/13/23                        Date of last solid food consumption: 02/13/23    BMI:   Wt Readings from Last 3 Encounters:   02/14/23 144 lb 4.8 oz (65.5 kg)   02/14/23 144 lb (65.3 kg)   12/27/22 158 lb (71.7 kg)     Body mass index is 27.21 kg/m².     CBC:   Lab Results   Component Value Date/Time    WBC 6.9 02/14/2023 05:30 AM    RBC 3.98 02/14/2023 05:30 AM    HGB 12.4 02/14/2023 05:30 AM    HCT 36.4 02/14/2023 05:30 AM    MCV 91.5 02/14/2023 05:30 AM    RDW 14.1 02/14/2023 05:30 AM     02/14/2023 05:30 AM       CMP:   Lab Results   Component Value Date/Time     02/14/2023 05:30 AM    K 3.5 02/14/2023 05:30 AM    CL 92 02/14/2023 05:30 AM    CO2 28 02/14/2023 05:30 AM    BUN 15 02/14/2023 05:30 AM    CREATININE 0.70 02/14/2023 05:30 AM    GFRAA >60 06/06/2022 09:30 AM    AGRATIO 1.7 12/02/2021 09:20 AM    LABGLOM >60 02/14/2023 05:30 AM    GLUCOSE 97 02/14/2023 05:30 AM    PROT 7.2 02/09/2023 09:37 PM    CALCIUM 8.7 02/14/2023 05:30 AM    BILITOT 0.6 02/09/2023 09:37 PM    ALKPHOS 82 02/09/2023 09:37 PM    ALKPHOS 113 12/02/2021 09:20 AM    AST 25 02/09/2023 09:37 PM ALT 36 02/09/2023 09:37 PM       POC Tests: No results for input(s): POCGLU, POCNA, POCK, POCCL, POCBUN, POCHEMO, POCHCT in the last 72 hours. Coags:   Lab Results   Component Value Date/Time    PROTIME 14.0 02/09/2023 09:37 PM    PROTIME 18.5 02/09/2023 09:37 PM    INR 1.2 02/09/2023 09:37 PM    INR 1.5 02/09/2023 09:37 PM       HCG (If Applicable): No results found for: PREGTESTUR, PREGSERUM, HCG, HCGQUANT     ABGs: No results found for: PHART, PO2ART, TLR4TXH, NNU4JQA, BEART, V0UUMDOQ     Type & Screen (If Applicable):  No results found for: LABABO, LABRH    Drug/Infectious Status (If Applicable):  No results found for: HIV, HEPCAB    COVID-19 Screening (If Applicable):   Lab Results   Component Value Date/Time    COVID19 Not detected 02/10/2023 01:50 AM           Anesthesia Evaluation  Patient summary reviewed and Nursing notes reviewed no history of anesthetic complications:   Airway: Mallampati: II  TM distance: >3 FB   Neck ROM: full  Mouth opening: > = 3 FB   Dental: normal exam         Pulmonary:Negative Pulmonary ROS breath sounds clear to auscultation                             Cardiovascular:  Exercise tolerance: poor (<4 METS),   (+) hypertension:, dysrhythmias (on Xarelto, SSS - discussion of pacemaker): atrial fibrillation, hyperlipidemia        Rhythm: irregular  Rate: normal                 ROS comment: Echo - normal EF, mod MR, mild-mod TR, mild pulm HTN     Neuro/Psych:   (+) CVA (Admitted 2/9 with aphasia, confusion. CTA shows R ICA 90% stenosis, L ICA 70% stenosis. Pt had previous CVA 2015):,              ROS comment: Parkinson's dz GI/Hepatic/Renal: Neg GI/Hepatic/Renal ROS            Endo/Other: Negative Endo/Other ROS                    Abdominal:             Vascular: negative vascular ROS. Other Findings:           Anesthesia Plan      TIVA     ASA 3       Induction: intravenous.       Anesthetic plan and risks discussed with patient and child/children.                         Berenice Neves MD   2/14/2023

## 2023-02-15 LAB
ANION GAP SERPL CALC-SCNC: 8 MMOL/L (ref 2–11)
ANION GAP SERPL CALC-SCNC: 9 MMOL/L (ref 2–11)
BASOPHILS # BLD: 0 K/UL (ref 0–0.2)
BASOPHILS NFR BLD: 0 % (ref 0–2)
BUN SERPL-MCNC: 12 MG/DL (ref 8–23)
BUN SERPL-MCNC: 9 MG/DL (ref 8–23)
CALCIUM SERPL-MCNC: 8.5 MG/DL (ref 8.3–10.4)
CALCIUM SERPL-MCNC: 8.8 MG/DL (ref 8.3–10.4)
CHLORIDE SERPL-SCNC: 93 MMOL/L (ref 101–110)
CHLORIDE SERPL-SCNC: 94 MMOL/L (ref 101–110)
CO2 SERPL-SCNC: 27 MMOL/L (ref 21–32)
CO2 SERPL-SCNC: 27 MMOL/L (ref 21–32)
CREAT SERPL-MCNC: 0.6 MG/DL (ref 0.6–1)
CREAT SERPL-MCNC: 0.9 MG/DL (ref 0.6–1)
DIFFERENTIAL METHOD BLD: ABNORMAL
EOSINOPHIL # BLD: 0.4 K/UL (ref 0–0.8)
EOSINOPHIL NFR BLD: 5 % (ref 0.5–7.8)
ERYTHROCYTE [DISTWIDTH] IN BLOOD BY AUTOMATED COUNT: 14.2 % (ref 11.9–14.6)
GLUCOSE SERPL-MCNC: 94 MG/DL (ref 65–100)
GLUCOSE SERPL-MCNC: 97 MG/DL (ref 65–100)
HCT VFR BLD AUTO: 35.7 % (ref 35.8–46.3)
HGB BLD-MCNC: 12.2 G/DL (ref 11.7–15.4)
IMM GRANULOCYTES # BLD AUTO: 0 K/UL (ref 0–0.5)
IMM GRANULOCYTES NFR BLD AUTO: 0 % (ref 0–5)
LYMPHOCYTES # BLD: 2.4 K/UL (ref 0.5–4.6)
LYMPHOCYTES NFR BLD: 32 % (ref 13–44)
MAGNESIUM SERPL-MCNC: 1.6 MG/DL (ref 1.8–2.4)
MAGNESIUM SERPL-MCNC: 2.6 MG/DL (ref 1.8–2.4)
MCH RBC QN AUTO: 31.6 PG (ref 26.1–32.9)
MCHC RBC AUTO-ENTMCNC: 34.2 G/DL (ref 31.4–35)
MCV RBC AUTO: 92.5 FL (ref 82–102)
MONOCYTES # BLD: 0.7 K/UL (ref 0.1–1.3)
MONOCYTES NFR BLD: 9 % (ref 4–12)
NEUTS SEG # BLD: 4.1 K/UL (ref 1.7–8.2)
NEUTS SEG NFR BLD: 54 % (ref 43–78)
NRBC # BLD: 0 K/UL (ref 0–0.2)
PHOSPHATE SERPL-MCNC: 2.9 MG/DL (ref 2.3–3.7)
PLATELET # BLD AUTO: 263 K/UL (ref 150–450)
PMV BLD AUTO: 9.3 FL (ref 9.4–12.3)
POTASSIUM SERPL-SCNC: 3.1 MMOL/L (ref 3.5–5.1)
POTASSIUM SERPL-SCNC: 3.3 MMOL/L (ref 3.5–5.1)
RBC # BLD AUTO: 3.86 M/UL (ref 4.05–5.2)
SODIUM SERPL-SCNC: 128 MMOL/L (ref 133–143)
SODIUM SERPL-SCNC: 130 MMOL/L (ref 133–143)
WBC # BLD AUTO: 7.6 K/UL (ref 4.3–11.1)

## 2023-02-15 PROCEDURE — 80048 BASIC METABOLIC PNL TOTAL CA: CPT

## 2023-02-15 PROCEDURE — 6370000000 HC RX 637 (ALT 250 FOR IP): Performed by: FAMILY MEDICINE

## 2023-02-15 PROCEDURE — 1100000003 HC PRIVATE W/ TELEMETRY

## 2023-02-15 PROCEDURE — 97530 THERAPEUTIC ACTIVITIES: CPT

## 2023-02-15 PROCEDURE — 6370000000 HC RX 637 (ALT 250 FOR IP): Performed by: PSYCHIATRY & NEUROLOGY

## 2023-02-15 PROCEDURE — 85025 COMPLETE CBC W/AUTO DIFF WBC: CPT

## 2023-02-15 PROCEDURE — 36415 COLL VENOUS BLD VENIPUNCTURE: CPT

## 2023-02-15 PROCEDURE — 6370000000 HC RX 637 (ALT 250 FOR IP): Performed by: INTERNAL MEDICINE

## 2023-02-15 PROCEDURE — 83735 ASSAY OF MAGNESIUM: CPT

## 2023-02-15 PROCEDURE — 6370000000 HC RX 637 (ALT 250 FOR IP): Performed by: HOSPITALIST

## 2023-02-15 PROCEDURE — 84100 ASSAY OF PHOSPHORUS: CPT

## 2023-02-15 PROCEDURE — 6370000000 HC RX 637 (ALT 250 FOR IP): Performed by: STUDENT IN AN ORGANIZED HEALTH CARE EDUCATION/TRAINING PROGRAM

## 2023-02-15 PROCEDURE — 6360000002 HC RX W HCPCS: Performed by: STUDENT IN AN ORGANIZED HEALTH CARE EDUCATION/TRAINING PROGRAM

## 2023-02-15 RX ORDER — POTASSIUM CHLORIDE 20 MEQ/1
40 TABLET, EXTENDED RELEASE ORAL ONCE
Status: COMPLETED | OUTPATIENT
Start: 2023-02-15 | End: 2023-02-15

## 2023-02-15 RX ORDER — POTASSIUM CHLORIDE 20 MEQ/1
40 TABLET, EXTENDED RELEASE ORAL 2 TIMES DAILY WITH MEALS
Status: DISPENSED | OUTPATIENT
Start: 2023-02-15 | End: 2023-02-16

## 2023-02-15 RX ORDER — MAGNESIUM SULFATE IN WATER 40 MG/ML
2000 INJECTION, SOLUTION INTRAVENOUS ONCE
Status: COMPLETED | OUTPATIENT
Start: 2023-02-15 | End: 2023-02-15

## 2023-02-15 RX ADMIN — ATORVASTATIN CALCIUM 80 MG: 40 TABLET, FILM COATED ORAL at 20:44

## 2023-02-15 RX ADMIN — AMLODIPINE BESYLATE 10 MG: 10 TABLET ORAL at 08:18

## 2023-02-15 RX ADMIN — CITALOPRAM HYDROBROMIDE 20 MG: 20 TABLET ORAL at 08:16

## 2023-02-15 RX ADMIN — ACETAMINOPHEN 650 MG: 325 TABLET ORAL at 08:26

## 2023-02-15 RX ADMIN — POTASSIUM CHLORIDE 40 MEQ: 1500 TABLET, EXTENDED RELEASE ORAL at 15:33

## 2023-02-15 RX ADMIN — CARBIDOPA AND LEVODOPA 0.5 TABLET: 25; 100 TABLET ORAL at 08:16

## 2023-02-15 RX ADMIN — POTASSIUM CHLORIDE 40 MEQ: 1500 TABLET, EXTENDED RELEASE ORAL at 17:59

## 2023-02-15 RX ADMIN — CARBIDOPA AND LEVODOPA 0.5 TABLET: 25; 100 TABLET ORAL at 13:23

## 2023-02-15 RX ADMIN — CARBIDOPA AND LEVODOPA 0.5 TABLET: 25; 100 TABLET ORAL at 20:44

## 2023-02-15 RX ADMIN — POTASSIUM CHLORIDE 40 MEQ: 1500 TABLET, EXTENDED RELEASE ORAL at 08:18

## 2023-02-15 RX ADMIN — LISINOPRIL 40 MG: 20 TABLET ORAL at 08:17

## 2023-02-15 RX ADMIN — RIVAROXABAN 20 MG: 20 TABLET, FILM COATED ORAL at 17:59

## 2023-02-15 RX ADMIN — MAGNESIUM GLUCONATE 500 MG ORAL TABLET 400 MG: 500 TABLET ORAL at 08:18

## 2023-02-15 RX ADMIN — ACETAMINOPHEN 650 MG: 325 TABLET ORAL at 17:58

## 2023-02-15 RX ADMIN — PANTOPRAZOLE SODIUM 40 MG: 40 TABLET, DELAYED RELEASE ORAL at 06:05

## 2023-02-15 RX ADMIN — LORAZEPAM 1 MG: 1 TABLET ORAL at 20:44

## 2023-02-15 RX ADMIN — Medication 15 G: at 08:27

## 2023-02-15 RX ADMIN — MAGNESIUM SULFATE HEPTAHYDRATE 2000 MG: 40 INJECTION, SOLUTION INTRAVENOUS at 09:54

## 2023-02-15 RX ADMIN — SPIRONOLACTONE 25 MG: 25 TABLET ORAL at 08:18

## 2023-02-15 ASSESSMENT — PAIN DESCRIPTION - LOCATION
LOCATION: ARM;SHOULDER
LOCATION: SHOULDER;ARM

## 2023-02-15 ASSESSMENT — PAIN SCALES - GENERAL
PAINLEVEL_OUTOF10: 3
PAINLEVEL_OUTOF10: 0
PAINLEVEL_OUTOF10: 0
PAINLEVEL_OUTOF10: 3

## 2023-02-15 ASSESSMENT — PAIN DESCRIPTION - DESCRIPTORS
DESCRIPTORS: ACHING
DESCRIPTORS: ACHING

## 2023-02-15 ASSESSMENT — PAIN DESCRIPTION - ORIENTATION
ORIENTATION: RIGHT
ORIENTATION: RIGHT

## 2023-02-15 NOTE — PROGRESS NOTES
Admit Date: 2/9/2023  81 y.o. female with medical history of AFIB, CVA, carotid stenosis, HTN  She presented with aphasia, confusion, SOB with hypoxia    CT scan of head neg for acute changes   Chest xray: lung congestion   Renal requested for hyponatremia     Subjective:          Review of Systems  Cardio-vascular: no chest pain, no SOB  GI: no N/V/D  : no dysuria, no hematuria    Objective:     Patient Vitals for the past 8 hrs:   BP Temp Temp src Pulse Resp SpO2 Weight   02/15/23 0755 (!) 147/85 98.3 °F (36.8 °C) Oral 71 18 100 % --   02/15/23 0545 -- -- -- -- -- -- 145 lb (65.8 kg)   02/15/23 0443 (!) 167/52 97.3 °F (36.3 °C) Oral 66 22 94 % --       02/15 0701 - 02/15 1900  In: 315 [P.O.:315]  Out: 300 [Urine:300]      Physical Exam:   Lungs: clear  CV: RR, no rub   Abdomen: soft, not tender, no rebound.   Ext: no edema          Data Review   Recent Results (from the past 8 hour(s))   CBC with Auto Differential    Collection Time: 02/15/23  5:19 AM   Result Value Ref Range    WBC 7.6 4.3 - 11.1 K/uL    RBC 3.86 (L) 4.05 - 5.2 M/uL    Hemoglobin 12.2 11.7 - 15.4 g/dL    Hematocrit 35.7 (L) 35.8 - 46.3 %    MCV 92.5 82 - 102 FL    MCH 31.6 26.1 - 32.9 PG    MCHC 34.2 31.4 - 35.0 g/dL    RDW 14.2 11.9 - 14.6 %    Platelets 741 207 - 317 K/uL    MPV 9.3 (L) 9.4 - 12.3 FL    nRBC 0.00 0.0 - 0.2 K/uL    Differential Type AUTOMATED      Seg Neutrophils 54 43 - 78 %    Lymphocytes 32 13 - 44 %    Monocytes 9 4.0 - 12.0 %    Eosinophils % 5 0.5 - 7.8 %    Basophils 0 0.0 - 2.0 %    Immature Granulocytes 0 0.0 - 5.0 %    Segs Absolute 4.1 1.7 - 8.2 K/UL    Absolute Lymph # 2.4 0.5 - 4.6 K/UL    Absolute Mono # 0.7 0.1 - 1.3 K/UL    Absolute Eos # 0.4 0.0 - 0.8 K/UL    Basophils Absolute 0.0 0.0 - 0.2 K/UL    Absolute Immature Granulocyte 0.0 0.0 - 0.5 K/UL   Basic Metabolic Panel w/ Reflex to MG    Collection Time: 02/15/23  5:19 AM   Result Value Ref Range    Sodium 130 (L) 133 - 143 mmol/L    Potassium 3.1 (L) 3.5 - 5.1 mmol/L    Chloride 94 (L) 101 - 110 mmol/L    CO2 27 21 - 32 mmol/L    Anion Gap 9 2 - 11 mmol/L    Glucose 97 65 - 100 mg/dL    BUN 9 8 - 23 MG/DL    Creatinine 0.60 0.6 - 1.0 MG/DL    Est, Glom Filt Rate >60 >60 ml/min/1.73m2    Calcium 8.5 8.3 - 10.4 MG/DL   Phosphorus    Collection Time: 02/15/23  5:19 AM   Result Value Ref Range    Phosphorus 2.9 2.3 - 3.7 MG/DL   Magnesium    Collection Time: 02/15/23  5:19 AM   Result Value Ref Range    Magnesium 1.6 (L) 1.8 - 2.4 mg/dL           Assessment:     Principal Problem:    Stroke-like symptoms  Active Problems:    Hyponatremia    Hypokalemia    Acute respiratory failure with hypoxia (HCC)    Acute metabolic encephalopathy    Cerebrovascular accident ([facility]A) ([facility]    Primary insomnia    Long term current use of anticoagulant    Generalized anxiety disorder    Gastroesophageal reflux disease without esophagitis    Pulmonary hypertension (HCC)    SSS (sick sinus syndrome) (HCC)    Permanent atrial fibrillation (HCC)    Mitral valve regurgitation    History of CVA (cerebrovascular accident)    Bilateral carotid artery disease (HCC)    Pure hypercholesterolemia    Essential tremor  Resolved Problems:    * No resolved hospital problems. *      Plan:     -Chronic hypo osmolar hyponatremia  Volume expanded. Hyponatremia could be exacerbated with Aldactone and Celexa, hypokalemia. On Urea better      - Hypokalemia, hypomagnesemia: supplement, better      -CVA     - A.  Fib     -HTN     Ricki Olson MD

## 2023-02-15 NOTE — PROGRESS NOTES
Spiritual Care Visit, initial visit. Visited with patient at bedside. Prayed for patient's healing and health. Visit by Shawn Ordonez, Staff .  Beverley., Th.B., B.A.

## 2023-02-15 NOTE — PROGRESS NOTES
ACUTE PHYSICAL THERAPY GOALS:   (Developed with and agreed upon by patient and/or caregiver. )  LTG:  (1.)Ms. Gresham will move from supine to sit and sit to supine , scoot up and down, and roll side to side in bed with CONTACT GUARD ASSIST within 7 treatment day(s). (2.)Ms. Gresham will transfer from bed to chair and chair to bed with CONTACT GUARD ASSIST using the least restrictive device within 7 treatment day(s). (3.)Ms. Gresham will ambulate with CONTACT GUARD ASSIST for 50 feet with the least restrictive device within 7 treatment day(s). (4.)Ms. Gresham will tolerate at least 23 min of dynamic standing activity to assist standing ADLs with the least restrictive device within 7 treatment days. PHYSICAL THERAPY: Daily Note PM   (Link to Caseload Tracking: PT Visit Days : 3  Time In/Out PT Charge Capture  Rehab Caseload Tracker  Orders    Shu Gonzalez is a 81 y.o. female   PRIMARY DIAGNOSIS: Stroke-like symptoms  Stroke-like symptoms [R29.90]  Cerebrovascular accident (CVA), unspecified mechanism ([facility] [I63.9]       Inpatient: Payor: MEDICARE / Plan: MEDICARE PART A AND B / Product Type: *No Product type* /     ASSESSMENT:     REHAB RECOMMENDATIONS:   Recommendation to date pending progress:  Setting:  Inpatient Rehab Facility    Equipment:    To Be Determined     ASSESSMENT:  Ms. Zena Magana presents sitting up on side of bed and agreeable to therapy. She stood and ambulated into the bathroom with min hand held assist.  She then ambulated in [place] as below using rolling walker and CGA. She is moving better and increasing her ambulation distance. She is a little unsteady without the walker and reaching for objects to hold onto. She was left up in chair with needs in reach. Will continue with POC.        SUBJECTIVE:   Ms. Zena Magana states, \"Thank you so much\"     Social/Functional Lives With: Spouse  Type of Home: House  Bathroom Equipment: Grab bars in shower  Home Equipment: Eleuterio Orn, [address] [facility] Help From: Family  ADL Assistance: Independent  Homemaking Assistance: Independent  Ambulation Assistance: Independent  Transfer Assistance: Independent  Active : No  Patient's  Info: Spouse drives  Mode of Transportation: Car  OBJECTIVE:     PAIN: VITALS / O2: PRECAUTION / Giovany Lujan / Yessenia Hallmark:   Pre Treatment: 0         Post Treatment: 0 Vitals        Oxygen    IV    RESTRICTIONS/PRECAUTIONS:        MOBILITY: I Mod I S SBA CGA Min Mod Max Total  NT x2 Comments:   Bed Mobility    Rolling [] [] [] [] [] [] [] [] [] [] []    Supine to Sit [] [] [] [] [] [] [] [] [] [] []    Scooting [] [] [] [] [] [x] [] [] [] [] []    Sit to Supine [] [] [] [] [] [] [] [] [] [] []    Transfers    Sit to Stand [] [] [] [] [] [x] [] [] [] [] []    Bed to Chair [] [] [] [] [] [x] [] [] [] [] []    Stand to Sit [] [] [] [] [] [x] [] [] [] [] []     [] [] [] [] [] [] [] [] [] [] []    I=Independent, Mod I=Modified Independent, S=Supervision, SBA=Standby Assistance, CGA=Contact Guard Assistance,   Min=Minimal Assistance, Mod=Moderate Assistance, Max=Maximal Assistance, Total=Total Assistance, NT=Not Tested    BALANCE: Good Fair+ Fair Fair- Poor NT Comments   Sitting Static [x] [] [] [] [] []    Sitting Dynamic [x] [] [] [] [] []              Standing Static [] [x] [] [] [] []    Standing Dynamic [] [x] [] [] [] []      GAIT: I Mod I S SBA CGA Min Mod Max Total  NT x2 Comments:   Level of Assistance [] [] [] [] [x] [] [] [] [] [] [x]    Distance 80' x 2 feet    DME Gait Belt and Rolling Walker    Gait Quality Decreased selma , Decreased step clearance, Decreased step length, and Decreased stance    Weightbearing Status      Stairs      I=Independent, Mod I=Modified Independent, S=Supervision, SBA=Standby Assistance, CGA=Contact Guard Assistance,   Min=Minimal Assistance, Mod=Moderate Assistance, Max=Maximal Assistance, Total=Total Assistance, NT=Not Tested    PLAN:   FREQUENCY AND DURATION: 3 times/week for duration of hospital stay or until stated goals are met, whichever comes first.    TREATMENT:   TREATMENT:   Therapeutic Activity (24 Minutes): Therapeutic activity included Scooting, Transfer Training, Ambulation on level ground, Sitting balance , and Standing balance to improve functional Activity tolerance, Balance, Coordination, Mobility, Strength, and ROM.     TREATMENT GRID:  N/A    AFTER TREATMENT PRECAUTIONS: Alarm Activated, Bed/Chair Locked, Call light within reach, Chair, Needs within reach, RN notified, and Visitors at bedside    INTERDISCIPLINARY COLLABORATION:  RN/ PCT and PT/ PTA    EDUCATION:      TIME IN/OUT:  Time In: 1428  Time Out: 1452  Minutes: 24    ARIK LUND PTA

## 2023-02-15 NOTE — PROGRESS NOTES
Hospitalist Progress Note   Admit Date:  2023  9:26 PM   Name:  Shiraz Taylor   Age:  81 y.o. Sex:  female  :  1940   MRN:  [id number]   Room:  [facility]/01    Presenting Complaint: Cerebrovascular Accident     Reason(s) for Admission: Stroke-like symptoms [R29.90]  Cerebrovascular accident (CVA), unspecified mechanism [facility]) [I63.9]     Hospital Course:   Shiraz Taylor is a 81 y.o. female with medical history of AFIB on xarelto, SSS, prior CVA s/p tpa with hemorrhagic converstion in , BL COSME, GERD who presented from home via EMS with acute onset aphasia that started 2 hours PTA. She was drinking water and it spilled out of her mouth and then family noticied she had difficulty speaking. Noted to have some confusion while en route with EMS and on arrival. CODE stroke called and she was Evaluated by tele-neurology not a candidate for tpa on DOAC     Started having cold like symptoms last month and since then has had poor intact, dry cough and noticied SOB today. While in ED hypoxic with increasing oxygen requirements. Very agitated, restless and anxious. Per daughter at beside takes ativan 2 mg and metalonin at bedtime. Sna 127, K 2.9   CTH and CTA head and neck       1. No acute intracranial abnormality visible by CT. MRI has greater sensitivity    for acute infarct. 2. Extensive chronic small vessel ischemic changes throughout the white matter    and possible chronic lacunar infarct in the right basal ganglia/internal    capsule. 3. No intracranial arterial occlusion or hemodynamically significant stenosis. 4. 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 5. Severe/critical atherosclerotic stenosis of the bilateral internal carotid    arteries in the neck, measuring 90% on the right and 70% on the left by NASCET    criteria. 6. Vertebral arteries are widely patent bilaterally.    7. Moderate atherosclerotic stenosis at the origins of both subclavian arteries. 8. Trace interstitial edema in the lung apices. 9. Mild inflammation in the paranasal sinuses. Rec'd labetalol 10 mg IV in ED   TTE with bubble with EF 55% to 60%      Subjective & 24hr Events (02/15/23): Patient was seen and examined at the bedside. Daughter at the bedside. She is off oxygen and is on RA. She was laying down on the bed. Pt denies chest pain,s ob, n,v,d.  Assessment & Plan:   Newly diagnosed- Right Ischemic CVA    Home antiplatelets/anticoagulation: xarelto 20 and ASA 81  Continue Atorvastatin 80 mg  Discontinued plavix    MRI with sedation on 2/14 showed infarct in right frontal lobe  patient is at high risk for neurological complications in the setting of carotid artery surgical intervention  vascular surgery following       Hypokalemia/hypomagnesemia   K is 3.1, Mg 1.6  S/p K 40 meq x 2 and Mg 2 gr iv  Follow-up with repeat K and magnesium in p.m. Newly diagnosed Parkinsonism with  cognitive impairment. Clinically diagnosed with PD. Degree of bradykinesia, constipation, masking of the face and right arm tremor and recurrent falls  Continue  sinemet  Continued ST to address cognitive-linguistic treatment  Neurology following      Acute hypoxic respiratory failiure -   Resolved and is on RA   Leucocytosis resolved    Chronic hypo osmolar hyponatremia -   Aldactone and Celexa contributed to worsening hyponatremia  Currently Sodium is 128 and serum osm 265. Holding  Aldactone   Continue Urea   Nephrology is following         PAFIB // SSS - saw cardiology on 12/14 and referred to EP for PPM consideration and saw on 12/27 with plans to monitor symptoms and re-assess in 1 year. Cont. Shaila       MR/pHTN - followed by [facility] cardiology      VIRY - cont citalopram. cont alternative agent. PT/OT recommended IRF but pt wants to go home. Diet:  ADULT DIET;  Regular  ADULT ORAL NUTRITION SUPPLEMENT; Breakfast, Lunch, Dinner; Standard High Calorie/High Protein Oral Supplement  DVT PPx: Lovenox   Code status: Full Code    Hospital Problems:  Principal Problem:    Stroke-like symptoms  Active Problems:    Hyponatremia    Hypokalemia    Acute respiratory failure with hypoxia (HCC)    Acute metabolic encephalopathy    Cerebrovascular accident (CVA) ([facility] [address]    Primary insomnia    Long term current use of anticoagulant    Generalized anxiety disorder    Gastroesophageal reflux disease without esophagitis    Pulmonary hypertension (HCC)    SSS (sick sinus syndrome) (HCC)    Permanent atrial fibrillation (HCC)    Mitral valve regurgitation    History of CVA (cerebrovascular accident)    Bilateral carotid artery disease ([facility] [address]    Pure hypercholesterolemia    Essential tremor  Resolved Problems:    * No resolved hospital problems. *      Objective:   Patient Vitals for the past 24 hrs:   Temp Pulse Resp BP SpO2   02/15/23 1210 97.5 °F (36.4 °C) 67 18 (!) 151/64 97 %   02/15/23 0755 98.3 °F (36.8 °C) 71 18 (!) 147/85 100 %   02/15/23 0443 97.3 °F (36.3 °C) 66 22 (!) 167/52 94 %   02/14/23 2333 98.6 °F (37 °C) 64 21 134/70 95 %   02/14/23 2140 97.7 °F (36.5 °C) 79 22 (!) 148/67 98 %   02/14/23 1640 98 °F (36.7 °C) 88 17 136/76 95 %       Oxygen Therapy  SpO2: 97 %  Pulse Oximeter Device Mode: Intermittent  O2 Device: None (Room air)  O2 Flow Rate (L/min): 2 L/min    Estimated body mass index is 27.4 kg/m² as calculated from the following:    Height as of this encounter: 5' 1\" (1.549 m). Weight as of this encounter: 145 lb (65.8 kg). Intake/Output Summary (Last 24 hours) at 2/15/2023 1504  Last data filed at 2/15/2023 1200  Gross per 24 hour   Intake 685 ml   Output 1050 ml   Net -365 ml           Physical Exam:     Blood pressure (!) 151/64, pulse 67, temperature 97.5 °F (36.4 °C), temperature source Axillary, resp. rate 18, height 5' 1\" (1.549 m), weight 145 lb (65.8 kg), SpO2 97 %. General:    Well nourished.     Head:  Normocephalic, atraumatic  Eyes:  Sclerae appear normal.  Pupils equally round. ENT:  Nares appear normal.  Moist oral mucosa  Neck:  No restricted ROM. Trachea midline   CV:   RRR. No m/r/g. No jugular venous distension. Lungs:   CTAB. No wheezing, rhonchi, or rales. Symmetric expansion. Abdomen:   Soft, nontender, nondistended. Extremities: No cyanosis or clubbing. No edema  Skin:     No rashes and normal coloration. Warm and dry. Neuro:  CN II-XII grossly intact. Psych:  Normal mood and affect.       I have personally reviewed labs and tests:  Recent Labs:  Recent Results (from the past 48 hour(s))   Osmolality    Collection Time: 02/13/23  6:29 PM   Result Value Ref Range    Serum Osmolality 265 (L) 280 - 301 MOSM/kg H2O   CBC with Auto Differential    Collection Time: 02/14/23  5:30 AM   Result Value Ref Range    WBC 6.9 4.3 - 11.1 K/uL    RBC 3.98 (L) 4.05 - 5.2 M/uL    Hemoglobin 12.4 11.7 - 15.4 g/dL    Hematocrit 36.4 35.8 - 46.3 %    MCV 91.5 82 - 102 FL    MCH 31.2 26.1 - 32.9 PG    MCHC 34.1 31.4 - 35.0 g/dL    RDW 14.1 11.9 - 14.6 %    Platelets 441 005 - 720 K/uL    MPV 9.3 (L) 9.4 - 12.3 FL    nRBC 0.00 0.0 - 0.2 K/uL    Differential Type AUTOMATED      Seg Neutrophils 61 43 - 78 %    Lymphocytes 29 13 - 44 %    Monocytes 9 4.0 - 12.0 %    Eosinophils % 1 0.5 - 7.8 %    Basophils 0 0.0 - 2.0 %    Immature Granulocytes 0 0.0 - 5.0 %    Segs Absolute 4.1 1.7 - 8.2 K/UL    Absolute Lymph # 2.0 0.5 - 4.6 K/UL    Absolute Mono # 0.7 0.1 - 1.3 K/UL    Absolute Eos # 0.1 0.0 - 0.8 K/UL    Basophils Absolute 0.0 0.0 - 0.2 K/UL    Absolute Immature Granulocyte 0.0 0.0 - 0.5 K/UL   Basic Metabolic Panel w/ Reflex to MG    Collection Time: 02/14/23  5:30 AM   Result Value Ref Range    Sodium 127 (L) 133 - 143 mmol/L    Potassium 3.5 3.5 - 5.1 mmol/L    Chloride 92 (L) 101 - 110 mmol/L    CO2 28 21 - 32 mmol/L    Anion Gap 7 2 - 11 mmol/L    Glucose 97 65 - 100 mg/dL    BUN 15 8 - 23 MG/DL    Creatinine 0.70 0.6 - 1.0 MG/DL    Est, Glom Filt Rate >60 >60 ml/min/1.73m2    Calcium 8.7 8.3 - 10.4 MG/DL   Phosphorus    Collection Time: 02/14/23  5:30 AM   Result Value Ref Range    Phosphorus 3.1 2.3 - 3.7 MG/DL   Magnesium    Collection Time: 02/14/23  5:30 AM   Result Value Ref Range    Magnesium 1.9 1.8 - 2.4 mg/dL   CBC with Auto Differential    Collection Time: 02/15/23  5:19 AM   Result Value Ref Range    WBC 7.6 4.3 - 11.1 K/uL    RBC 3.86 (L) 4.05 - 5.2 M/uL    Hemoglobin 12.2 11.7 - 15.4 g/dL    Hematocrit 35.7 (L) 35.8 - 46.3 %    MCV 92.5 82 - 102 FL    MCH 31.6 26.1 - 32.9 PG    MCHC 34.2 31.4 - 35.0 g/dL    RDW 14.2 11.9 - 14.6 %    Platelets 088 320 - 016 K/uL    MPV 9.3 (L) 9.4 - 12.3 FL    nRBC 0.00 0.0 - 0.2 K/uL    Differential Type AUTOMATED      Seg Neutrophils 54 43 - 78 %    Lymphocytes 32 13 - 44 %    Monocytes 9 4.0 - 12.0 %    Eosinophils % 5 0.5 - 7.8 %    Basophils 0 0.0 - 2.0 %    Immature Granulocytes 0 0.0 - 5.0 %    Segs Absolute 4.1 1.7 - 8.2 K/UL    Absolute Lymph # 2.4 0.5 - 4.6 K/UL    Absolute Mono # 0.7 0.1 - 1.3 K/UL    Absolute Eos # 0.4 0.0 - 0.8 K/UL    Basophils Absolute 0.0 0.0 - 0.2 K/UL    Absolute Immature Granulocyte 0.0 0.0 - 0.5 K/UL   Basic Metabolic Panel w/ Reflex to MG    Collection Time: 02/15/23  5:19 AM   Result Value Ref Range    Sodium 130 (L) 133 - 143 mmol/L    Potassium 3.1 (L) 3.5 - 5.1 mmol/L    Chloride 94 (L) 101 - 110 mmol/L    CO2 27 21 - 32 mmol/L    Anion Gap 9 2 - 11 mmol/L    Glucose 97 65 - 100 mg/dL    BUN 9 8 - 23 MG/DL    Creatinine 0.60 0.6 - 1.0 MG/DL    Est, Glom Filt Rate >60 >60 ml/min/1.73m2    Calcium 8.5 8.3 - 10.4 MG/DL   Phosphorus    Collection Time: 02/15/23  5:19 AM   Result Value Ref Range    Phosphorus 2.9 2.3 - 3.7 MG/DL   Magnesium    Collection Time: 02/15/23  5:19 AM   Result Value Ref Range    Magnesium 1.6 (L) 1.8 - 2.4 mg/dL   Basic Metabolic Panel w/ Reflex to MG    Collection Time: 02/15/23  1:36 PM   Result Value Ref Range Sodium 128 (L) 133 - 143 mmol/L    Potassium 3.3 (L) 3.5 - 5.1 mmol/L    Chloride 93 (L) 101 - 110 mmol/L    CO2 27 21 - 32 mmol/L    Anion Gap 8 2 - 11 mmol/L    Glucose 94 65 - 100 mg/dL    BUN 12 8 - 23 MG/DL    Creatinine 0.90 0.6 - 1.0 MG/DL    Est, Glom Filt Rate >60 >60 ml/min/1.73m2    Calcium 8.8 8.3 - 10.4 MG/DL   Magnesium    Collection Time: 02/15/23  1:36 PM   Result Value Ref Range    Magnesium 2.6 (H) 1.8 - 2.4 mg/dL       I have personally reviewed imaging studies:  Other Studies:  MRI brain without contrast   Final Result      Small foci of acute/subacute infarct in the right anterior paramedian frontal   lobe. Moderate chronic white matter microvascular ischemic changes. XR CHEST PORTABLE   Final Result      Mild pulmonary edema with probable small bilateral pleural effusions. XR CHEST PORTABLE   Final Result      1. Cardiomegaly with mild pulmonary edema. Isabel Cordova M.D.    2/9/2023 10:56:00 PM      CT HEAD WO CONTRAST   Final Result      1. No acute intracranial abnormality visible by CT. MRI has greater sensitivity    for acute infarct. 2. Extensive chronic small vessel ischemic changes throughout the white matter    and possible chronic lacunar infarct in the right basal ganglia/internal    capsule. 3. No intracranial arterial occlusion or hemodynamically significant stenosis. 4. 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 5. Severe/critical atherosclerotic stenosis of the bilateral internal carotid    arteries in the neck, measuring 90% on the right and 70% on the left by NASCET    criteria. 6. Vertebral arteries are widely patent bilaterally. 7. Moderate atherosclerotic stenosis at the origins of both subclavian arteries. 8. Trace interstitial edema in the lung apices. 9. Mild inflammation in the paranasal sinuses.       Discussed with Dr. Noemy Fernando at 16 p.m. on 2/9/2023            This examination was interpreted by MERI Zuluaga M.D.    2/9/2023 10:20:00 PM      CTA HEAD NECK W CONTRAST   Final Result      1. No acute intracranial abnormality visible by CT. MRI has greater sensitivity    for acute infarct. 2. Extensive chronic small vessel ischemic changes throughout the white matter    and possible chronic lacunar infarct in the right basal ganglia/internal    capsule. 3. No intracranial arterial occlusion or hemodynamically significant stenosis. 4. 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 5. Severe/critical atherosclerotic stenosis of the bilateral internal carotid    arteries in the neck, measuring 90% on the right and 70% on the left by NASCET    criteria. 6. Vertebral arteries are widely patent bilaterally. 7. Moderate atherosclerotic stenosis at the origins of both subclavian arteries. 8. Trace interstitial edema in the lung apices. 9. Mild inflammation in the paranasal sinuses. Discussed with Dr. Saint Clair at 16 p.m. on 2/9/2023            This examination was interpreted by MERI Zuluaga M.D.    2/9/2023 10:20:00 PM          Current Meds:  Current Facility-Administered Medications   Medication Dose Route Frequency    potassium chloride (KLOR-CON M) extended release tablet 40 mEq  40 mEq Oral BID WC    potassium chloride (KLOR-CON M) extended release tablet 40 mEq  40 mEq Oral Once    magnesium oxide (MAG-OX) tablet 400 mg  400 mg Oral Daily    amLODIPine (NORVASC) tablet 10 mg  10 mg Oral Daily    urea (URE-NA) packet 15 g  15 g Oral Daily    LORazepam (ATIVAN) tablet 1 mg  1 mg Oral Nightly PRN    lisinopril (PRINIVIL;ZESTRIL) tablet 40 mg  40 mg Oral Daily    carbidopa-levodopa (SINEMET)  MG per tablet 0.5 tablet  0.5 tablet Oral TID    [Held by provider] furosemide (LASIX) injection 40 mg  40 mg IntraVENous Daily    aspirin EC tablet 81 mg  81 mg Oral Daily    atorvastatin (LIPITOR) tablet 80 mg  80 mg Oral Nightly    citalopram (CELEXA) tablet 20 mg  20 mg Oral Daily    pantoprazole (PROTONIX) tablet 40 mg  40 mg Oral QAM AC    rivaroxaban (XARELTO) tablet 20 mg  20 mg Oral Dinner    spironolactone (ALDACTONE) tablet 25 mg  25 mg Oral Daily    ondansetron (ZOFRAN-ODT) disintegrating tablet 4 mg  4 mg Oral Q8H PRN    Or    ondansetron (ZOFRAN) injection 4 mg  4 mg IntraVENous Q6H PRN    polyethylene glycol (GLYCOLAX) packet 17 g  17 g Oral Daily PRN    acetaminophen (TYLENOL) tablet 650 mg  650 mg Oral Q4H PRN    Or    acetaminophen (TYLENOL) suppository 650 mg  650 mg Rectal Q4H PRN    labetalol (NORMODYNE;TRANDATE) injection 10 mg  10 mg IntraVENous Q10 Min PRN    magnesium sulfate 2000 mg in 50 mL IVPB premix  2,000 mg IntraVENous PRN    potassium chloride (KLOR-CON M) extended release tablet 40 mEq  40 mEq Oral PRN    Or    potassium bicarb-citric acid (EFFER-K) effervescent tablet 40 mEq  40 mEq Oral PRN    Or    potassium chloride 10 mEq/100 mL IVPB (Peripheral Line)  10 mEq IntraVENous PRN    0.9 % sodium chloride bolus  100 mL IntraVENous ONCE PRN       Signed:  Patrick Almazan MD    Part of this note may have been written by using a voice dictation software. The note has been proof read but may still contain some grammatical/other typographical errors.

## 2023-02-16 VITALS
HEART RATE: 72 BPM | HEIGHT: 61 IN | WEIGHT: 145 LBS | DIASTOLIC BLOOD PRESSURE: 64 MMHG | SYSTOLIC BLOOD PRESSURE: 141 MMHG | RESPIRATION RATE: 18 BRPM | BODY MASS INDEX: 27.38 KG/M2 | TEMPERATURE: 97.3 F | OXYGEN SATURATION: 97 %

## 2023-02-16 LAB
ANION GAP SERPL CALC-SCNC: 5 MMOL/L (ref 2–11)
BASOPHILS # BLD: 0 K/UL (ref 0–0.2)
BASOPHILS NFR BLD: 0 % (ref 0–2)
BUN SERPL-MCNC: 11 MG/DL (ref 8–23)
CALCIUM SERPL-MCNC: 9.2 MG/DL (ref 8.3–10.4)
CHLORIDE SERPL-SCNC: 98 MMOL/L (ref 101–110)
CO2 SERPL-SCNC: 24 MMOL/L (ref 21–32)
CREAT SERPL-MCNC: 0.6 MG/DL (ref 0.6–1)
DIFFERENTIAL METHOD BLD: ABNORMAL
EOSINOPHIL # BLD: 0.4 K/UL (ref 0–0.8)
EOSINOPHIL NFR BLD: 4 % (ref 0.5–7.8)
ERYTHROCYTE [DISTWIDTH] IN BLOOD BY AUTOMATED COUNT: 14.4 % (ref 11.9–14.6)
GLUCOSE SERPL-MCNC: 94 MG/DL (ref 65–100)
HCT VFR BLD AUTO: 37.5 % (ref 35.8–46.3)
HGB BLD-MCNC: 12.7 G/DL (ref 11.7–15.4)
IMM GRANULOCYTES # BLD AUTO: 0 K/UL (ref 0–0.5)
IMM GRANULOCYTES NFR BLD AUTO: 0 % (ref 0–5)
LYMPHOCYTES # BLD: 3.4 K/UL (ref 0.5–4.6)
LYMPHOCYTES NFR BLD: 32 % (ref 13–44)
MCH RBC QN AUTO: 31.1 PG (ref 26.1–32.9)
MCHC RBC AUTO-ENTMCNC: 33.9 G/DL (ref 31.4–35)
MCV RBC AUTO: 91.9 FL (ref 82–102)
MONOCYTES # BLD: 0.8 K/UL (ref 0.1–1.3)
MONOCYTES NFR BLD: 8 % (ref 4–12)
NEUTS SEG # BLD: 5.9 K/UL (ref 1.7–8.2)
NEUTS SEG NFR BLD: 56 % (ref 43–78)
NRBC # BLD: 0 K/UL (ref 0–0.2)
PHOSPHATE SERPL-MCNC: 2.1 MG/DL (ref 2.3–3.7)
PLATELET # BLD AUTO: 287 K/UL (ref 150–450)
PMV BLD AUTO: 9.3 FL (ref 9.4–12.3)
POTASSIUM SERPL-SCNC: 4.5 MMOL/L (ref 3.5–5.1)
RBC # BLD AUTO: 4.08 M/UL (ref 4.05–5.2)
SODIUM SERPL-SCNC: 127 MMOL/L (ref 133–143)
WBC # BLD AUTO: 10.5 K/UL (ref 4.3–11.1)

## 2023-02-16 PROCEDURE — 85025 COMPLETE CBC W/AUTO DIFF WBC: CPT

## 2023-02-16 PROCEDURE — 6370000000 HC RX 637 (ALT 250 FOR IP): Performed by: INTERNAL MEDICINE

## 2023-02-16 PROCEDURE — 84100 ASSAY OF PHOSPHORUS: CPT

## 2023-02-16 PROCEDURE — 2580000003 HC RX 258: Performed by: STUDENT IN AN ORGANIZED HEALTH CARE EDUCATION/TRAINING PROGRAM

## 2023-02-16 PROCEDURE — 6370000000 HC RX 637 (ALT 250 FOR IP): Performed by: PSYCHIATRY & NEUROLOGY

## 2023-02-16 PROCEDURE — 97530 THERAPEUTIC ACTIVITIES: CPT

## 2023-02-16 PROCEDURE — 80048 BASIC METABOLIC PNL TOTAL CA: CPT

## 2023-02-16 PROCEDURE — 6370000000 HC RX 637 (ALT 250 FOR IP): Performed by: STUDENT IN AN ORGANIZED HEALTH CARE EDUCATION/TRAINING PROGRAM

## 2023-02-16 PROCEDURE — 6370000000 HC RX 637 (ALT 250 FOR IP): Performed by: HOSPITALIST

## 2023-02-16 PROCEDURE — 97535 SELF CARE MNGMENT TRAINING: CPT

## 2023-02-16 PROCEDURE — 36415 COLL VENOUS BLD VENIPUNCTURE: CPT

## 2023-02-16 PROCEDURE — 6370000000 HC RX 637 (ALT 250 FOR IP): Performed by: FAMILY MEDICINE

## 2023-02-16 PROCEDURE — 2500000003 HC RX 250 WO HCPCS: Performed by: STUDENT IN AN ORGANIZED HEALTH CARE EDUCATION/TRAINING PROGRAM

## 2023-02-16 RX ORDER — HYDRALAZINE HYDROCHLORIDE 50 MG/1
50 TABLET, FILM COATED ORAL EVERY 8 HOURS SCHEDULED
Status: DISCONTINUED | OUTPATIENT
Start: 2023-02-16 | End: 2023-02-16

## 2023-02-16 RX ORDER — HYDRALAZINE HYDROCHLORIDE 25 MG/1
25 TABLET, FILM COATED ORAL EVERY 8 HOURS SCHEDULED
Status: DISCONTINUED | OUTPATIENT
Start: 2023-02-16 | End: 2023-02-16 | Stop reason: HOSPADM

## 2023-02-16 RX ORDER — HYDRALAZINE HYDROCHLORIDE 25 MG/1
25 TABLET, FILM COATED ORAL EVERY 8 HOURS SCHEDULED
Status: DISCONTINUED | OUTPATIENT
Start: 2023-02-16 | End: 2023-02-16

## 2023-02-16 RX ORDER — ATORVASTATIN CALCIUM 40 MG/1
80 TABLET, FILM COATED ORAL DAILY
Qty: 30 TABLET | Refills: 3 | Status: SHIPPED | OUTPATIENT
Start: 2023-02-16

## 2023-02-16 RX ORDER — LORAZEPAM 0.5 MG/1
0.5 TABLET ORAL ONCE
Status: COMPLETED | OUTPATIENT
Start: 2023-02-16 | End: 2023-02-16

## 2023-02-16 RX ADMIN — MAGNESIUM GLUCONATE 500 MG ORAL TABLET 400 MG: 500 TABLET ORAL at 07:53

## 2023-02-16 RX ADMIN — HYDRALAZINE HYDROCHLORIDE 25 MG: 25 TABLET, FILM COATED ORAL at 14:09

## 2023-02-16 RX ADMIN — PANTOPRAZOLE SODIUM 40 MG: 40 TABLET, DELAYED RELEASE ORAL at 07:54

## 2023-02-16 RX ADMIN — LORAZEPAM 0.5 MG: 0.5 TABLET ORAL at 01:25

## 2023-02-16 RX ADMIN — ACETAMINOPHEN 650 MG: 325 TABLET ORAL at 07:54

## 2023-02-16 RX ADMIN — Medication 15 G: at 08:09

## 2023-02-16 RX ADMIN — ASPIRIN 81 MG: 81 TABLET, COATED ORAL at 07:55

## 2023-02-16 RX ADMIN — CITALOPRAM HYDROBROMIDE 20 MG: 20 TABLET ORAL at 07:55

## 2023-02-16 RX ADMIN — LISINOPRIL 40 MG: 20 TABLET ORAL at 07:54

## 2023-02-16 RX ADMIN — ACETAMINOPHEN 650 MG: 325 TABLET ORAL at 15:08

## 2023-02-16 RX ADMIN — SODIUM PHOSPHATE, MONOBASIC, MONOHYDRATE AND SODIUM PHOSPHATE, DIBASIC, ANHYDROUS 10 MMOL: 142; 276 INJECTION, SOLUTION INTRAVENOUS at 09:40

## 2023-02-16 RX ADMIN — SPIRONOLACTONE 25 MG: 25 TABLET ORAL at 07:54

## 2023-02-16 RX ADMIN — CARBIDOPA AND LEVODOPA 0.5 TABLET: 25; 100 TABLET ORAL at 07:53

## 2023-02-16 RX ADMIN — AMLODIPINE BESYLATE 10 MG: 10 TABLET ORAL at 07:54

## 2023-02-16 RX ADMIN — CARBIDOPA AND LEVODOPA 0.5 TABLET: 25; 100 TABLET ORAL at 14:09

## 2023-02-16 ASSESSMENT — PAIN DESCRIPTION - ORIENTATION
ORIENTATION: RIGHT
ORIENTATION: RIGHT

## 2023-02-16 ASSESSMENT — PAIN DESCRIPTION - LOCATION
LOCATION: ARM;SHOULDER
LOCATION: ARM;SHOULDER

## 2023-02-16 ASSESSMENT — PAIN DESCRIPTION - DESCRIPTORS
DESCRIPTORS: ACHING
DESCRIPTORS: ACHING

## 2023-02-16 ASSESSMENT — PAIN - FUNCTIONAL ASSESSMENT: PAIN_FUNCTIONAL_ASSESSMENT: PREVENTS OR INTERFERES SOME ACTIVE ACTIVITIES AND ADLS

## 2023-02-16 ASSESSMENT — PAIN SCALES - GENERAL
PAINLEVEL_OUTOF10: 0
PAINLEVEL_OUTOF10: 3
PAINLEVEL_OUTOF10: 0
PAINLEVEL_OUTOF10: 0
PAINLEVEL_OUTOF10: 3
PAINLEVEL_OUTOF10: 0

## 2023-02-16 NOTE — PROGRESS NOTES
ACUTE OCCUPATIONAL THERAPY GOALS:   (Developed with and agreed upon by patient and/or caregiver.)  1. Patient will complete lower body bathing and dressing with SBA and adaptive equipment as   needed. 2. Patient will completed upper body bathing and dressing with Mod I and adaptive equipment as needed. 3. Patient will complete grooming standing at sink with SBA and adaptive equipment as needed. 4. Patient will complete toileting with CGA and adaptive equipment as needed. 5. Patient will tolerate 30 minutes of OT treatment with 1-2 rest breaks to increase activity tolerance for ADLs. 6. Patient will complete functional transfers with CGA and adaptive equipment as needed. Timeframe: 7 visits     OCCUPATIONAL THERAPY: Daily Note PM   OT Visit Days: 3   Time In/Out  OT Charge Capture  Rehab Caseload Tracker  OT Orders    Kyung Gonzalez is a 81 y.o. female   PRIMARY DIAGNOSIS: Stroke-like symptoms  Stroke-like symptoms [R29.90]  Cerebrovascular accident (CVA), unspecified mechanism ([facility] [address] [I63.9]       Inpatient: Payor: MEDICARE / Plan: MEDICARE PART A AND B / Product Type: *No Product type* /     ASSESSMENT:     REHAB RECOMMENDATIONS: CURRENT LEVEL OF FUNCTION:  (Most Recently Demonstrated)   Recommendation to date pending progress:  Setting:  Inpatient Rehab Facility    Equipment:    3 in 1 Bedside Commode Bathing:  Not Tested  Dressing:  Contact Guard Assist  Feeding/Grooming:  Contact Guard Assist  Toileting:  Not Tested  Functional Mobility:  Contact Guard Assist-minimal assistance       ASSESSMENT:  Ms. Jeannie Reyes is progressing well towards OT goals. Today, pt was received sitting EOB. Completed functional transafers, LB dressing, ambulation with HHA, and grooming tasks standing at the sink with overall CGA. Pt with increased safety and better balance using RW but preferred to use HHA in room.  Pt would benefit from continued, intensive, therapy services though family prefers to tke pt home with [facility] therapy services and 24/7 supervision/assistance. Ms. Crys Vora continues to demonstrate overall deficits in strength, balance, activity tolerance, and ADL performance. Continue OT efforts and POC in order to address functional deficits and OT goals stated above. SUBJECTIVE:     Ms. Crys Vora states, \"I dont want to walker in here. \"     Social/Functional Lives With: Spouse  Type of Home: House  Bathroom Equipment: Grab bars in shower  Home Equipment: [employer], [address] Help From: Family  ADL Assistance: Independent  Homemaking Assistance: Independent  Ambulation Assistance: Independent  Transfer Assistance: Independent  Active : No  Patient's  Info: Spouse drives  Mode of Transportation: Car    OBJECTIVE:     Ileprince Matthews / Marcus Martins / AIRWAY: NA    RESTRICTIONS/PRECAUTIONS:       PAIN: VITALS / O2:   Pre Treatment:            Post Treatment: No change  Vitals          Oxygen        MOBILITY: I Mod I S SBA CGA Min Mod Max Total  NT x2 Comments:   Bed Mobility    Rolling [] [] [] [] [] [] [] [] [] [x] []    Supine to Sit [] [] [] [] [] [] [] [] [] [x] [] Received sitting EOB   Scooting [] [] [] [] [x] [] [] [] [] [] [x]    Sit to Supine [] [] [] [] [x] [] [] [] [] [] []    Transfers    Sit to Stand [] [] [] [] [x] [] [] [] [] [] [x]    Bed to Chair [] [] [] [] [] [] [] [] [] [x] []    Stand to Sit [] [] [] [] [x] [] [] [] [] [] []    Tub/Shower [] [] [] [] [] [] [] [] [] [] [x]     Toilet [] [] [] [] [] [] [] [] [] [] [x]      [] [] [] [] [] [] [] [] [] [] []    I=Independent, Mod I=Modified Independent, S=Supervision/Setup, SBA=Standby Assistance, CGA=Contact Guard Assistance, Min=Minimal Assistance, Mod=Moderate Assistance, Max=Maximal Assistance, Total=Total Assistance, NT=Not Tested    ACTIVITIES OF DAILY LIVING: I Mod I S SBA CGA Min Mod Max Total NT Comments   BASIC ADLs:              Upper Body   Bathing [] [] [] [] [] [] [] [] [] [x]    Lower Body Bathing [] [] [] [] [] [] [] [] [] [x]   Toileting [] [] [] [] [] [] [] [] [] [x]    Upper Body Dressing [] [] [] [] [] [] [] [] [] [x]    Lower Body Dressing [] [] [] [] [x] [] [] [] [] [] Socks    Feeding [] [] [] [] [] [] [] [] [] [x]    Grooming [] [] [] [] [x] [] [] [] [] [] Brushed teeth, washed face, and combed hair standing at the sink   Personal Device Care [] [] [] [] [] [] [] [] [] [x]    Functional Mobility [] [] [] [] [x] [x] [] [] [] [] Pt with improved balance and safety with use of RW but prefers HHA in room   I=Independent, Mod I=Modified Independent, S=Supervision/Setup, SBA=Standby Assistance, CGA=Contact Guard Assistance, Min=Minimal Assistance, Mod=Moderate Assistance, Max=Maximal Assistance, Total=Total Assistance, NT=Not Tested    BALANCE: Good Fair+ Fair Fair- Poor NT Comments   Sitting Static [x] [] [] [] [] []    Sitting Dynamic [] [x] [] [] [] []              Standing Static [] [x] [] [] [] []    Standing Dynamic [] [x] [] [] [] []        PLAN:     FREQUENCY/DURATION   OT Plan of Care: 3 times/week for duration of hospital stay or until stated goals are met, whichever comes first.    TREATMENT:     TREATMENT:   Therapeutic Activity (10 Minutes): Patient participated in therapeutic activities including bed mobility training, functional transfer training, functional mobility of household distances, bathroom mobility, sitting tolerance activity, and standing tolerance activity with minimal verbal cues in order to increase safety awareness.   Self Care (10 minutes): Patient participated in lower body dressing and grooming ADLs in unsupported sitting and standing with minimal verbal cueing to increase independence, decrease assistance required, increase activity tolerance, and increase safety awareness. Patient also participated in functional mobility, functional transfer, and energy conservation training to increase independence, decrease assistance required, increase activity tolerance, and increase safety awareness.     TREATMENT  GRID:  N/A    AFTER TREATMENT PRECAUTIONS: Alarm Activated, Bed, Call light within reach, Needs within reach, RN notified, and Visitors at bedside    INTERDISCIPLINARY COLLABORATION:  RN/ PCT, PT/ PTA, and OT/ LOPEZ    EDUCATION:       TOTAL TREATMENT DURATION AND TIME:  Time In: 1438  Time Out: [address]  Minutes: [address] [facility], OT

## 2023-02-16 NOTE — DISCHARGE SUMMARY
Hospitalist Discharge Summary   Admit Date:  2023  9:26 PM   DC Note date: 2023  Name:  Meredith Hernandez   Age:  81 y.o. Sex:  female  :  1940   MRN:  [id number]   Room:  [facility]  PCP:  KACY Garcia NP    Presenting Complaint: Cerebrovascular Accident     Initial Admission Diagnosis: Stroke-like symptoms [R29.90]  Cerebrovascular accident (CVA), unspecified mechanism ([address] [I63.9]     Problem List for this Hospitalization (present on admission):    Principal Problem:    Stroke-like symptoms  Active Problems:    Hyponatremia    Hypokalemia    Acute respiratory failure with hypoxia ([address]    Acute metabolic encephalopathy    Cerebrovascular accident (CVA) ([address]    Primary insomnia    Long term current use of anticoagulant    Generalized anxiety disorder    Gastroesophageal reflux disease without esophagitis    Pulmonary hypertension (HCC)    SSS (sick sinus syndrome) (HCC)    Permanent atrial fibrillation (HCC)    Mitral valve regurgitation    History of CVA (cerebrovascular accident)    Bilateral carotid artery disease ([address]    Pure hypercholesterolemia    Essential tremor  Resolved Problems:    * No resolved hospital problems. *      Hospital Course:  Meredith Hernandez is a 81 y.o. female with medical history of AFIB on xarelto, SSS, prior CVA s/p tpa with hemorrhagic converstion in , BL COSME, GERD who presented from home via EMS with acute onset aphasia . She was drinking water and it spilled out of her mouth and then family noticied she had difficulty speaking. Noted to have some confusion while en route with EMS and on arrival. CODE stroke called and she was Evaluated by tele-neurology not a candidate for tpa on DOAC     Started having cold like symptoms last month and since then has had poor intact, dry cough and noticied SOB today. While in ED hypoxic with increasing oxygen requirements. Very agitated, restless and anxious.  Per daughter at beside takes ativan 2 mg and metalonin at bedtime. Sna 127, K 2.9   CTH and CTA head and neck       1. No acute intracranial abnormality visible by CT. MRI has greater sensitivity    for acute infarct. 2. Extensive chronic small vessel ischemic changes throughout the white matter    and possible chronic lacunar infarct in the right basal ganglia/internal    capsule. 3. No intracranial arterial occlusion or hemodynamically significant stenosis. 4. 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 5. Severe/critical atherosclerotic stenosis of the bilateral internal carotid    arteries in the neck, measuring 90% on the right and 70% on the left by NASCET    criteria. 6. Vertebral arteries are widely patent bilaterally. 7. Moderate atherosclerotic stenosis at the origins of both subclavian arteries. 8. Trace interstitial edema in the lung apices. 9. Mild inflammation in the paranasal sinuses. Rec'd labetalol 10 mg IV in ED   TTE with bubble with EF 55% to 60%. MRI with sedation on 2/14 showed infarct in right frontal lobe. Neurology was consulted and she was diagnosed with Parkinsonism. She was treated with sinemet. She is on xarelto 20 and ASA 81 and not started on plavix. Vascular was consulted and no intervention needed. Outpatient follow up with vascular. Continued ST to address cognitive-linguistic treatment. Her sodium was low. Nephrology was consulted and was treated with Urea. Her aldactone and  Celexa discontinued. She clinically improved. PT recommended IRF. Pt wants to go home. Pt is at risk of falls due to parkinsonism and stroke. She will benefit from IRF and discussed with daughter. Pt is hemodynamically stable for discharge. Disposition: home   Diet: ADULT DIET;  Regular  ADULT ORAL NUTRITION SUPPLEMENT; Breakfast, Lunch, Dinner; Standard High Calorie/High Protein Oral Supplement  Code Status: Full Code    Follow Ups:   Follow-up Information     Kam Valadez MD Follow up on 2/23/2023.    Specialty: Vascular Surgery  Why: hospital f/u at 0830  Contact information:  [facility] [address]  [place] [id number] [phone]                       Time spent in patient discharge and coordination 35 minutes.        Follow up labs/diagnostics (ultimately defer to outpatient provider):  Follow up with PCP in 3-5 days   Follow up with nephrology in 7 days   Follow up with Neurology in 1 month  Follow up with vascular in 1 month  Plan was discussed with patient.   All questions answered.  Patient was stable at time of discharge.  Instructions given to call a physician or return if any concerns.       Medication List        START taking these medications      carbidopa-levodopa  MG per tablet  Commonly known as: SINEMET  Take 0.5 tablets by mouth 3 times daily     urea 15 g Pack packet  Commonly known as: URE-NA  Take 15 g by mouth daily  Start taking on: February 17, 2023            CHANGE how you take these medications      atorvastatin 40 MG tablet  Commonly known as: LIPITOR  Take 2 tablets by mouth daily  What changed: how much to take            CONTINUE taking these medications      amLODIPine 5 MG tablet  Commonly known as: NORVASC     ascorbic acid 250 MG tablet  Commonly known as: VITAMIN C     aspirin 81 MG EC tablet     lisinopril 20 MG tablet  Commonly known as: PRINIVIL;ZESTRIL     omeprazole 20 MG delayed release capsule  Commonly known as: PRILOSEC  Take 1 capsule by mouth daily     rivaroxaban 20 MG Tabs tablet  Commonly known as: XARELTO            STOP taking these medications      citalopram 20 MG tablet  Commonly known as: CELEXA     LORazepam 1 MG tablet  Commonly known as: ATIVAN     mirtazapine 7.5 MG tablet  Commonly known as: REMERON     spironolactone 25 MG tablet  Commonly known as: ALDACTONE               Where to Get Your Medications        These medications were sent to [id number] -  [address] [facility] [place], [address] [address], [address]      Phone: [phone]   atorvastatin 40 MG tablet  carbidopa-levodopa  MG per tablet  urea 15 g Pack packet          Procedures done this admission:  * No surgery found *    Consults this admission:  IP CONSULT TO VASCULAR SURGERY  IP CONSULT TO NEUROLOGY  IP CONSULT TO CASE MANAGEMENT  IP CONSULT TO NEPHROLOGY  SPIRITUAL CARE REFERRAL FOR:    Echocardiogram results:  02/09/23    TRANSTHORACIC ECHOCARDIOGRAM (TTE) COMPLETE (CONTRAST/BUBBLE/3D PRN) 02/10/2023  3:50 PM, 02/10/2023 12:00 AM (Final)    Interpretation Summary    Left Ventricle: Normal left ventricular systolic function with a visually estimated EF of 60 - 65%. Left ventricle size is normal. Normal wall thickness. Normal wall motion. Normal diastolic function. Aortic Valve: Mild sclerosis of the aortic valve cusp. Mitral Valve: Mildly thickened leaflet, at the anterior leaflet. Moderate regurgitation. Tricuspid Valve: Mild to moderate regurgitation. Moderately elevated RVSP. The estimated RVSP is 48 mmHg. Left Atrium: Left atrium is mildly dilated. LA Vol Index is  35 ml/m2. Interatrial Septum: Agitated saline study was negative with and without provocation. Right Atrium: Right atrium is mildly dilated. Technical qualifiers: Color flow Doppler was performed and pulse wave and/or continuous wave Doppler was performed. Signed by: Nemo Sexton MD on 2/10/2023  3:50 PM, Signed by: Unknown Provider Result on 2/10/2023 12:00 AM      Diagnostic Imaging/Tests:   CT HEAD WO CONTRAST    Result Date: 2/9/2023  1. No acute intracranial abnormality visible by CT. MRI has greater sensitivity for acute infarct. 2. Extensive chronic small vessel ischemic changes throughout the white matter and possible chronic lacunar infarct in the right basal ganglia/internal capsule.  3. No intracranial arterial occlusion or hemodynamically significant stenosis. 4. 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 5. Severe/critical atherosclerotic stenosis of the bilateral internal carotid arteries in the neck, measuring 90% on the right and 70% on the left by NASCET criteria. 6. Vertebral arteries are widely patent bilaterally. 7. Moderate atherosclerotic stenosis at the origins of both subclavian arteries. 8. Trace interstitial edema in the lung apices. 9. Mild inflammation in the paranasal sinuses. Discussed with Dr. Leeann Deutsch at 16 p.m. on 2/9/2023 This examination was interpreted by MERI Cesar M.D. 2/9/2023 10:20:00 PM    XR CHEST PORTABLE    Result Date: 2/11/2023  Mild pulmonary edema with probable small bilateral pleural effusions. XR CHEST PORTABLE    Result Date: 2/9/2023  1. Cardiomegaly with mild pulmonary edema. Madeline Copeland M.D. 2/9/2023 10:56:00 PM    CTA HEAD NECK W CONTRAST    Result Date: 2/9/2023  1. No acute intracranial abnormality visible by CT. MRI has greater sensitivity for acute infarct. 2. Extensive chronic small vessel ischemic changes throughout the white matter and possible chronic lacunar infarct in the right basal ganglia/internal capsule. 3. No intracranial arterial occlusion or hemodynamically significant stenosis. 4. 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 5. Severe/critical atherosclerotic stenosis of the bilateral internal carotid arteries in the neck, measuring 90% on the right and 70% on the left by NASCET criteria. 6. Vertebral arteries are widely patent bilaterally. 7. Moderate atherosclerotic stenosis at the origins of both subclavian arteries. 8. Trace interstitial edema in the lung apices. 9. Mild inflammation in the paranasal sinuses.  Discussed with Dr. Leeann Deutsch at 16 p.m. on 2/9/2023 This examination was interpreted by Jose Arnold M.D. Jose Arnold M.D. 2/9/2023 10:20:00 PM    MRI brain without contrast    Result Date: 2/14/2023  Small foci of acute/subacute infarct in the right anterior paramedian frontal lobe. Moderate chronic white matter microvascular ischemic changes. Labs: Results:       BMP, Mg, Phos Recent Labs     02/14/23  0530 02/15/23  0519 02/15/23  1336 02/16/23  0547   * 130* 128* 127*   K 3.5 3.1* 3.3* 4.5   CL 92* 94* 93* 98*   CO2 28 27 27 24   ANIONGAP 7 9 8 5   BUN 15 9 12 11   CREATININE 0.70 0.60 0.90 0.60   LABGLOM >60 >60 >60 >60   CALCIUM 8.7 8.5 8.8 9.2   GLUCOSE 97 97 94 94   MG 1.9 1.6* 2.6*  --    PHOS 3.1 2.9  --  2.1*      CBC Recent Labs     02/14/23  0530 02/15/23  0519 02/16/23  0547   WBC 6.9 7.6 10.5   RBC 3.98* 3.86* 4.08   HGB 12.4 12.2 12.7   HCT 36.4 35.7* 37.5   MCV 91.5 92.5 91.9   MCH 31.2 31.6 31.1   MCHC 34.1 34.2 33.9   RDW 14.1 14.2 14.4    263 287   MPV 9.3* 9.3* 9.3*   NRBC 0.00 0.00 0.00   SEGS 61 54 56   LYMPHOPCT 29 32 32   EOSRELPCT 1 5 4   MONOPCT 9 9 8   BASOPCT 0 0 0   IMMGRAN 0 0 0   SEGSABS 4.1 4.1 5.9   LYMPHSABS 2.0 2.4 3.4   EOSABS 0.1 0.4 0.4   MONOSABS 0.7 0.7 0.8   BASOSABS 0.0 0.0 0.0   ABSIMMGRAN 0.0 0.0 0.0      LFT No results for input(s): BILITOT, BILIDIR, ALKPHOS, AST, ALT, PROT, LABALBU, GLOB in the last 72 hours.    Cardiac  Lab Results   Component Value Date/Time    NTPROBNP 2,449 02/10/2023 05:03 AM    TROPHS 10.9 02/09/2023 09:37 PM      Coags Lab Results   Component Value Date/Time    PROTIME 14.0 02/09/2023 09:37 PM    PROTIME 18.5 02/09/2023 09:37 PM    INR 1.2 02/09/2023 09:37 PM    INR 1.5 02/09/2023 09:37 PM      A1c Lab Results   Component Value Date/Time    LABA1C 5.9 02/11/2023 05:47 AM     02/11/2023 05:47 AM      Lipids Lab Results   Component Value Date/Time    CHOL 136 02/11/2023 05:47 AM    LDLCALC 56.2 02/11/2023 05:47 AM    LABVLDL 11.8 02/11/2023 05:47 AM    HDL 68 02/11/2023 05:47 AM    CHOLHDLRATIO 2.0 02/11/2023 05:47 AM    TRIG 59 02/11/2023 05:47 AM      Thyroid  Lab Results   Component Value Date/Time    JDN3AQY 1.640 12/02/2022 09:17 AM        Most Recent UA Lab Results   Component Value Date/Time    COLORU YELLOW/STRAW 02/10/2023 12:45 AM    APPEARANCE CLEAR 02/10/2023 12:45 AM    SPECGRAV 1.012 02/10/2023 12:45 AM    LABPH 8.0 02/10/2023 12:45 AM    PROTEINU 30 02/10/2023 12:45 AM    GLUCOSEU Negative 02/10/2023 12:45 AM    KETUA Negative 02/10/2023 12:45 AM    BILIRUBINUR Negative 02/10/2023 12:45 AM    BLOODU TRACE 02/10/2023 12:45 AM    UROBILINOGEN 0.2 02/10/2023 12:45 AM    NITRU Negative 02/10/2023 12:45 AM    LEUKOCYTESUR Negative 02/10/2023 12:45 AM    WBCUA 0-4 02/10/2023 12:45 AM    RBCUA 0-5 02/10/2023 12:45 AM    EPITHUA 0-5 02/10/2023 12:45 AM    BACTERIA Negative 02/10/2023 12:45 AM    LABCAST 0-2 02/10/2023 12:45 AM    MUCUS 0 02/10/2023 12:45 AM        No results for input(s): CULTURE in the last 720 hours.     All Labs from Last 24 Hrs:  Recent Results (from the past 24 hour(s))   CBC with Auto Differential    Collection Time: 02/16/23  5:47 AM   Result Value Ref Range    WBC 10.5 4.3 - 11.1 K/uL    RBC 4.08 4.05 - 5.2 M/uL    Hemoglobin 12.7 11.7 - 15.4 g/dL    Hematocrit 37.5 35.8 - 46.3 %    MCV 91.9 82 - 102 FL    MCH 31.1 26.1 - 32.9 PG    MCHC 33.9 31.4 - 35.0 g/dL    RDW 14.4 11.9 - 14.6 %    Platelets 791 543 - 419 K/uL    MPV 9.3 (L) 9.4 - 12.3 FL    nRBC 0.00 0.0 - 0.2 K/uL    Differential Type AUTOMATED      Seg Neutrophils 56 43 - 78 %    Lymphocytes 32 13 - 44 %    Monocytes 8 4.0 - 12.0 %    Eosinophils % 4 0.5 - 7.8 %    Basophils 0 0.0 - 2.0 %    Immature Granulocytes 0 0.0 - 5.0 %    Segs Absolute 5.9 1.7 - 8.2 K/UL    Absolute Lymph # 3.4 0.5 - 4.6 K/UL    Absolute Mono # 0.8 0.1 - 1.3 K/UL    Absolute Eos # 0.4 0.0 - 0.8 K/UL    Basophils Absolute 0.0 0.0 - 0.2 K/UL    Absolute Immature Granulocyte 0.0 0.0 - 0.5 K/UL   Basic Metabolic Panel w/ Reflex to MG Collection Time: 02/16/23  5:47 AM   Result Value Ref Range    Sodium 127 (L) 133 - 143 mmol/L    Potassium 4.5 3.5 - 5.1 mmol/L    Chloride 98 (L) 101 - 110 mmol/L    CO2 24 21 - 32 mmol/L    Anion Gap 5 2 - 11 mmol/L    Glucose 94 65 - 100 mg/dL    BUN 11 8 - 23 MG/DL    Creatinine 0.60 0.6 - 1.0 MG/DL    Est, Glom Filt Rate >60 >60 ml/min/1.73m2    Calcium 9.2 8.3 - 10.4 MG/DL   Phosphorus    Collection Time: 02/16/23  5:47 AM   Result Value Ref Range    Phosphorus 2.1 (L) 2.3 - 3.7 MG/DL       No Known Allergies  Immunization History   Administered Date(s) Administered    COVID-19, MODERNA BLUE border, Primary or Immunocompromised, (age 12y+), IM, 100 mcg/0.5mL 03/09/2021, 04/09/2021, 11/09/2021    Influenza Virus Vaccine 10/01/2015    Influenza, Gerardo Comp (age 72 y+), High Dose, 0.7mL 11/09/2021    Influenza, High Dose (Fluzone 65 yrs and older) 08/24/2016, 09/30/2017, 11/16/2018, 10/10/2019, 09/23/2020    PPD Test 02/10/2023    Pneumococcal Conjugate 13-valent ([id number]) 08/24/2016    Pneumococcal Polysaccharide ([id number]) 01/01/2007, 07/15/2015    Td vaccine (adult) 01/01/2012    Zoster Live (Zostavax) 12/09/2013       Recent Vital Data:  Patient Vitals for the past 24 hrs:   Temp Pulse Resp BP SpO2   02/16/23 1409 -- -- -- (!) 141/64 --   02/16/23 1315 -- -- -- (!) 125/49 --   02/16/23 0750 -- -- -- (!) 173/74 --   02/16/23 0748 97.3 °F (36.3 °C) 72 18 (!) 182/80 97 %   02/16/23 0500 97.9 °F (36.6 °C) 56 18 (!) 138/57 96 %   02/16/23 0105 98.2 °F (36.8 °C) 69 18 (!) 148/61 96 %   02/15/23 2006 97.8 °F (36.6 °C) 64 18 (!) 146/55 97 %       Oxygen Therapy  SpO2: 97 %  Pulse Oximeter Device Mode: Intermittent  O2 Device: None (Room air)  O2 Flow Rate (L/min): 2 L/min    Estimated body mass index is 27.4 kg/m² as calculated from the following:    Height as of this encounter: 5' 1\" (1.549 m). Weight as of this encounter: 145 lb (65.8 kg).     Intake/Output Summary (Last 24 hours) at 2/16/2023 1526  Last data filed at 2/16/2023 1200  Gross per 24 hour   Intake 598 ml   Output 1000 ml   Net -402 ml         Physical Exam:    General:    Well nourished. No overt distress  Head:  Normocephalic, atraumatic  Eyes:  Sclerae appear normal.  Pupils equally round. HENT:  Nares appear normal, no drainage. Moist mucous membranes  Neck:  No restricted ROM. Trachea midline  CV:   RRR. No m/r/g. No JVD  Lungs:   CTAB. No wheezing, rhonchi, or rales. Respirations even, unlabored  Abdomen:   Soft, nontender, nondistended. Extremities: Warm and dry. No cyanosis or clubbing. No edema. Skin:     No rashes. Normal coloration  Neuro:  CN II-XII grossly intact. Psych:  Normal mood and affect. Signed:  Elkin Roblero MD    Part of this note may have been written by using a voice dictation software. The note has been proof read but may still contain some grammatical/other typographical errors.

## 2023-02-16 NOTE — PROGRESS NOTES
Admit Date: 2/9/2023  81 y.o. female with medical history of AFIB, CVA, carotid stenosis, HTN  She presented with aphasia, confusion, SOB with hypoxia    CT scan of head neg for acute changes   Chest xray: lung congestion   Renal requested for hyponatremia     Subjective:          Review of Systems  Cardio-vascular: no chest pain, no SOB  GI: no N/V/D  : no dysuria, no hematuria    Objective:     Patient Vitals for the past 8 hrs:   BP Temp Temp src Pulse Resp SpO2   02/16/23 0750 (!) 173/74 -- -- -- -- --   02/16/23 0748 (!) 182/80 97.3 °F (36.3 °C) Oral 72 18 97 %   02/16/23 0500 (!) 138/57 97.9 °F (36.6 °C) Oral 56 18 96 %       02/16 0701 - 02/16 1900  In: -   Out: 300 [Urine:300]      Physical Exam:   Lungs: clear  CV: RR, no rub   Abdomen: soft, not tender, no rebound.   Ext: no edema          Data Review   Recent Results (from the past 8 hour(s))   CBC with Auto Differential    Collection Time: 02/16/23  5:47 AM   Result Value Ref Range    WBC 10.5 4.3 - 11.1 K/uL    RBC 4.08 4.05 - 5.2 M/uL    Hemoglobin 12.7 11.7 - 15.4 g/dL    Hematocrit 37.5 35.8 - 46.3 %    MCV 91.9 82 - 102 FL    MCH 31.1 26.1 - 32.9 PG    MCHC 33.9 31.4 - 35.0 g/dL    RDW 14.4 11.9 - 14.6 %    Platelets 823 072 - 303 K/uL    MPV 9.3 (L) 9.4 - 12.3 FL    nRBC 0.00 0.0 - 0.2 K/uL    Differential Type AUTOMATED      Seg Neutrophils 56 43 - 78 %    Lymphocytes 32 13 - 44 %    Monocytes 8 4.0 - 12.0 %    Eosinophils % 4 0.5 - 7.8 %    Basophils 0 0.0 - 2.0 %    Immature Granulocytes 0 0.0 - 5.0 %    Segs Absolute 5.9 1.7 - 8.2 K/UL    Absolute Lymph # 3.4 0.5 - 4.6 K/UL    Absolute Mono # 0.8 0.1 - 1.3 K/UL    Absolute Eos # 0.4 0.0 - 0.8 K/UL    Basophils Absolute 0.0 0.0 - 0.2 K/UL    Absolute Immature Granulocyte 0.0 0.0 - 0.5 K/UL   Basic Metabolic Panel w/ Reflex to MG    Collection Time: 02/16/23  5:47 AM   Result Value Ref Range    Sodium 127 (L) 133 - 143 mmol/L    Potassium 4.5 3.5 - 5.1 mmol/L    Chloride 98 (L) 101 - 110 mmol/L    CO2 24 21 - 32 mmol/L    Anion Gap 5 2 - 11 mmol/L    Glucose 94 65 - 100 mg/dL    BUN 11 8 - 23 MG/DL    Creatinine 0.60 0.6 - 1.0 MG/DL    Est, Glom Filt Rate >60 >60 ml/min/1.73m2    Calcium 9.2 8.3 - 10.4 MG/DL   Phosphorus    Collection Time: 02/16/23  5:47 AM   Result Value Ref Range    Phosphorus 2.1 (L) 2.3 - 3.7 MG/DL           Assessment:     Principal Problem:    Stroke-like symptoms  Active Problems:    Hyponatremia    Hypokalemia    Acute respiratory failure with hypoxia (HCC)    Acute metabolic encephalopathy    Cerebrovascular accident (CVA) ([address]    Primary insomnia    Long term current use of anticoagulant    Generalized anxiety disorder    Gastroesophageal reflux disease without esophagitis    Pulmonary hypertension (HCC)    SSS (sick sinus syndrome) (HCC)    Permanent atrial fibrillation (HCC)    Mitral valve regurgitation    History of CVA (cerebrovascular accident)    Bilateral carotid artery disease ([address]    Pure hypercholesterolemia    Essential tremor  Resolved Problems:    * No resolved hospital problems. *      Plan:     -Chronic hypo osmolar hyponatremia  Volume expanded. Hyponatremia could be exacerbated with Aldactone and Celexa, hypokalemia. On Urea   Na is a little down   Stop Aldactone         -CVA     - A.  Fib     -HTN     Filiberto Perkins MD

## 2023-02-16 NOTE — PROGRESS NOTES
Physical Therapy Note:    Attempted to see patient this PM for physical therapy treatment  session. Patient declined therapy this pm and stating that she is discharging home today. Will follow and re-attempt as schedule permits/patient available.  Thank you,    Tova Baptiste [facility]     Rehab Caseload Tracker

## 2023-02-17 ENCOUNTER — CARE COORDINATION (OUTPATIENT)
Dept: CARE COORDINATION | Facility: CLINIC | Age: 83
End: 2023-02-17

## 2023-02-17 NOTE — CARE COORDINATION
CM contacted by Fannie Robertson with [facility] to inform CM that family is requesting DME and home health. Patient was discharged 2/16 at 1600. CM reviewed clinical record to find CM repeatedly met with patient's daughter in patient's room when patient was off the floor and informed of therapy's recommendation for [facility], additional skilled therapy. CM informed patient's family [place] [address] with family available and supportive. CM offered dianna to placed order for DME and supportive care for family. CM awaiting home health preference. DME order placed. Family will  at [facility].

## 2023-02-17 NOTE — CARE COORDINATION
[facility] Care Transitions Initial Follow Up Call    Call within 2 business days of discharge: Yes    Patient Current Location:  Home: [address] [address]    LPN Care Coordinator contacted the family by telephone to perform post hospital discharge assessment. Verified name and  with family as identifiers. Provided introduction to self, and explanation of the LPN Care Coordinator role. Patient: Frank Chapman Patient : 1940   MRN: [id number]  Reason for Admission: Stroke-like symptoms  Discharge Date: 23 RARS: Readmission Risk Score: 13.8      Last Discharge [address]       Date Complaint Diagnosis Description Type Department Provider    23 Cerebrovascular Accident Cerebrovascular accident (CVA), unspecified mechanism [facility]) ED to Hosp-Admission (Discharged) (ADMITTED) Paula Mcgrath MD; AVEL Salazar. Was this an external facility discharge? No Discharge Facility: [facility]    Challenges to be reviewed by the provider   Additional needs identified to be addressed with provider: No  none               Method of communication with provider: none. Mr Jeny Mondragon stated they thought [place] and dme: BSC would be ordered and those were not mentioned at discharge. Reached out to  CM at Mr Gresham's request; dme: [facility]SC, RW will be ordered and available to be picked up, [place] order can be placed at patient's request.  Returned call to Mr Jeny Mondragon, unable to leave message on home number, left detailed message on mobile- dme will be available to  at their convenience, advised to call 3rd floor nursing station prior so that staff may be able to meet them at the door; also informed regarding [place] not initially ordered due to declined service with strong family support, requested Mr Jeny Mondragon return call to let me know what [place] provider they would like to use and order will be initiated on their behalf.     LPN Care Coordinator reviewed medical action plan with family who verbalized understanding. The family was given an opportunity to ask questions and does not have any further questions or concerns at this time. Were discharge instructions available to patient? Yes. Reviewed appropriate site of care based on symptoms and resources available to patient including: PCP. The family agrees to contact the PCP office for questions related to their healthcare. Advance Care Planning:   Does patient have an Advance Directive:  decision maker verified . Review of discharge medication was performed with family, who verbalizes understanding of administration of home medications. Medications reviewed, 1111F entered: N/A    Was patient discharged with a pulse oximeter? no    Non-face-to-face services provided:  Obtained and reviewed discharge summary and/or continuity of care documents    Offered patient enrollment in the Remote Patient Monitoring (RPM) program for in-home monitoring: NA.    Care Transitions 24 Hour Call    Do you have a copy of your discharge instructions?: Yes  Do you have all of your prescriptions and are they filled?: Yes  Have you been contacted by a [id number] Pharmacist?: No  Have you scheduled your follow up appointment?: No (Comment: Mr Ramy Ni states their daughter is handling scheduling of appointments)  Do you have support at home?: Partner/Spouse/SO  Do you feel like you have everything you need to keep you well at home?: No  Care Transitions Interventions     DME Assistance: Completed            Discussed follow-up appointments. If no appointment was previously scheduled, appointment scheduling offered: Yes. Is follow up appointment scheduled within 7 days of discharge? Mr Ramy Ni states their daughter is handling scheduling of appointments.     Follow Up  Future Appointments   Date Time Provider Carolina Zamarripa   2/23/2023  8:30 AM Jailene Kang MD [facility]   3/10/2023  1:00 PM Zhou Tapia MD [facility]   4/14/2023  3:15 PM Francie Mcmillan Tr [facility]       LPN Care Coordinator provided contact information. Plan for follow up call in 3 to 5 days based on severity of symptoms and risk factors.   Plan for next call: follow-up appointment-assess scheduling/attendance; request for [facility] services- provider verified, order initiated; dme picked up    Vi Wilde, LPN

## 2023-02-20 ENCOUNTER — CARE COORDINATION (OUTPATIENT)
Dept: CARE COORDINATION | Facility: CLINIC | Age: 83
End: 2023-02-20

## 2023-02-20 NOTE — CARE COORDINATION
Care Transitions Outreach Attempt      Attempted outreach follow up [facility] and if DME was picked up, without success. Unable to leave message. No new needs are noted on chart review. Will attempt next outreach in 7days. Patient: Jc Euceda Patient : 1940 MRN: [id number]    Last Discharge [address]       Date Complaint Diagnosis Description Type Department Provider    23 Cerebrovascular Accident Cerebrovascular accident (CVA), unspecified mechanism [facility]) ED to Hosp-Admission (Discharged) (ADMITTED) Carmen Curtis MD; AVEL Bettencourt.               Noted following upcoming appointments from discharge chart review:   [facility] Isha Bautista follow up appointment(s):   Future Appointments   Date Time Provider Carolina Zamarripa   2023  8:30 AM Sheryl Ty MD [facility]   3/10/2023  1:00 PM Haroldo Cuadra MD [facility]   2023  3:15 PM Vanessa Raman DO [facility]     Non-BS follow up appointment(s): Carron Oppenheim, NP ([address]) no known follow up

## 2023-02-27 ENCOUNTER — OFFICE VISIT (OUTPATIENT)
Dept: VASCULAR SURGERY | Age: 83
End: 2023-02-27

## 2023-02-27 VITALS
WEIGHT: 145 LBS | DIASTOLIC BLOOD PRESSURE: 81 MMHG | HEART RATE: 69 BPM | OXYGEN SATURATION: 90 % | BODY MASS INDEX: 27.38 KG/M2 | SYSTOLIC BLOOD PRESSURE: 132 MMHG | HEIGHT: 61 IN

## 2023-02-27 DIAGNOSIS — I65.23 BILATERAL CAROTID ARTERY STENOSIS: Primary | ICD-10-CM

## 2023-02-27 DIAGNOSIS — G20 PARKINSON'S DISEASE (HCC): ICD-10-CM

## 2023-02-27 PROCEDURE — 99024 POSTOP FOLLOW-UP VISIT: CPT | Performed by: STUDENT IN AN ORGANIZED HEALTH CARE EDUCATION/TRAINING PROGRAM

## 2023-03-01 ENCOUNTER — CARE COORDINATION (OUTPATIENT)
Dept: CARE COORDINATION | Facility: CLINIC | Age: 83
End: 2023-03-01

## 2023-03-01 NOTE — CARE COORDINATION
[facility] Care Transitions Follow Up Call    Patient Current Location:  Home: [address] [place] [id number]    N Care Coordinator contacted the family by telephone to follow up after admission on 23. Verified name and  with family as identifiers. Patient: Kathie Sagastume  Patient : 1940   MRN: [id number]  Reason for Admission: Stroke-like symptoms  Discharge Date: 23 RARS: Readmission Risk Score: 13.8      Needs to be reviewed by the provider   Additional needs identified to be addressed with provider: No  none             Method of communication with provider: none. Mr. Kramer Bachelor states patient is progressing well, pcp- Yanira Khanna NP made home visit yesterday. Denies new needs or concerns. Addressed changes since last contact:  none  Discussed follow-up appointments. If no appointment was previously scheduled, appointment scheduling offered: Yes. Is follow up appointment scheduled within 7 days of discharge? Yes. Follow Up  Future Appointments   Date Time Provider Carolina Zamarripa   3/3/2023  1:00 PM KACY Gill [facility]   3/10/2023  1:00 PM Nancy Knott MD [facility]   2023  3:15 PM Oksana Stuart DO [facility]     Non-[facility] follow up appointment(s): ravi    N Care Coordinator reviewed medical action plan with family and discussed any barriers to care and/or understanding of plan of care after discharge. Discussed appropriate site of care based on symptoms and resources available to patient including: PCP  Specialist  Provider home visits. The family agrees to contact the PCP office for questions related to their healthcare. Advance Care Planning:   Decision maker verified .      Patients top risk factors for readmission:  comorbidities  Interventions to address risk factors:  c ontinued compliance with plan of care    Offered patient enrollment in the Remote Patient Monitoring (RPM) program for in-home monitoring: NA.     Care Transitions Subsequent and Final Call    Schedule Follow Up Appointment with PCP: Completed  Subsequent and Final Calls  Do you have any ongoing symptoms?: No  Have your medications changed?: No  Do you have any questions related to your medications?: No  Do you currently have any active services?: No  Do you have any needs or concerns that I can assist you with?: No  Identified Barriers: None  Care Transitions Interventions  No Identified Needs     DME Assistance: Completed   Other Interventions:             LPN Care Coordinator provided contact information for future needs. No further follow-up call indicated based on severity of symptoms and risk factors.   Plan for next call:  graduated from [facility], LPN

## 2023-03-02 ENCOUNTER — PATIENT MESSAGE (OUTPATIENT)
Dept: NEUROLOGY | Age: 83
End: 2023-03-02

## 2023-03-02 ENCOUNTER — TELEPHONE (OUTPATIENT)
Dept: CARDIOLOGY CLINIC | Age: 83
End: 2023-03-02

## 2023-03-02 RX ORDER — TRAZODONE HYDROCHLORIDE 50 MG/1
TABLET ORAL
COMMUNITY
End: 2023-03-03 | Stop reason: ALTCHOICE

## 2023-03-02 NOTE — PROGRESS NOTES
[facility]  [address] [facility]  [address] [facility], [address] [facility]      Chief Complaint   Patient presents with    Follow-Up from Hospital     stroke         Stan Alvarez is a 81 y.o. female who presents is here today for hospital follow up for stroke, ? PD. PMH significant for AFIB on xarelto, SSS, prior CVA s/p tpa with hemorrhagic converstion in 2015, bilateral carotid stenosis who presented from home via EMS with acute onset aphasia . On arrival,  CODE stroke called. She was Evaluated by tele-neurology, NIH 3. CT of head negative for acute intracranial abnormality. CTA head/neck demonstrated severe stenosis 90% in PAULETTE and 70% on left; incidental finding of 2 mm aneurysm or infundibulum directed inferiorly from the left ICA terminus, unchanged from 2018. MRI of brain showed acute infarct in the right anterior paramedian frontal lobe, moderated small vessel disease. She was evaluated by vascular surgery with recommendations for DAPT and follow up as outpatient. TTE EF 60-65% mild LAD, negative for PFO. She was evaluated by PT/OT/ST with recommendations for IPR, however patient declined. She was discharged home with [facility] on ASA 81 mg daily, Xarelto 20 mg daily , and atorvastatin 40 mg daily for secondary stroke prevention. Examination suggestive for PD, recommend OP workup. Interval history:    She is here today with her spouse. She is currently residing at home and participating in [employer]. Oriented to person, place, situation. Remains hypophonic, bradykinetic. Ambulating with rolling walker. Spouse stated since her hospitalization, she has fallen twice while attempting to get up. She denies hitting her head or LOC. Orthostatic blood pressures positive during visit with SBP drop 42pts. She is currently participating in [employer]. Her spouse manages her medications. She is currently taking ASA 81 mg daily and atorvastatin 40 mg daily for secondary stroke prevention.  Denies GI distress, bleeding complications or myalgias. Denies feelings of depression, sadness or SI. She denies tobacco use, ETOH use or recreational drug use. Denies snoring, REM sleep behaviors, visual or auditory hallucinations, changes in swallowing or speech, bowel or bladder incontinence. Stated she utilizes a bedside commode during the evening hours. Hx of pAF- she is currently on xarelto. Denies bleeding complications. Tremor noted in right hand greater than left. She was started on carbidopa-levodopa  mg 1/2 tablet three times daily. Spouse stated tremor has improved, however she continues to have difficulty with fine motor movements. Tolerating medication without side effects.       Past Medical History:   Diagnosis Date    Carotid stenosis     With infarct    Cerebral vascular accident ([facility] [address] 07/2015    CVA (cerebrovascular accident) ([address]     Depression     GERD (gastroesophageal reflux disease)     Hypercholesterolemia     Hypertension     Mitral valve regurgitation     Paroxysmal atrial fibrillation ([address]     Pulmonary hypertension (HCC)     SSS (sick sinus syndrome) ([address]     Tachycardia-bradycardia    Stroke ([facility] [address] 2015       Past Surgical History:   Procedure Laterality Date    COLONOSCOPY  2011    GYN      sharonda--years ago    ORTHOPEDIC SURGERY  2013    left total knee       Family History   Problem Relation Age of Onset    Stroke Mother     Heart Disease Father        Social History     Socioeconomic History    Marital status:    Tobacco Use    Smoking status: Never    Smokeless tobacco: Never   Vaping Use    Vaping Use: Never used   Substance and Sexual Activity    Alcohol use: No     Alcohol/week: 0.0 standard drinks    Drug use: No    Sexual activity: Not Currently         Current Outpatient Medications:     carbidopa-levodopa (SINEMET)  MG per tablet, Take 0.5 tablets by mouth 3 times daily, Disp: 90 tablet, Rfl: 0    urea (URE-NA) 15 g PACK packet, Take 15 g by mouth daily (Patient not taking: Reported on 2/27/2023), Disp: 30 each, Rfl: 0    atorvastatin (LIPITOR) 40 MG tablet, Take 2 tablets by mouth daily, Disp: 30 tablet, Rfl: 3    omeprazole (PRILOSEC) 20 MG delayed release capsule, Take 1 capsule by mouth daily, Disp: 30 capsule, Rfl: 5    amLODIPine (NORVASC) 5 MG tablet, Take 5 mg by mouth daily, Disp: , Rfl:     ascorbic acid (VITAMIN C) 250 MG tablet, Take by mouth, Disp: , Rfl:     aspirin 81 MG EC tablet, Take by mouth daily, Disp: , Rfl:     lisinopril (PRINIVIL;ZESTRIL) 20 MG tablet, Take 20 mg by mouth 2 times daily, Disp: , Rfl:     rivaroxaban (XARELTO) 20 MG TABS tablet, Take 20 mg by mouth Daily with supper, Disp: , Rfl:     No Known Allergies    Review of Systems   Constitutional: Negative. HENT: Negative. Eyes: Negative. Respiratory: Negative. Cardiovascular:  Positive for palpitations. Gastrointestinal: Negative. Endocrine: Negative. Genitourinary: Negative. Musculoskeletal:  Positive for arthralgias and gait problem. Skin: Negative. Allergic/Immunologic: Negative. Neurological:  Positive for dizziness, tremors and weakness. Hematological: Negative. Psychiatric/Behavioral:  Positive for confusion. Negative for dysphoric mood, hallucinations, sleep disturbance and suicidal ideas. The patient is not nervous/anxious. Physical Examination  /69 (Position: Standing)   Pulse 77   Ht 5' 2\" (1.575 m)   Wt 148 lb 12.8 oz (67.5 kg)   SpO2 95%   BMI 27.22 kg/m²     General - Well developed, well nourished, in no apparent distress. Pleasant and conversent. hypomimia. HEENT - Normocephalic, atraumatic. Conjunctiva, tympanic membranes, and oropharynx are clear. Neck - Supple without masses, no bruits   Cardiovascular - irregular rate and rhythm. Normal S1, S2 without murmurs, rubs, or gallops. Lungs - Clear to auscultation. Abdomen - Soft, nontender with normal bowel sounds.    Extremities - Peripheral pulses intact. No edema and no rashes. Motor examination -: Normal muscle bulk. Muscle tone is slightly increased with bilateral cogwheel rigidity in the upper extremities. Plantar response is flexor bilaterally. Sensation is intact to light touch, pinprick, vibration and proprioception in all extremities. Cerebellar examination is normal.  A kinetic tremor is present. In addition, the patient has a rest tremor which oscillates in supination/pronation on the right. Gait and stance stooped posture, shuffling gait, ambulating with rolling walker. Most recent CTA  - I personally reviewed this image   CT Result (most recent):  CTA HEAD NECK W CONTRAST 02/09/2023    Narrative  EXAMINATION: CT HEAD WO CONTRAST, CTA HEAD NECK W CONTRAST    DATE: 2/9/2023 9:30 PM    INDICATION: Weakness, aphasia, unknown last normal    COMPARISON: CTA neck from 1/8/2018. TECHNIQUE: Thin section noncontrast axial images were obtained through the head. CT angiography through the head and neck was performed in the axial plane using  100 mL of Omnipaque 350 administered intravenously, without adverse reaction. Coronal and sagittal MIP and MPR images were generated. CT dose lowering  techniques were used, to include: automated exposure control, adjustment for  patient size, and or use of iterative reconstruction. FINDINGS:    CT Head: Intracranial contents:    No intracranial hemorrhage, evidence of acute territorial infarct, mass, mass  effect or hydrocephalus. Hypodensity in the right lentiform nuclei and corona  radiata most likely represents a chronic lacunar infarct. Extensive chronic  small vessel ischemic changes noted throughout the white matter. Extensive  intracranial atherosclerosis. Moderate global cerebral volume loss. Bones and extracranial soft tissues:    No fracture or focal osseous lesion in the calvarium or skull base. Bilateral  cataract surgery, otherwise orbits are unremarkable.  Mucosal thickening in the  bilateral maxillary and sphenoid sinuses. Mastoid air cells and middle ear  cavities are clear bilaterally. CTA of the neck:    Aorta  Bovine configuration of the arch, normal variant. Moderate atherosclerotic  stenosis at the origins of the bilateral subclavian arteries is also made of  moderate stenosis in the left axillary artery. Right Carotid  Extensive atherosclerotic plaque in the distal right common carotid artery,  carotid bifurcation and ICA bulb with severe stenosis of the distal common  carotid artery and bifurcation and critical stenosis of the proximal ICA, 90% by  NASCET criteria. External carotid is also severely narrowed. Left Carotid  The common carotid artery is widely patent. Extensive calcified plaque in the  ICA origin and ICA bulb with approximately 70% stenosis by NASCET criteria. Mild  atherosclerosis stenosis of the proximal external carotid artery. Vertebral arteries  The vertebral arteries are codominant. No evidence of occlusion, stenosis, or  dissection. Osseous and soft tissue structures:    No soft tissue abnormality visible in the neck. Trace interstitial edema in the  lung apices. No acute osseous lesions in the neck. Degenerative changes in the  cervical spine. CTA of the head:    Anterior circulation  Atherosclerotic plaque throughout the bilateral carotid siphons without  hemodynamically significant stenosis. The middle and anterior cerebral arteries  are widely patent and unremarkable bilaterally. Incidental note of a 2 mm  broad-based aneurysm or infundibulum directed inferiorly from the left ICA  terminus on image 378 of series 301 and image 70 of series 602. Anterior  cerebral arteries are widely patent. There is no A1 segment on the right side,  both anterior cerebral arteries originate from the left ICA, normal variant.     Posterior circulation  The vertebrobasilar system, superior cerebellar arteries, and posterior cerebral  arteries are normal in caliber, without occlusion, significant stenosis, or  aneurysmal dilation. Dural venous sinuses are patent where visualized. No abnormal arterial  enhancement in the brain. Impression  1. No acute intracranial abnormality visible by CT. MRI has greater sensitivity  for acute infarct. 2. Extensive chronic small vessel ischemic changes throughout the white matter  and possible chronic lacunar infarct in the right basal ganglia/internal  capsule. 3. No intracranial arterial occlusion or hemodynamically significant stenosis. 4. 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 5. Severe/critical atherosclerotic stenosis of the bilateral internal carotid  arteries in the neck, measuring 90% on the right and 70% on the left by NASCET  criteria. 6. Vertebral arteries are widely patent bilaterally. 7. Moderate atherosclerotic stenosis at the origins of both subclavian arteries. 8. Trace interstitial edema in the lung apices. 9. Mild inflammation in the paranasal sinuses. Discussed with Dr. Donell Erazo at 16 p.m. on 2/9/2023        This examination was interpreted by Jacquelyn Watkins M.D. Jacquelyn Watkins M.D.  2/9/2023 10:20:00 PM      Most recent MRI - I personally reviewed this image   MRI Result (most recent):  MRI BRAIN WO CONTRAST 02/14/2023    Narrative  EXAMINATION: MRI BRAIN WO CONTRAST 2/14/2023 10:28 AM    ACCESSION NUMBER: [id number]    COMPARISON: CTA head and CT head 2/9/2023. INDICATION: 67-year-old female with strokelike symptoms, Parkinson's disease  with underlying cognitive impairment. TECHNIQUE: Multiplanar multisequence MRI of the brain without the administration  of intravenous contrast.    FINDINGS:    There are small linear and punctate foci of elevated DWI signal and slightly low  signal on ADC map within the right anterior paramedian frontal lobe and adjacent  subcortical white matter (5:17, 18).  Mild diffuse parenchymal atrophy with  symmetric expansion of the CSF containing structures. There is a moderate degree  of patchy and confluent foci of T2/FLAIR hyperintensity in the periventricular  and deep white matter, most pronounced surrounding the left atrium and occipital  horn of the left lateral ventricle, nonspecific but most commonly seen with  chronic small vessel ischemic changes. No evidence of acute intracranial  hemorrhage. No discrete cerebral or cerebellar mass. The ventricles are within  normal limits in size and configuration. Prominent choroid plexus  xanthogranulomas with restricted diffusion on the left. No extra-axial fluid  collections. Bilateral lens replacement. Mild mucosal thickening of the right  inferior maxillary sinus. Impression  Small foci of acute/subacute infarct in the right anterior paramedian frontal  lobe. Moderate chronic white matter microvascular ischemic changes. 02/09/23    TRANSTHORACIC ECHOCARDIOGRAM (TTE) COMPLETE (CONTRAST/BUBBLE/3D PRN) 02/10/2023  3:50 PM, 02/10/2023 12:00 AM (Final)    Interpretation Summary    Left Ventricle: Normal left ventricular systolic function with a visually estimated EF of 60 - 65%. Left ventricle size is normal. Normal wall thickness. Normal wall motion. Normal diastolic function. Aortic Valve: Mild sclerosis of the aortic valve cusp. Mitral Valve: Mildly thickened leaflet, at the anterior leaflet. Moderate regurgitation. Tricuspid Valve: Mild to moderate regurgitation. Moderately elevated RVSP. The estimated RVSP is 48 mmHg. Left Atrium: Left atrium is mildly dilated. LA Vol Index is  35 ml/m2. Interatrial Septum: Agitated saline study was negative with and without provocation. Right Atrium: Right atrium is mildly dilated. Technical qualifiers: Color flow Doppler was performed and pulse wave and/or continuous wave Doppler was performed.     Signed by: Rachele Felder MD on 2/10/2023  3:50 PM, Signed by: Kendell Don Provider Result on 2/10/2023 12:00 AM  Lab Results   Component Value Date    CHOL 136 02/11/2023    CHOL 166 12/02/2022    CHOL 165 12/02/2021     Lab Results   Component Value Date    TRIG 59 02/11/2023    TRIG 123 12/02/2022    TRIG 133 12/02/2021     Lab Results   Component Value Date    HDL 68 (H) 02/11/2023    HDL 61 (H) 12/02/2022    HDL 51 12/02/2021     Lab Results   Component Value Date    LDLCALC 56.2 02/11/2023    LDLCALC 80.4 12/02/2022    LDLCALC 91 12/02/2021     Lab Results   Component Value Date    LABVLDL 11.8 02/11/2023    LABVLDL 24.6 (H) 12/02/2022    LABVLDL 23 06/12/2020    VLDL 23 12/02/2021    VLDL 17 06/07/2021    VLDL 23 12/01/2020     Lab Results   Component Value Date    CHOLHDLRATIO 2.0 02/11/2023    CHOLHDLRATIO 2.7 12/02/2022     Hemoglobin A1C   Date Value Ref Range Status   02/11/2023 5.9 (H) 4.8 - 5.6 % Final         Wellington Angelucci was seen today for follow-up from hospital.    Diagnoses and all orders for this visit:    Hospital discharge follow-up  -     [place] DISCHARGE MEDS RECONCILED W/ CURRENT OUTPATIENT MED LIST    History of stroke  MRI of brain showed acute infarct in left frontal lobe, embolic etiology. Continue secondary stroke prevention ASA, Xarelto and atorvastatin  Maintain -160 avoid hypotension and drastic fluctuations   Goal LDL<70  Goal A1C <7.0  Discussed Mediterranean diet and increasing cardiovascular exercise to goal of 30 minutes daily. Depression screening completed. Reviewed BE FAST and when to call 911. Longstanding persistent atrial fibrillation ([facility] [address]  Followed by cardiology. Continue Xarelto. Bilateral carotid artery stenosis  Patient is at high risk for neurological complications in the setting of carotid artery surgical intervention. At this time, I favor medical management. Followed by Vascular surgery.    Long term current use of anticoagulant    Parkinsonism, unspecified parkinsonism type  Increase carbidopa-levodopa to  mg three times daily.    Will have patient follow up with movement specialist at [facility]. Continue HH therapies. Discussed fall risk and prevention. Continue to ambulate with assistive devices. Orthostatic hypotension   Orthostatic blood pressures positive with 42 pt drop from sitting to standing. Start Mestinon 30 mg three times daily. Side effects discussed with patient and spouse. Discussed drinking 20 oz of ice cold water prior to getting out of bed in the morning to help sustain BP. Discussed slow positional changes. Keep head of bed 30 degrees. Avoid lying in supine position. Avoid hot environments, ie showers or outdoors, to reduce risk of syncope. Recommend abdominal binder or compression stockings while up. Discussed fall risk and prevention. History of recent fall   Continue therapies. Discussed fall risk and prevention    Follow up in 3 months or sooner if needed. I spent greater than 50% of the 60 total minutes of today's visit in coordination of care and patient/family education and counseling regarding the above patient concerns, reviewing the patient's medical record, my assessment and recommendations.           Jena Juarez, [address] [id number] [facility] [address] [facility] [place]  11 [facility], [address] [place] [address]  GXWFN:[phone]

## 2023-03-03 ENCOUNTER — OFFICE VISIT (OUTPATIENT)
Dept: NEUROLOGY | Age: 83
End: 2023-03-03

## 2023-03-03 VITALS
HEIGHT: 62 IN | HEART RATE: 77 BPM | BODY MASS INDEX: 27.38 KG/M2 | SYSTOLIC BLOOD PRESSURE: 107 MMHG | WEIGHT: 148.8 LBS | DIASTOLIC BLOOD PRESSURE: 69 MMHG | OXYGEN SATURATION: 95 %

## 2023-03-03 DIAGNOSIS — I95.1 ORTHOSTATIC HYPOTENSION: ICD-10-CM

## 2023-03-03 DIAGNOSIS — Z09 HOSPITAL DISCHARGE FOLLOW-UP: Primary | ICD-10-CM

## 2023-03-03 DIAGNOSIS — I65.23 BILATERAL CAROTID ARTERY STENOSIS: ICD-10-CM

## 2023-03-03 DIAGNOSIS — I48.11 LONGSTANDING PERSISTENT ATRIAL FIBRILLATION (HCC): ICD-10-CM

## 2023-03-03 DIAGNOSIS — G20 PARKINSONISM, UNSPECIFIED PARKINSONISM TYPE (HCC): ICD-10-CM

## 2023-03-03 DIAGNOSIS — Z91.81 HISTORY OF RECENT FALL: ICD-10-CM

## 2023-03-03 DIAGNOSIS — Z79.01 LONG TERM CURRENT USE OF ANTICOAGULANT: ICD-10-CM

## 2023-03-03 DIAGNOSIS — Z86.73 HISTORY OF STROKE: ICD-10-CM

## 2023-03-03 RX ORDER — LORAZEPAM 1 MG/1
1 TABLET ORAL NIGHTLY
COMMUNITY

## 2023-03-03 RX ORDER — PYRIDOSTIGMINE BROMIDE 60 MG/1
30 TABLET ORAL 3 TIMES DAILY
Qty: 60 TABLET | Refills: 3 | Status: SHIPPED | OUTPATIENT
Start: 2023-03-03

## 2023-03-03 ASSESSMENT — PATIENT HEALTH QUESTIONNAIRE - PHQ9
SUM OF ALL RESPONSES TO PHQ QUESTIONS 1-9: 0
SUM OF ALL RESPONSES TO PHQ QUESTIONS 1-9: 0
2. FEELING DOWN, DEPRESSED OR HOPELESS: 0
1. LITTLE INTEREST OR PLEASURE IN DOING THINGS: 0
SUM OF ALL RESPONSES TO PHQ QUESTIONS 1-9: 0
SUM OF ALL RESPONSES TO PHQ9 QUESTIONS 1 & 2: 0
SUM OF ALL RESPONSES TO PHQ QUESTIONS 1-9: 0

## 2023-03-03 ASSESSMENT — ENCOUNTER SYMPTOMS
GASTROINTESTINAL NEGATIVE: 1
RESPIRATORY NEGATIVE: 1
ALLERGIC/IMMUNOLOGIC NEGATIVE: 1
EYES NEGATIVE: 1

## 2023-03-08 RX ORDER — LISINOPRIL 20 MG/1
TABLET ORAL
Qty: 180 TABLET | Refills: 3 | Status: SHIPPED | OUTPATIENT
Start: 2023-03-08

## 2023-03-16 ENCOUNTER — TELEPHONE (OUTPATIENT)
Dept: NEUROLOGY | Age: 83
End: 2023-03-16

## 2023-03-16 NOTE — TELEPHONE ENCOUNTER
Pt will be seeing DEVON Hull in June 2023 but will have patient follow up with movement specialist at [facility].

## 2023-03-23 NOTE — TELEPHONE ENCOUNTER
Left message to schedule a NTP appointment with Dr. Jose Rivera in September, transfer from [facility].

## 2023-03-28 ENCOUNTER — TELEPHONE (OUTPATIENT)
Dept: CARDIOLOGY CLINIC | Age: 83
End: 2023-03-28

## 2023-03-28 RX ORDER — HYDRALAZINE HYDROCHLORIDE 50 MG/1
50 TABLET, FILM COATED ORAL 2 TIMES DAILY
Qty: 60 TABLET | Refills: 11 | Status: SHIPPED | OUTPATIENT
Start: 2023-03-28

## 2023-03-28 NOTE — TELEPHONE ENCOUNTER
Sees  2/14. Had stroke in Feb.and was diagnosed w/parkinsons. Since being home BP has been high. BP running 157/80,188/76,avg 174/85. HR running in the 51-70's range. Lisnopril 20mg bid,Norvasc 5mg qam,Spironolactone 25mg qday and is on Xarelto 20mg qday.

## 2023-03-28 NOTE — TELEPHONE ENCOUNTER
In hospital in February with a stroke. Now her BP is high and needs to know what to do.  Please call

## 2023-03-28 NOTE — TELEPHONE ENCOUNTER
I called and informed pt.  of MD response. I escribed med as below. Pt.already has a fu appt. in April.   Requested Prescriptions     Signed Prescriptions Disp Refills    hydrALAZINE (APRESOLINE) 50 MG tablet 60 tablet 11     Sig: Take 1 tablet by mouth in the morning and at bedtime     Authorizing Provider: Connie Campbell     Ordering User: LONI BRUNSON

## 2023-04-04 PROBLEM — G20 PARKINSON'S DISEASE (HCC): Status: ACTIVE | Noted: 2023-04-04

## 2023-04-04 NOTE — PROGRESS NOTES
[address]   [address] [facility] FAX: [phone]     Felicita Wong  : 1940        Subjective: Patient denies any changes in symptoms since discharge she is ambulating with rolling walker. Positive for orthostatic hypotension. Currently compliant with ASA, Xarelto and statin. Reports working with home health. Discussion with patient and family regarding risks and benefits of surgery. Would recommend against intervention at this time and patient is agreeable. Plan: Long discussion with family regarding the risks benefits and alternatives to surgery. Recommend best medical management. Smoker:  Tobacco Use      Smoking status: Never      Smokeless tobacco: Never     Complexity of illness:  HLD, Afib, and CVA     Procedures by BSVS: None     Referred by: No ref. provider found    KACY Sow NP      Statin: Yes          DAPT/AC: ASA, Plavix, and Xarelto     Dye allergy: no     Metal implants or AICD: no       Constitutional:   Negative for fevers and unexplained weight loss. Eyes:   Negative for visual disturbance. ENT:   Negative for significant hearing loss and tinnitus. Respiratory:   Negative for hemoptysis. Cardiovascular:   Negative except as noted in HPI. Gastrointestinal:   Negative for melena and abdominal pain. Genitourinary:   Negative for hematuria, renal stones. Integumentary:   Negative for rash or non-healing wounds  Hematologic/Lymphatic:   Negative for excessive bleeding hx or clotting disorder. Musculoskeletal:  Negative for active, unexplained/severe joint pain. Behavioral/Psych:   Negative for suicidal ideations. Endocrine:   Negative for uncontrolled diabetic symptoms including polyuria, polydipsia and poor wound healing. Physical Examination:   BP: (!) 142/67, Pain 0-10: Pain Level: 4, Location: Lower back; General: no distress.    HENT: AT  CV: irregularly irregular, normal rate  LUNG: Effort

## 2023-04-14 ENCOUNTER — TELEPHONE (OUTPATIENT)
Dept: CARDIOLOGY CLINIC | Age: 83
End: 2023-04-14

## 2023-04-17 NOTE — TELEPHONE ENCOUNTER
I spoke w/pt.  at [address] [place] [address] N request.He reports BP is better. She is doing well on Eliquis. /65 yesterday and Saturday was 113/63,137/62. This am /94 hr=74.

## 2023-04-28 ENCOUNTER — OFFICE VISIT (OUTPATIENT)
Dept: CARDIOLOGY CLINIC | Age: 83
End: 2023-04-28
Payer: MEDICARE

## 2023-04-28 VITALS
DIASTOLIC BLOOD PRESSURE: 66 MMHG | HEART RATE: 60 BPM | WEIGHT: 148 LBS | HEIGHT: 62 IN | SYSTOLIC BLOOD PRESSURE: 136 MMHG | BODY MASS INDEX: 27.23 KG/M2

## 2023-04-28 DIAGNOSIS — I48.11 LONGSTANDING PERSISTENT ATRIAL FIBRILLATION (HCC): ICD-10-CM

## 2023-04-28 DIAGNOSIS — I49.5 SSS (SICK SINUS SYNDROME) (HCC): ICD-10-CM

## 2023-04-28 DIAGNOSIS — I10 PRIMARY HYPERTENSION: ICD-10-CM

## 2023-04-28 DIAGNOSIS — I27.20 PULMONARY HYPERTENSION (HCC): Primary | ICD-10-CM

## 2023-04-28 LAB
ERYTHROCYTE [DISTWIDTH] IN BLOOD BY AUTOMATED COUNT: 14.6 % (ref 11.9–14.6)
HCT VFR BLD AUTO: 34.3 % (ref 35.8–46.3)
HGB BLD-MCNC: 11.1 G/DL (ref 11.7–15.4)
MCH RBC QN AUTO: 32.2 PG (ref 26.1–32.9)
MCHC RBC AUTO-ENTMCNC: 32.4 G/DL (ref 31.4–35)
MCV RBC AUTO: 99.4 FL (ref 82–102)
NRBC # BLD: 0 K/UL (ref 0–0.2)
PLATELET # BLD AUTO: 284 K/UL (ref 150–450)
PMV BLD AUTO: 9.8 FL (ref 9.4–12.3)
RBC # BLD AUTO: 3.45 M/UL (ref 4.05–5.2)
WBC # BLD AUTO: 6.4 K/UL (ref 4.3–11.1)

## 2023-04-28 PROCEDURE — 3078F DIAST BP <80 MM HG: CPT | Performed by: INTERNAL MEDICINE

## 2023-04-28 PROCEDURE — 99214 OFFICE O/P EST MOD 30 MIN: CPT | Performed by: INTERNAL MEDICINE

## 2023-04-28 PROCEDURE — 3075F SYST BP GE 130 - 139MM HG: CPT | Performed by: INTERNAL MEDICINE

## 2023-04-28 PROCEDURE — G8428 CUR MEDS NOT DOCUMENT: HCPCS | Performed by: INTERNAL MEDICINE

## 2023-04-28 PROCEDURE — 1123F ACP DISCUSS/DSCN MKR DOCD: CPT | Performed by: INTERNAL MEDICINE

## 2023-04-28 PROCEDURE — 1090F PRES/ABSN URINE INCON ASSESS: CPT | Performed by: INTERNAL MEDICINE

## 2023-04-28 PROCEDURE — 1036F TOBACCO NON-USER: CPT | Performed by: INTERNAL MEDICINE

## 2023-04-28 PROCEDURE — G8417 CALC BMI ABV UP PARAM F/U: HCPCS | Performed by: INTERNAL MEDICINE

## 2023-04-28 PROCEDURE — G8400 PT W/DXA NO RESULTS DOC: HCPCS | Performed by: INTERNAL MEDICINE

## 2023-04-28 RX ORDER — FUROSEMIDE 40 MG/1
40 TABLET ORAL DAILY
Qty: 30 TABLET | Refills: 11 | Status: SHIPPED | OUTPATIENT
Start: 2023-04-28

## 2023-04-28 NOTE — PROGRESS NOTES
Results   Component Value Date    TRIG 59 02/11/2023    TRIG 123 12/02/2022    TRIG 133 12/02/2021     Lab Results   Component Value Date    HDL 68 (H) 02/11/2023    HDL 61 (H) 12/02/2022    HDL 51 12/02/2021     Lab Results   Component Value Date    LDLCALC 56.2 02/11/2023    LDLCALC 80.4 12/02/2022    LDLCALC 91 12/02/2021     Lab Results   Component Value Date    LABVLDL 11.8 02/11/2023    LABVLDL 24.6 (H) 12/02/2022    LABVLDL 23 06/12/2020    VLDL 23 12/02/2021    VLDL 17 06/07/2021    VLDL 23 12/01/2020     Lab Results   Component Value Date    CHOLHDLRATIO 2.0 02/11/2023    CHOLHDLRATIO 2.7 12/02/2022           I have Independently reviewed prior care notes, any ER records available, cardiac testing, labs and results with the patient and before seeing the patient today. Also independently reviewed outside records when available. ASSESSMENT:    Geeta Mantilla was seen today for coronary artery disease. Diagnoses and all orders for this visit:    Pulmonary hypertension ([facility] [address]    SSS (sick sinus syndrome) (HCC)    Longstanding persistent atrial fibrillation ([facility] [address]    Primary hypertension    Other orders  -     furosemide (LASIX) 40 MG tablet; Take 1 tablet by mouth daily          PLAN:     CVA:  CVA while on xarelto daily. Changed to eliquis 5 BID today        2. HTN:  Stopped norvasc, Ace BID. Increased Hyd to 50 TID. [facility] AVOID salt intake. Add Hyd 50 qhs as needed for high AM readings. Low Na,  LABS today. Take lasix PRN. Instructed patient to follow low sodium diet. Monitor BP at home, will review at follow up. Aim for at least 30 minutes moderate physical activity at least 5 days a week. If needed, work on stress reduction and lifestyle modification. 3. PAF/SSS:   CVA on xarelto, change to eliquis 5 BID, back to EP as needed. I have reviewed their anticoagulation treatment, instructed patient to call with any bleeding issues such as melana or other.   The patient

## 2023-04-29 LAB
ALBUMIN SERPL-MCNC: 3.7 G/DL (ref 3.2–4.6)
ALBUMIN/GLOB SERPL: 1.4 (ref 0.4–1.6)
ALP SERPL-CCNC: 92 U/L (ref 50–136)
ALT SERPL-CCNC: 12 U/L (ref 12–65)
ANION GAP SERPL CALC-SCNC: 4 MMOL/L (ref 2–11)
AST SERPL-CCNC: 13 U/L (ref 15–37)
BILIRUB SERPL-MCNC: 0.7 MG/DL (ref 0.2–1.1)
BUN SERPL-MCNC: 6 MG/DL (ref 8–23)
CALCIUM SERPL-MCNC: 9.1 MG/DL (ref 8.3–10.4)
CHLORIDE SERPL-SCNC: 102 MMOL/L (ref 101–110)
CO2 SERPL-SCNC: 23 MMOL/L (ref 21–32)
CREAT SERPL-MCNC: 0.8 MG/DL (ref 0.6–1)
GLOBULIN SER CALC-MCNC: 2.7 G/DL (ref 2.8–4.5)
GLUCOSE SERPL-MCNC: 112 MG/DL (ref 65–100)
MAGNESIUM SERPL-MCNC: 1.4 MG/DL (ref 1.8–2.4)
POTASSIUM SERPL-SCNC: 4.3 MMOL/L (ref 3.5–5.1)
PROT SERPL-MCNC: 6.4 G/DL (ref 6.3–8.2)
SODIUM SERPL-SCNC: 129 MMOL/L (ref 133–143)

## 2023-05-03 ENCOUNTER — TELEPHONE (OUTPATIENT)
Dept: CARDIOLOGY CLINIC | Age: 83
End: 2023-05-03

## 2023-05-10 NOTE — TELEPHONE ENCOUNTER
I called and explained 's response. She voiced understanding. She stated she does not need a Rx for Mag. She has Mag 400mg at home and she will start taking the medication as instructed BID. She will follow up with PCP.

## 2023-07-05 ENCOUNTER — OFFICE VISIT (OUTPATIENT)
Age: 83
End: 2023-07-05
Payer: MEDICARE

## 2023-07-05 VITALS
SYSTOLIC BLOOD PRESSURE: 130 MMHG | BODY MASS INDEX: 25.41 KG/M2 | DIASTOLIC BLOOD PRESSURE: 60 MMHG | HEIGHT: 62 IN | HEART RATE: 48 BPM | WEIGHT: 138.1 LBS

## 2023-07-05 DIAGNOSIS — I10 PRIMARY HYPERTENSION: ICD-10-CM

## 2023-07-05 DIAGNOSIS — Z86.73 HISTORY OF CVA (CEREBROVASCULAR ACCIDENT): ICD-10-CM

## 2023-07-05 DIAGNOSIS — I48.11 LONGSTANDING PERSISTENT ATRIAL FIBRILLATION (HCC): ICD-10-CM

## 2023-07-05 DIAGNOSIS — Z79.01 LONG TERM CURRENT USE OF ANTICOAGULANT: ICD-10-CM

## 2023-07-05 DIAGNOSIS — E78.00 PURE HYPERCHOLESTEROLEMIA: ICD-10-CM

## 2023-07-05 DIAGNOSIS — I65.23 BILATERAL CAROTID ARTERY STENOSIS: ICD-10-CM

## 2023-07-05 DIAGNOSIS — I49.5 SSS (SICK SINUS SYNDROME) (HCC): ICD-10-CM

## 2023-07-05 DIAGNOSIS — I48.21 PERMANENT ATRIAL FIBRILLATION (HCC): ICD-10-CM

## 2023-07-05 DIAGNOSIS — I27.20 PULMONARY HYPERTENSION (HCC): Primary | ICD-10-CM

## 2023-07-05 PROCEDURE — G8428 CUR MEDS NOT DOCUMENT: HCPCS | Performed by: INTERNAL MEDICINE

## 2023-07-05 PROCEDURE — 3078F DIAST BP <80 MM HG: CPT | Performed by: INTERNAL MEDICINE

## 2023-07-05 PROCEDURE — 1036F TOBACCO NON-USER: CPT | Performed by: INTERNAL MEDICINE

## 2023-07-05 PROCEDURE — 1123F ACP DISCUSS/DSCN MKR DOCD: CPT | Performed by: INTERNAL MEDICINE

## 2023-07-05 PROCEDURE — G8417 CALC BMI ABV UP PARAM F/U: HCPCS | Performed by: INTERNAL MEDICINE

## 2023-07-05 PROCEDURE — 1090F PRES/ABSN URINE INCON ASSESS: CPT | Performed by: INTERNAL MEDICINE

## 2023-07-05 PROCEDURE — 99214 OFFICE O/P EST MOD 30 MIN: CPT | Performed by: INTERNAL MEDICINE

## 2023-07-05 PROCEDURE — G8400 PT W/DXA NO RESULTS DOC: HCPCS | Performed by: INTERNAL MEDICINE

## 2023-07-05 PROCEDURE — 3075F SYST BP GE 130 - 139MM HG: CPT | Performed by: INTERNAL MEDICINE

## 2023-07-05 RX ORDER — ONDANSETRON 4 MG/1
4 TABLET, ORALLY DISINTEGRATING ORAL 3 TIMES DAILY PRN
Qty: 21 TABLET | Refills: 5 | Status: SHIPPED | OUTPATIENT
Start: 2023-07-05

## 2023-07-05 NOTE — PROGRESS NOTES
[id number] [facility], [facility], [address]  PHONE: [phone]     23    NAME:  Debra Rivera  : 1940  MRN: [id number]       SUBJECTIVE:   Debra Rivera is a 81 y.o. 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 Some falls. Edema better, lasix PRN now. Parkinson's worsening to some degree. Patient denies recent history of orthopnea, PND, excessive dizziness and/or syncope. Some ACUNA, some GI sx now. Doing well on anticoagulation treatment as reviewed today, no bleeding issues or excessive bruising noted. pauses on BB with CVA       Spouse with Dr. Aaliyah Garcia. Past Medical History, Past Surgical History, Family history, Social History, and Medications were all reviewed with the patient today and updated as necessary.      Current Outpatient Medications   Medication Sig Dispense Refill    pyridostigmine (MESTINON) 60 MG tablet Take 0.5 tablets by mouth 3 times daily 45 tablet 11    carbidopa-levodopa (SINEMET)  MG per tablet Take 1 tablet by mouth 4 times daily 120 tablet 11    Magnesium Oxide (MAG- PO) Take 400 mg by mouth in the morning and at bedtime      furosemide (LASIX) 40 MG tablet Take 1 tablet by mouth daily 30 tablet 11    apixaban (ELIQUIS) 5 MG TABS tablet Take 1 tablet by mouth 2 times daily 180 tablet 3    spironolactone (ALDACTONE) 25 MG tablet Take 1 tablet by mouth daily      lisinopril (PRINIVIL;ZESTRIL) 20 MG tablet Take 1 tablet by mouth 2 times daily 180 tablet 3    hydrALAZINE (APRESOLINE) 50 MG tablet Take 1 tablet by mouth 3 times daily 90 tablet 3    LORazepam (ATIVAN) 1 MG tablet Take 1 tablet by mouth at